# Patient Record
Sex: FEMALE | Race: WHITE | Employment: OTHER | URBAN - METROPOLITAN AREA
[De-identification: names, ages, dates, MRNs, and addresses within clinical notes are randomized per-mention and may not be internally consistent; named-entity substitution may affect disease eponyms.]

---

## 2017-02-14 ENCOUNTER — MYC MEDICAL ADVICE (OUTPATIENT)
Dept: INTERNAL MEDICINE | Facility: CLINIC | Age: 67
End: 2017-02-14

## 2017-02-17 ENCOUNTER — RADIANT APPOINTMENT (OUTPATIENT)
Dept: GENERAL RADIOLOGY | Facility: CLINIC | Age: 67
End: 2017-02-17
Attending: PODIATRIST
Payer: COMMERCIAL

## 2017-02-17 ENCOUNTER — OFFICE VISIT (OUTPATIENT)
Dept: PODIATRY | Facility: CLINIC | Age: 67
End: 2017-02-17
Payer: COMMERCIAL

## 2017-02-17 VITALS
SYSTOLIC BLOOD PRESSURE: 136 MMHG | HEIGHT: 68 IN | DIASTOLIC BLOOD PRESSURE: 80 MMHG | WEIGHT: 181 LBS | BODY MASS INDEX: 27.43 KG/M2

## 2017-02-17 DIAGNOSIS — M79.672 LEFT FOOT PAIN: Primary | ICD-10-CM

## 2017-02-17 DIAGNOSIS — M84.375A STRESS FRACTURE OF LEFT FOOT, INITIAL ENCOUNTER: ICD-10-CM

## 2017-02-17 DIAGNOSIS — M20.5X2 HALLUX LIMITUS, LEFT: ICD-10-CM

## 2017-02-17 PROCEDURE — 99203 OFFICE O/P NEW LOW 30 MIN: CPT | Performed by: PODIATRIST

## 2017-02-17 PROCEDURE — 73630 X-RAY EXAM OF FOOT: CPT | Mod: LT

## 2017-02-17 NOTE — PROGRESS NOTES
ASSESSMENT/PLAN:    Encounter Diagnoses   Name Primary?     Left foot pain Yes     Stress fracture of left foot, initial encounter      Hallux limitus, left      We discussed how her hallux limitus and foot structure likely cause lateral weight transfer and stress on the 4th met.   We reviewed x-rays together.     Relative rest  Ice  CAM walker x 2-4 weeks  Over the counter inserts  Follow up in 1 month via MyChart (if better) or clinic    Body mass index is 27.93 kg/(m^2).    Weight management plan: Patient was referred to their PCP to discuss a diet and exercise plan.      Bridger Huston, LIA, FACFAS, MS    [place] [facility] & Ankle [facility]      ____________________________________________________________________    HPI:          Chief Complaint: left foot pain  Onset of problem: 3-4 weeks  Pain/ discomfort is described as:  Deep ache  Ratin/10   Frequency:  daily    The pain is made worse with walking, movement.  She is limping  Previous treatment: rest, elevation, ibuprofen.      She denies any injury. No change in activity.  The only activity she associates to the pain is being up on a ladder for 10 min around the time the pain was first noted.  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     Diplopia      Fibromyalgia 10/12/2014     Hyperlipidemia LDL goal <130 10/31/2010     Major depression      Menopause      rare hot flash     Myalgias      Unspecified essential hypertension    *  *  Past Surgical History   Procedure Laterality Date     C nonspecific procedure        x2     C nonspecific procedure       Left femur bone growth     C nonspecific procedure       S/P T&A     C nonspecific procedure  1980s     Breast Biopsy left   *  *  Current Outpatient Prescriptions   Medication Sig Dispense Refill     amLODIPine (NORVASC) 5 MG tablet Take 1  "tablet (5 mg) by mouth daily 90 tablet 3     lisinopril-hydrochlorothiazide (PRINZIDE,ZESTORETIC) 10-12.5 MG per tablet Take 1 tablet by mouth every morning 90 tablet 3     DULoxetine (CYMBALTA) 30 MG capsule Three capsules daily in  capsule 3       ROS:     A 10-point review of systems was performed and is positive for that noted in the HPI and as seen below.  All other areas are negative.     Numbness in feet?  no   Calf pain with walking? no  Recent foot/ankle injury? no  Weight change over past 12 months? 20# loss  Self perception as overweight? yes  Recent flu-like symptoms? no  Joint pain other than feet ? Occasional knee, hip, shoulder, hands    Social History: Employment:  Manage and clean building;  Exercise/Physical activity:  Not currently;  Tobacco use:  no  Social History     Social History     Marital status:      Spouse name: N/A     Number of children: N/A     Years of education: N/A     Occupational History     Not on file.     Social History Main Topics     Smoking status: Never Smoker     Smokeless tobacco: Not on file     Alcohol use Yes      Comment: once in a while, small amount     Drug use: No     Sexual activity: No      Comment: tubal ligation     Other Topics Concern     Not on file     Social History Narrative       Family history:  Family History   Problem Relation Age of Onset     Hypertension Father      C.A.D. Father      CANCER Father      bladder  at age 88     Respiratory Mother      COPD     CEREBROVASCULAR DISEASE Brother        Rheumatoid arthritis:  no  Foot Problems: parent, grandparent - bunions  Diabetes: no      EXAM:    Vitals: /80  Ht 5' 7.5\" (1.715 m)  Wt 181 lb (82.1 kg)  LMP 2009  BMI 27.93 kg/m2  BMI: Body mass index is 27.93 kg/(m^2).  Height: 5' 7.5\"    Constitutional/ general:  Pt is in no apparent distress, appears well-nourished.  Cooperative with history and physical exam.     Vascular:  Pedal pulses are palpable bilaterally for both " the DP and PT arteries.  CFT < 3 sec.  No edema.  Pedal hair growth noted.     Neuro:  Alert and oriented x 3. Coordinated gait.  Light touch sensation is intact to the L4, L5, S1 distributions. No obvious deficits.  No evidence of neurological-based weakness, spasticity, or contracture in the lower extremities.     Derm: Normal texture and turgor.  No erythema, ecchymosis, or cyanosis.  No open lesions.     Musculoskeletal:    Lower extremity muscle strength is normal.  Patient is ambulatory without an assistive device or brace.  No gross deformities.  Pain on palpation: over the shaft of the left 4th metatarsal and 3rd.  4th is more painful.     Limited left hallux dorsiflexion with loading of the forefoot.  Dorsal eminence, first met head.      Radiographic Exam:  X-Ray Findings:  I personally reviewed the films.  I do not appreciate any changes in the 3rd or 4th met to suggest a case stress fracture.  Degenerative changes of the 1st metatarsophalangeal joint:  Narrowing, sclerosis, spurring.  1st met elevatus.       Bridger Huston DPM, FACFAS, MS    [place] [facility] of Podiatry/Foot & Ankle Surgery

## 2017-02-17 NOTE — MR AVS SNAPSHOT
"              After Visit Summary   2/17/2017    Yandy Zamudio    MRN: [id number]           Patient Information     Date Of Birth          1950        Visit Information        Provider Department      2/17/2017 7:00 AM Bridger Huston DPM [facility]        Today's Diagnoses     Left foot pain    -  1      Care Instructions    DEGENERATIVE ARTHRITIS OF THE BIG TOE JOINT   (hallux limitus/hallux rigidus)   Arthritis of the joint at the base of the big toe (metatarsophalangeal joint) has several causes. Usually it results from repetitive trauma to the joint, secondary to abnormal foot mechanics. Often it is hereditary. However, a one-time traumatic event can lead to arthritis. The condition doesn't improve with time, and even with treatment, can worsen. The cartilage wears out, joint surfaces are no longer smooth, bone rubs on bone, inflammation occurs with pain, and eventually bone spurs and loose fragments might develop.   The joint is often painful with activity, worse with flimsy shoes or walking barefoot, and it slowly progresses over time. A person might notice the toe \"locking up\" with walking. There often is an obvious, and irritating, bony bump on top of the foot. Shoes might be uncomfortable. In some people the pain is so bothersome that recreational activities sometimes even normal daily activities are difficult to perform.   The pain from this arthritis is likely a combination of joint jamming, cartilage loss and inflammation, and irritation from shoes rubbing on the bump. Sometimes other parts of the foot, leg, or back hurt from altering one's walk to compensate for the painful jOint.     Ways to help a person live with the discomfort include wearing a good, supportive shoe with a rigid, rocker-type bottom. An example is a hiking boot. A rigid sole minimizes bending of the joint, and therefore, joint motion and pain. Shoes with a high toe box allow for less rubbing on " the bump. Avoiding barefoot walking, sandals, flip-flops and slippers usually helps.   Sometimes an insert or orthotic provides symptom relief. This might make shoe fit more difficult. Pads over the bump and occasionally injections into the joint provide relief.   Surgery for this condition is aimed towards alleviating pain. It does not cure the arthritis nor does it guarantee better joint motion. Depending on the condition of the metatarsophalangeal joint, there are several surgiqal options:    1.  Cutting off the bony bump(s) and cleaning the joint    2.  Loosening the joint up by making cuts in the first metatarsal bone or the big toe bone and removing a small section of bone.    3.  Repositioning bone to minimize jamming of the joint.    4.  In severe cases, the joint is fused. By fusing the joint, it will never bend again. This resolves the pain, because it's the movement of a worn out jOint that causes pain. Oftentimes the operation involves a combination of these procedures and. requires the use of screws, pins, and/or a small surgical plate.     Healing after surgery requires about six weeks of protection. This allows the bone to heal. Maximum recovery takes about one year. The scar tissue and joint structures require this amount of time to finish the healing process. Expect stiffness, swelling and numbness during that time frame.   Surgery for arthritis of the metatarsophalangeal joint does involve side effects. Some side effects are predictable and others are less common but do occur. A scar will be visible and could be irritated by shoes. The shoe may rub on the screw or internal pin requiring surgical removal of these fixation devices. The screw and pin would likely be left in place for a full year. The first toe may remain stiff after surgery. The amount of stiffness is variable. Most people never regain normal motion of the first toe. This is due to scar tissue inherent to any surgery, in addition to  the cumUlative effects of arthritis. Sometimes the big toe drifts to one side or the other. Joint fusion is one option to correct an unstable, drifting toe. This procedure straightens the toe, however, no motion remains.   All surgical procedures involve risk of infection, numbness, pain, delayed bone healing, osteotomy (bone cut) dislocation, blood clots, continued foot pain, etc. Arthritic joint surgery is quite complex and should not be taken lightly.    Any skin incision can lead to infection. Deep infection might involve the bone and thus repeat surgery and six weeks of IV antibiotics. Scar tissue can cause nerve pain or numbness. his is generally temporary but can be permanent. We do not have treatments that cure nerve problems. Second toe pain could be related to altered mechanics and pressure transferred to the second toe. Delayed bone healing would lengthen the healing time. Some bones simply do not heal. This requires repeat surgery, electronic bone stimulation and/or extended protection. Smokers have an approximate 20% chance of poor bone healing. This is double that of a non-smoker. The bone cut may displace. This may need to be repaired with a second operation. Displacement can cause joint malalignment. Immobility after surgery can cause a blood clot in the legs and lungs. This could result in death.   Foot pain is complex. Most feet hurt for more than one reason. Operating on the arthritic   big toe joint will not necessarily create a pain free foot. Appropriate shoes, healthy body weight, avoidance of bare foot walking and moderation of activity will always be   necessary to enjoy foot comfort. Arthritis is incurable even with surgery.     Surgery for this type of arthritis is nevertheless quite successful. Most surgical patients are pleased with their foot following surgery. Many of the issues described above can be controlled by taking proper care of your foot during the healing process.   Cosmetic  "bump surgery is discouraged for the reasons listed above. A bump and joint that is comfortable when wearing appropriate shoes should simply be treated with appropriate shoes.   Your surgeon would be happy to fully describe any of the above issues. You should pursue a full understanding of the operation, recovery process and any potential problems that could develop.           Follow-ups after your visit        Who to contact     If you have questions or need follow up information about today's clinic visit or your schedule please contact [facility] directly at [phone].  Normal or non-critical lab and imaging results will be communicated to you by [employer], letter or phone within 4 business days after the clinic has received the results. If you do not hear from us within 7 days, please contact the clinic through [employer] or phone. If you have a critical or abnormal lab result, we will notify you by phone as soon as possible.  Submit refill requests through [employer] or call your pharmacy and they will forward the refill request to us. Please allow 3 business days for your refill to be completed.          Additional Information About Your Visit        [employer] Information     [employer] gives you secure access to your electronic health record. If you see a primary care provider, you can also send messages to your care team and make appointments. If you have questions, please call your primary care clinic.  If you do not have a primary care provider, please call [phone] and they will assist you.        Care EveryWhere ID     This is your Care EveryWhere ID. This could be used by other organizations to access your [place] medical records  [id number]        Your Vitals Were     Height Last Period BMI (Body Mass Index)             5' 7.5\" (1.715 m) 07/27/2009 27.93 kg/m2          Blood Pressure from Last 3 Encounters:   02/17/17 136/80   11/11/16 138/72   11/17/15 120/70    Weight from Last 3 " Encounters:   02/17/17 181 lb (82.1 kg)   11/11/16 181 lb (82.1 kg)   11/17/15 195 lb (88.5 kg)              We Performed the Following     XR Foot Left G/E 3 Views        Primary Care Provider Office Phone # Fax #    Omid Rivera [phone]       [facility] [address] [facility] [address]        Thank you!     Thank you for choosing [facility]  for your care. Our goal is always to provide you with excellent care. Hearing back from our patients is one way we can continue to improve our services. Please take a few minutes to complete the written survey that you may receive in the mail after your visit with us. Thank you!             Your Updated Medication List - Protect others around you: Learn how to safely use, store and throw away your medicines at www.disposemymeds.org.          This list is accurate as of: 2/17/17  7:23 AM.  Always use your most recent med list.                   Brand Name Dispense Instructions for use    amLODIPine 5 MG tablet    NORVASC    90 tablet    Take 1 tablet (5 mg) by mouth daily       DULoxetine 30 MG EC capsule    CYMBALTA    270 capsule    Three capsules daily in AM       lisinopril-hydrochlorothiazide 10-12.5 MG per tablet    PRINZIDE/ZESTORETIC    90 tablet    Take 1 tablet by mouth every morning

## 2017-02-17 NOTE — NURSING NOTE
"Chief Complaint   Patient presents with     Foot Problems     x3-4 week left foot pain on the top of the foot        Initial /80  Ht 5' 7.5\" (1.715 m)  Wt 181 lb (82.1 kg)  LMP 07/27/2009  BMI 27.93 kg/m2 Estimated body mass index is 27.93 kg/(m^2) as calculated from the following:    Height as of this encounter: 5' 7.5\" (1.715 m).    Weight as of this encounter: 181 lb (82.1 kg).  Medication Reconciliation: complete   Dana Sunshine MA      "

## 2017-02-17 NOTE — PATIENT INSTRUCTIONS
"DEGENERATIVE ARTHRITIS OF THE BIG TOE JOINT   (hallux limitus/hallux rigidus)   Arthritis of the joint at the base of the big toe (metatarsophalangeal joint) has several causes. Usually it results from repetitive trauma to the joint, secondary to abnormal foot mechanics. Often it is hereditary. However, a one-time traumatic event can lead to arthritis. The condition doesn't improve with time, and even with treatment, can worsen. The cartilage wears out, joint surfaces are no longer smooth, bone rubs on bone, inflammation occurs with pain, and eventually bone spurs and loose fragments might develop.   The joint is often painful with activity, worse with flimsy shoes or walking barefoot, and it slowly progresses over time. A person might notice the toe \"locking up\" with walking. There often is an obvious, and irritating, bony bump on top of the foot. Shoes might be uncomfortable. In some people the pain is so bothersome that recreational activities sometimes even normal daily activities are difficult to perform.   The pain from this arthritis is likely a combination of joint jamming, cartilage loss and inflammation, and irritation from shoes rubbing on the bump. Sometimes other parts of the foot, leg, or back hurt from altering one's walk to compensate for the painful jOint.     Ways to help a person live with the discomfort include wearing a good, supportive shoe with a rigid, rocker-type bottom. An example is a hiking boot. A rigid sole minimizes bending of the joint, and therefore, joint motion and pain. Shoes with a high toe box allow for less rubbing on the bump. Avoiding barefoot walking, sandals, flip-flops and slippers usually helps.   Sometimes an insert or orthotic provides symptom relief. This might make shoe fit more difficult. Pads over the bump and occasionally injections into the joint provide relief.   Surgery for this condition is aimed towards alleviating pain. It does not cure the arthritis nor does " it guarantee better joint motion. Depending on the condition of the metatarsophalangeal joint, there are several surgiqal options:    1.  Cutting off the bony bump(s) and cleaning the joint    2.  Loosening the joint up by making cuts in the first metatarsal bone or the big toe bone and removing a small section of bone.    3.  Repositioning bone to minimize jamming of the joint.    4.  In severe cases, the joint is fused. By fusing the joint, it will never bend again. This resolves the pain, because it's the movement of a worn out jOint that causes pain. Oftentimes the operation involves a combination of these procedures and. requires the use of screws, pins, and/or a small surgical plate.     Healing after surgery requires about six weeks of protection. This allows the bone to heal. Maximum recovery takes about one year. The scar tissue and joint structures require this amount of time to finish the healing process. Expect stiffness, swelling and numbness during that time frame.   Surgery for arthritis of the metatarsophalangeal joint does involve side effects. Some side effects are predictable and others are less common but do occur. A scar will be visible and could be irritated by shoes. The shoe may rub on the screw or internal pin requiring surgical removal of these fixation devices. The screw and pin would likely be left in place for a full year. The first toe may remain stiff after surgery. The amount of stiffness is variable. Most people never regain normal motion of the first toe. This is due to scar tissue inherent to any surgery, in addition to the cumUlative effects of arthritis. Sometimes the big toe drifts to one side or the other. Joint fusion is one option to correct an unstable, drifting toe. This procedure straightens the toe, however, no motion remains.   All surgical procedures involve risk of infection, numbness, pain, delayed bone healing, osteotomy (bone cut) dislocation, blood clots, continued  foot pain, etc. Arthritic joint surgery is quite complex and should not be taken lightly.    Any skin incision can lead to infection. Deep infection might involve the bone and thus repeat surgery and six weeks of IV antibiotics. Scar tissue can cause nerve pain or numbness. his is generally temporary but can be permanent. We do not have treatments that cure nerve problems. Second toe pain could be related to altered mechanics and pressure transferred to the second toe. Delayed bone healing would lengthen the healing time. Some bones simply do not heal. This requires repeat surgery, electronic bone stimulation and/or extended protection. Smokers have an approximate 20% chance of poor bone healing. This is double that of a non-smoker. The bone cut may displace. This may need to be repaired with a second operation. Displacement can cause joint malalignment. Immobility after surgery can cause a blood clot in the legs and lungs. This could result in death.   Foot pain is complex. Most feet hurt for more than one reason. Operating on the arthritic   big toe joint will not necessarily create a pain free foot. Appropriate shoes, healthy body weight, avoidance of bare foot walking and moderation of activity will always be   necessary to enjoy foot comfort. Arthritis is incurable even with surgery.     Surgery for this type of arthritis is nevertheless quite successful. Most surgical patients are pleased with their foot following surgery. Many of the issues described above can be controlled by taking proper care of your foot during the healing process.   Cosmetic bump surgery is discouraged for the reasons listed above. A bump and joint that is comfortable when wearing appropriate shoes should simply be treated with appropriate shoes.   Your surgeon would be happy to fully describe any of the above issues. You should pursue a full understanding of the operation, recovery process and any potential problems that could develop.

## 2017-03-17 ENCOUNTER — RADIANT APPOINTMENT (OUTPATIENT)
Dept: MAMMOGRAPHY | Facility: CLINIC | Age: 67
End: 2017-03-17
Attending: INTERNAL MEDICINE
Payer: COMMERCIAL

## 2017-03-17 DIAGNOSIS — Z12.31 VISIT FOR SCREENING MAMMOGRAM: ICD-10-CM

## 2017-03-17 PROCEDURE — G0202 SCR MAMMO BI INCL CAD: HCPCS | Mod: TC

## 2017-03-20 ENCOUNTER — MYC MEDICAL ADVICE (OUTPATIENT)
Dept: PODIATRY | Facility: CLINIC | Age: 67
End: 2017-03-20

## 2017-04-06 ENCOUNTER — MYC MEDICAL ADVICE (OUTPATIENT)
Dept: PODIATRY | Facility: CLINIC | Age: 67
End: 2017-04-06

## 2017-04-17 ENCOUNTER — RADIANT APPOINTMENT (OUTPATIENT)
Dept: GENERAL RADIOLOGY | Facility: CLINIC | Age: 67
End: 2017-04-17
Attending: PODIATRIST
Payer: COMMERCIAL

## 2017-04-17 ENCOUNTER — OFFICE VISIT (OUTPATIENT)
Dept: PODIATRY | Facility: CLINIC | Age: 67
End: 2017-04-17
Payer: COMMERCIAL

## 2017-04-17 VITALS
DIASTOLIC BLOOD PRESSURE: 106 MMHG | WEIGHT: 181 LBS | HEIGHT: 68 IN | BODY MASS INDEX: 27.43 KG/M2 | SYSTOLIC BLOOD PRESSURE: 179 MMHG

## 2017-04-17 DIAGNOSIS — M79.672 LEFT FOOT PAIN: Primary | ICD-10-CM

## 2017-04-17 PROCEDURE — 99213 OFFICE O/P EST LOW 20 MIN: CPT | Performed by: PODIATRIST

## 2017-04-17 PROCEDURE — 73630 X-RAY EXAM OF FOOT: CPT | Mod: LT

## 2017-04-17 NOTE — MR AVS SNAPSHOT
"              After Visit Summary   4/17/2017    Yandy Zamudio    MRN: [id number]           Patient Information     Date Of Birth          1950        Visit Information        Provider Department      4/17/2017 11:15 AM Bridger Huston DPM [facility]        Today's Diagnoses     Left foot pain    -  1       Follow-ups after your visit        Future tests that were ordered for you today     Open Future Orders        Priority Expected Expires Ordered    MR Foot Left w/o & w Contrast Routine  4/17/2018 4/17/2017            Who to contact     If you have questions or need follow up information about today's clinic visit or your schedule please contact [facility] directly at [phone].  Normal or non-critical lab and imaging results will be communicated to you by [employer], letter or phone within 4 business days after the clinic has received the results. If you do not hear from us within 7 days, please contact the clinic through [employer]cordhart or phone. If you have a critical or abnormal lab result, we will notify you by phone as soon as possible.  Submit refill requests through [employer] or call your pharmacy and they will forward the refill request to us. Please allow 3 business days for your refill to be completed.          Additional Information About Your Visit        MyChart Information     [employer] gives you secure access to your electronic health record. If you see a primary care provider, you can also send messages to your care team and make appointments. If you have questions, please call your primary care clinic.  If you do not have a primary care provider, please call [phone] and they will assist you.        Care EveryWhere ID     This is your Care EveryWhere ID. This could be used by other organizations to access your [place] medical records  [id number]        Your Vitals Were     Height Last Period BMI (Body Mass Index)             5' 7.5\" (1.715 " m) 07/27/2009 27.93 kg/m2          Blood Pressure from Last 3 Encounters:   04/17/17 (!) 179/106   02/17/17 136/80   11/11/16 138/72    Weight from Last 3 Encounters:   04/17/17 181 lb (82.1 kg)   02/17/17 181 lb (82.1 kg)   11/11/16 181 lb (82.1 kg)              We Performed the Following     XR Foot Left G/E 3 Views        Primary Care Provider Office Phone # Fax #    Omid Rivera [phone]       [facility] [address] [facility] [address]        Thank you!     Thank you for choosing [facility]  for your care. Our goal is always to provide you with excellent care. Hearing back from our patients is one way we can continue to improve our services. Please take a few minutes to complete the written survey that you may receive in the mail after your visit with us. Thank you!             Your Updated Medication List - Protect others around you: Learn how to safely use, store and throw away your medicines at www.disposemymeds.org.          This list is accurate as of: 4/17/17 12:06 PM.  Always use your most recent med list.                   Brand Name Dispense Instructions for use    amLODIPine 5 MG tablet    NORVASC    90 tablet    Take 1 tablet (5 mg) by mouth daily       DULoxetine 30 MG EC capsule    CYMBALTA    270 capsule    Three capsules daily in AM       lisinopril-hydrochlorothiazide 10-12.5 MG per tablet    PRINZIDE/ZESTORETIC    90 tablet    Take 1 tablet by mouth every morning       order for DME     1 Device    Equipment being ordered: CAM walker

## 2017-04-17 NOTE — PROGRESS NOTES
Subjective:   Yandy Zamudio is a 67 year old female who presents today in follow up for left foot pain thought to be a stress reaction of the 4th metatarsal.  She wore the CAM walker with an insert x 4 weeks.  Pain is persisting.  She has pain along the 4th metatarsal and more proximal now.  She asks what the next step is.        Patient Active Problem List    Diagnosis Date Noted     Greater trochanteric bursitis of right hip 05/04/2015     Priority: Medium     Left shoulder pain 05/04/2015     Priority: Medium     Fibromyalgia 10/12/2014     Priority: Medium     Blood glucose elevated 04/27/2014     Priority: Medium     Advanced directives, counseling/discussion 01/12/2012     Priority: Medium     Advance Directive Problem List Overview:   Name Relationship Phone    Primary Health Care Agent            Alternative Health Care Agent          Discussed advance care planning with patient; information given to patient to review. 1/12/2012          HYPERLIPIDEMIA LDL GOAL <130 10/31/2010     Priority: Medium     Major depression 08/26/2010     Priority: Medium     Obesity 09/02/2009     Priority: Medium     Colon polyps      Priority: Medium     Tubulovillous adenoma       iamSPONDYLOLISTHESIS 10/15/2007     Priority: Medium     Essential hypertension 09/20/2006     Priority: Medium     Problem list name updated by automated process. Provider to review       Anxiety state 12/23/2002     Priority: Medium     Problem list name updated by automated process. Provider to review           Objective:    B/P: 179/106, T: Data Unavailable, P: Data Unavailable, R: Data Unavailable  Body mass index is 27.93 kg/(m^2).    Constitutional:  Pt is in no apparent distress,  asks appropriate questions, appears well nourished and healthy.    Vascular:  Pedal pulses are palpable bilaterally for both the DP and PT arteries. CFT < 3 sec. No edema. Pedal hair growth noted.      Neuro: Alert and oriented x 3. Coordinated gait. Light touch  sensation is intact to the L4, L5, S1 distributions. No obvious deficits. No evidence of neurological-based weakness, spasticity, or contracture in the lower extremities.      Derm: Normal texture and turgor. No erythema, ecchymosis, or cyanosis. No open lesions.      Musculoskeletal:  Lower extremity muscle strength is normal. Patient is ambulatory without an assistive device or brace. No gross deformities. Pain on palpation: over the shaft of the left 4th metatarsal.  Pain at the level of the 4th met-cuboid joint.      Limited left hallux dorsiflexion with loading of the forefoot. Dorsal eminence, first met head.      Radiographic Exam: X-Ray Findings: I personally reviewed the films. I do not appreciate any changes in the 3rd or 4th met to suggest a case stress fracture. Degenerative changes of the 1st metatarsophalangeal joint: Narrowing, sclerosis, spurring. 1st met elevatus.       Assessment/Plan:     Encounter Diagnosis   Name Primary?     Left foot pain Yes     Stress reaction of bone suspected, yet not responding to immobilization.      MRI left foot    CAM Walker until MRI    If MRI negative, then orthoses and quality shoes    If joint pain considered, image-guided injection is an option      Body mass index is 27.93 kg/(m^2).    Weight management plan: Patient was referred to their PCP to discuss a diet and exercise plan.      Bridger Huston DPM

## 2017-04-17 NOTE — LETTER
4/17/2017       RE: Yandy Zamudio  [address] [facility] [address]           Dear Colleague,    Thank you for referring your patient, Yandy Zamudio, to the [facility]. Please see a copy of my visit note below.    Subjective:   Yandy Zamudio is a 67 year old female who presents today in follow up for left foot pain thought to be a stress reaction of the 4th metatarsal.  She wore the CAM walker with an insert x 4 weeks.  Pain is persisting.  She has pain along the 4th metatarsal and more proximal now.  She asks what the next step is.        Patient Active Problem List    Diagnosis Date Noted     Greater trochanteric bursitis of right hip 05/04/2015     Priority: Medium     Left shoulder pain 05/04/2015     Priority: Medium     Fibromyalgia 10/12/2014     Priority: Medium     Blood glucose elevated 04/27/2014     Priority: Medium     Advanced directives, counseling/discussion 01/12/2012     Priority: Medium     Advance Directive Problem List Overview:   Name Relationship Phone    Primary Health Care Agent            Alternative Health Care Agent          Discussed advance care planning with patient; information given to patient to review. 1/12/2012          HYPERLIPIDEMIA LDL GOAL <130 10/31/2010     Priority: Medium     Major depression 08/26/2010     Priority: Medium     Obesity 09/02/2009     Priority: Medium     Colon polyps      Priority: Medium     Tubulovillous adenoma       iamSPONDYLOLISTHESIS 10/15/2007     Priority: Medium     Essential hypertension 09/20/2006     Priority: Medium     Problem list name updated by automated process. Provider to review       Anxiety state 12/23/2002     Priority: Medium     Problem list name updated by automated process. Provider to review           Objective:    B/P: 179/106, T: Data Unavailable, P: Data Unavailable, R: Data Unavailable  Body mass index is 27.93 kg/(m^2).    Constitutional:  Pt is in no apparent distress,  asks  appropriate questions, appears well nourished and healthy.    Vascular:  Pedal pulses are palpable bilaterally for both the DP and PT arteries. CFT < 3 sec. No edema. Pedal hair growth noted.      Neuro: Alert and oriented x 3. Coordinated gait. Light touch sensation is intact to the L4, L5, S1 distributions. No obvious deficits. No evidence of neurological-based weakness, spasticity, or contracture in the lower extremities.      Derm: Normal texture and turgor. No erythema, ecchymosis, or cyanosis. No open lesions.      Musculoskeletal:  Lower extremity muscle strength is normal. Patient is ambulatory without an assistive device or brace. No gross deformities. Pain on palpation: over the shaft of the left 4th metatarsal.  Pain at the level of the 4th met-cuboid joint.      Limited left hallux dorsiflexion with loading of the forefoot. Dorsal eminence, first met head.      Radiographic Exam: X-Ray Findings: I personally reviewed the films. I do not appreciate any changes in the 3rd or 4th met to suggest a case stress fracture. Degenerative changes of the 1st metatarsophalangeal joint: Narrowing, sclerosis, spurring. 1st met elevatus.       Assessment/Plan:     Encounter Diagnosis   Name Primary?     Left foot pain Yes     Stress reaction of bone suspected, yet not responding to immobilization.      MRI left foot    CAM Walker until MRI    If MRI negative, then orthoses and quality shoes    If joint pain considered, image-guided injection is an option      Body mass index is 27.93 kg/(m^2).    Weight management plan: Patient was referred to their PCP to discuss a diet and exercise plan.      Bridger Huston DPM            Again, thank you for allowing me to participate in the care of your patient.        Sincerely,              Bridger Huston DPM

## 2017-04-17 NOTE — NURSING NOTE
"Chief Complaint   Patient presents with     Foot Problems     previously seen in February, left foot pain has not improved with 8 weeks in CAM       Initial BP (!) 179/106 (BP Location: Right arm, Patient Position: Chair, Cuff Size: Adult Regular)  Ht 5' 7.5\" (1.715 m)  Wt 181 lb (82.1 kg)  LMP 07/27/2009  BMI 27.93 kg/m2 Estimated body mass index is 27.93 kg/(m^2) as calculated from the following:    Height as of this encounter: 5' 7.5\" (1.715 m).    Weight as of this encounter: 181 lb (82.1 kg).  Medication Reconciliation: complete   Dana Sunshine MA      "

## 2017-04-24 ENCOUNTER — HOSPITAL ENCOUNTER (OUTPATIENT)
Dept: MRI IMAGING | Facility: CLINIC | Age: 67
Discharge: HOME | End: 2017-04-24
Attending: PODIATRIST | Admitting: PODIATRIST
Payer: MEDICARE

## 2017-04-24 DIAGNOSIS — M79.672 LEFT FOOT PAIN: ICD-10-CM

## 2017-04-24 PROCEDURE — 73718 MRI LOWER EXTREMITY W/O DYE: CPT | Mod: LT

## 2017-04-25 DIAGNOSIS — M19.079 ARTHRITIS, MIDFOOT: Primary | ICD-10-CM

## 2017-04-25 DIAGNOSIS — M79.672 LEFT FOOT PAIN: ICD-10-CM

## 2017-05-09 DIAGNOSIS — Z11.59 NEED FOR HEPATITIS C SCREENING TEST: ICD-10-CM

## 2017-05-09 DIAGNOSIS — I10 ESSENTIAL HYPERTENSION WITH GOAL BLOOD PRESSURE LESS THAN 140/90: ICD-10-CM

## 2017-05-09 DIAGNOSIS — E78.5 HYPERLIPIDEMIA LDL GOAL <130: ICD-10-CM

## 2017-05-09 LAB
ANION GAP SERPL CALCULATED.3IONS-SCNC: 7 MMOL/L (ref 3–14)
BUN SERPL-MCNC: 17 MG/DL (ref 7–30)
CALCIUM SERPL-MCNC: 9.1 MG/DL (ref 8.5–10.1)
CHLORIDE SERPL-SCNC: 105 MMOL/L (ref 94–109)
CHOLEST SERPL-MCNC: 244 MG/DL
CO2 SERPL-SCNC: 30 MMOL/L (ref 20–32)
CREAT SERPL-MCNC: 0.72 MG/DL (ref 0.52–1.04)
GFR SERPL CREATININE-BSD FRML MDRD: 80 ML/MIN/1.7M2
GLUCOSE SERPL-MCNC: 110 MG/DL (ref 70–99)
HDLC SERPL-MCNC: 43 MG/DL
LDLC SERPL CALC-MCNC: 130 MG/DL
NONHDLC SERPL-MCNC: 201 MG/DL
POTASSIUM SERPL-SCNC: 4.6 MMOL/L (ref 3.4–5.3)
SODIUM SERPL-SCNC: 142 MMOL/L (ref 133–144)
TRIGL SERPL-MCNC: 353 MG/DL

## 2017-05-09 PROCEDURE — 36415 COLL VENOUS BLD VENIPUNCTURE: CPT | Performed by: INTERNAL MEDICINE

## 2017-05-09 PROCEDURE — 86803 HEPATITIS C AB TEST: CPT | Performed by: INTERNAL MEDICINE

## 2017-05-09 PROCEDURE — 80048 BASIC METABOLIC PNL TOTAL CA: CPT | Performed by: INTERNAL MEDICINE

## 2017-05-09 PROCEDURE — 80061 LIPID PANEL: CPT | Performed by: INTERNAL MEDICINE

## 2017-05-10 LAB — HCV AB SERPL QL IA: NORMAL

## 2017-05-22 ENCOUNTER — OFFICE VISIT (OUTPATIENT)
Dept: INTERNAL MEDICINE | Facility: CLINIC | Age: 67
End: 2017-05-22
Payer: COMMERCIAL

## 2017-05-22 VITALS
DIASTOLIC BLOOD PRESSURE: 70 MMHG | SYSTOLIC BLOOD PRESSURE: 132 MMHG | BODY MASS INDEX: 28.66 KG/M2 | OXYGEN SATURATION: 97 % | TEMPERATURE: 98.3 F | WEIGHT: 189.1 LBS | HEART RATE: 96 BPM | HEIGHT: 68 IN

## 2017-05-22 DIAGNOSIS — R73.9 BLOOD GLUCOSE ELEVATED: ICD-10-CM

## 2017-05-22 DIAGNOSIS — M19.91 PRIMARY OSTEOARTHRITIS, UNSPECIFIED SITE: Primary | ICD-10-CM

## 2017-05-22 DIAGNOSIS — E78.5 HYPERLIPIDEMIA LDL GOAL <130: ICD-10-CM

## 2017-05-22 DIAGNOSIS — R63.5 ABNORMAL WEIGHT GAIN: ICD-10-CM

## 2017-05-22 DIAGNOSIS — F32.0 MAJOR DEPRESSIVE DISORDER, SINGLE EPISODE, MILD (H): ICD-10-CM

## 2017-05-22 LAB — TSH SERPL DL<=0.005 MIU/L-ACNC: 2.48 MU/L (ref 0.4–4)

## 2017-05-22 PROCEDURE — 99214 OFFICE O/P EST MOD 30 MIN: CPT | Performed by: INTERNAL MEDICINE

## 2017-05-22 PROCEDURE — 36415 COLL VENOUS BLD VENIPUNCTURE: CPT | Performed by: INTERNAL MEDICINE

## 2017-05-22 PROCEDURE — 84443 ASSAY THYROID STIM HORMONE: CPT | Performed by: INTERNAL MEDICINE

## 2017-05-22 RX ORDER — MULTIPLE VITAMINS W/ MINERALS TAB 9MG-400MCG
1 TAB ORAL DAILY
COMMUNITY

## 2017-05-22 RX ORDER — MELOXICAM 15 MG/1
15 TABLET ORAL DAILY
Qty: 30 TABLET | Refills: 11 | Status: SHIPPED | OUTPATIENT
Start: 2017-05-22 | End: 2018-05-09

## 2017-05-22 NOTE — NURSING NOTE
"Chief Complaint   Patient presents with     Fibromyalgia     Arthritis       Initial /70  Pulse 96  Temp 98.3  F (36.8  C) (Oral)  Ht 5' 7.5\" (1.715 m)  Wt 189 lb 1.6 oz (85.8 kg)  LMP 07/27/2009  SpO2 97%  BMI 29.18 kg/m2 Estimated body mass index is 29.18 kg/(m^2) as calculated from the following:    Height as of this encounter: 5' 7.5\" (1.715 m).    Weight as of this encounter: 189 lb 1.6 oz (85.8 kg).  Medication Reconciliation: complete   Helga Santamaria MA   "

## 2017-05-22 NOTE — PROGRESS NOTES
SUBJECTIVE:                                                    Yandy Zamudio is a 67 year old female who presents to clinic today for the following health issues:      Joint Pain-fibromyalgia      Onset:  Years     Description:   Location: upper body   Character: Dull ache    Intensity: mild    Progression of Symptoms: same    Accompanying Signs & Symptoms:  Other symptoms: sweats and chills   History:   Previous similar pain: YES      Precipitating factors:   Trauma or overuse: no     Alleviating factors:  Improved by: cymbalta        Therapies Tried and outcome: cymbalta         Joint Pain-arthritis      Onset: 5 months    Description:   Location: L foot  Character: Dull ache    Intensity: mild    Progression of Symptoms: worse    Accompanying Signs & Symptoms:  Other symptoms: numbness and tingling in toes   History:   Previous similar pain: no       Precipitating factors:   Trauma or overuse: no     Alleviating factors:  Improved by: rest/inactivity       Therapies Tried and outcome: rest     Pt's past medical history, family history, habits, medications and allergies were reviewed with the patient today.  See snap shot for  HCM status. Most recent lab results reviewed with pt. Problem list and histories reviewed & adjusted, as indicated.  Additional history as below:    Denies CP, SOB, abdominal pain, polyuria, polydipsia, vision changes, extremity numbness/parasthesias or skin problems.  Pains both in muscles with hx fibromyalgia but also in joints. No excess warmth/erythema in joints. Slight stiffess in AM and better with activity. Previous Rheum labs neg  Weight up 8 pounds.. Moderate compliance with diet.  PHQ=4     Additional ROS:   Constitutional, HEENT, Cardiovascular, Pulmonary, GI and , Neuro, MSK and Psych review of systems/symptoms are otherwise negative or unchanged from previous, except as noted above.      No identified additional risks  The 10-year ASCVD risk score (Carey ROSA Jr, et al.,  "2013) is: 10.6%    Values used to calculate the score:      Age: 67 years      Sex: Female      Is Non- : No      Diabetic: No      Tobacco smoker: No      Systolic Blood Pressure: 132 mmHg      Is BP treated: Yes      HDL Cholesterol: 43 mg/dL      Total Cholesterol: 244 mg/dL      Lab Results   Component Value Date     05/09/2017     Lab Results   Component Value Date    A1C 5.6 04/15/2015     Lab Results   Component Value Date    CHOL 244 05/09/2017     Lab Results   Component Value Date     05/09/2017     Lab Results   Component Value Date    HDL 43 05/09/2017     Lab Results   Component Value Date    TRIG 353 05/09/2017     Lab Results   Component Value Date    CR 0.72 05/09/2017     Lab Results   Component Value Date    ALT 28 04/15/2015     Lab Results   Component Value Date    AST 13 04/15/2015     No results found for: MICROL  Lab Results   Component Value Date    TSH 2.48 05/22/2017           OBJECTIVE:  /70  Pulse 96  Temp 98.3  F (36.8  C) (Oral)  Ht 5' 7.5\" (1.715 m)  Wt 189 lb 1.6 oz (85.8 kg)  LMP 07/27/2009  SpO2 97%  BMI 29.18 kg/m2   Estimated body mass index is 29.18 kg/(m^2) as calculated from the following:    Height as of this encounter: 5' 7.5\" (1.715 m).    Weight as of this encounter: 189 lb 1.6 oz (85.8 kg).  Eye: PERRL, EOMI  HENT: ear canals and TM's normal and nose and mouth without ulcers or lesions   Neck: no adenopathy. Thyroid normal to palpation. No bruits  Pulm: Lungs clear to auscultation   CV: Regular rates and rhythm  GI: Soft, nontender, Normal active bowel sounds, No hepatosplenomegaly or masses palpable  Ext: Peripheral pulses intact. No edema. DIP and PIP joints hands with osteoarthritis changes  Neuro: Normal strength and tone, sensory exam grossly normal    Assessment/Plan: (See plan discussion below for further details)  1. Primary osteoarthritis, unspecified site  - meloxicam (MOBIC) 15 MG tablet; Take 1 tablet (15 mg) " by mouth daily  Dispense: 30 tablet; Refill: 11    2. Abnormal weight gain  Lab as ordered  - TSH with free T4 reflex    3. Blood glucose elevated  Reduce carbohydrate (sugars, starchy foods such as bread, rice, potato, pasta, etc) intake  in your diet and increase the amount of color on your plate with fruits, vegetables and lean meats.   - TSH with free T4 reflex    4. Hyperlipidemia LDL goal <130   Pt  Intolerant of Pravastatin. Future trial of other statin once MSK pain issues stabilized   - TSH with free T4 reflex    5. Major depressive disorder, single episode, mild (H)  Continue Cymbalta for now. Possible reduction in future to help with weight gain risk but, given other pain issues, prefer to keep same dose for the moment    Plan discussion:  Meloxicam 15mg daily with food   Update me in 3 weeks  Heat/stretching in AM  Possible future reduction Cymbalta dosing  Thyroid lab  Reduce carbohydrate (sugars, starchy foods such as bread, rice, potato, pasta, etc) intake  in your diet and increase the amount of color on your plate with fruits, vegetables and lean meats.        Omid Rivera MD  Internal Medicine Department  [facility]

## 2017-05-22 NOTE — MR AVS SNAPSHOT
After Visit Summary   5/22/2017    Yandy Zamudio    MRN: [id number]           Patient Information     Date Of Birth          1950        Visit Information        Provider Department      5/22/2017 2:00 PM Omid Rivera MD [facility]        Today's Diagnoses     Primary osteoarthritis, unspecified site    -  1    Abnormal weight gain        Blood glucose elevated        Hyperlipidemia LDL goal <130        Major depressive disorder, single episode, mild (H)           Follow-ups after your visit        Your next 10 appointments already scheduled     Jun 01, 2017 10:30 AM CDT   DX HIP/PELVIS/SPINE with [facility]XDX[facility]   [facility] ([facility]    [address] [address] [phone]           Please do not take any of the following 24 hours prior to the day of your exam: vitamins, calcium tablets, antacids.              Who to contact     If you have questions or need follow up information about today's clinic visit or your schedule please contact [facility] directly at [phone].  Normal or non-critical lab and imaging results will be communicated to you by [employer], letter or phone within 4 business days after the clinic has received the results. If you do not hear from us within 7 days, please contact the clinic through [employer] or phone. If you have a critical or abnormal lab result, we will notify you by phone as soon as possible.  Submit refill requests through [employer] or call your pharmacy and they will forward the refill request to us. Please allow 3 business days for your refill to be completed.          Additional Information About Your Visit        MyChart Information     [employer] gives you secure access to your electronic health record. If you see a primary care provider, you can also send messages to your care team and make appointments. If you have questions, please call  "your primary care clinic.  If you do not have a primary care provider, please call [phone] and they will assist you.        Care EveryWhere ID     This is your Care EveryWhere ID. This could be used by other organizations to access your [place] medical records  [id number]        Your Vitals Were     Pulse Temperature Height Last Period Pulse Oximetry BMI (Body Mass Index)    96 98.3  F (36.8  C) (Oral) 5' 7.5\" (1.715 m) 07/27/2009 97% 29.18 kg/m2       Blood Pressure from Last 3 Encounters:   05/22/17 132/70   04/17/17 (!) 179/106   02/17/17 136/80    Weight from Last 3 Encounters:   05/22/17 189 lb 1.6 oz (85.8 kg)   04/17/17 181 lb (82.1 kg)   02/17/17 181 lb (82.1 kg)              We Performed the Following     TSH with free T4 reflex          Today's Medication Changes          These changes are accurate as of: 5/22/17 11:59 PM.  If you have any questions, ask your nurse or doctor.               Start taking these medicines.        Dose/Directions    meloxicam 15 MG tablet   Commonly known as:  MOBIC   Used for:  Primary osteoarthritis, unspecified site   Started by:  Omid Rivera MD        Dose:  15 mg   Take 1 tablet (15 mg) by mouth daily   Quantity:  30 tablet   Refills:  11            Where to get your medicines      These medications were sent to [facility] [address] [place] [address] [id number] [place] [address]  [address] [place] [address]     Phone:  [phone]     meloxicam 15 MG tablet                Primary Care Provider Office Phone # Fax #    Omid Rivera [phone]       [facility] [address]  [facility] [address]        Thank you!     Thank you for choosing [facility]  for your care. Our goal is always to provide you with excellent care. Hearing back from our patients is one way we can continue to improve our services. Please take a few minutes to complete the written survey that you may receive in the mail after " your visit with us. Thank you!             Your Updated Medication List - Protect others around you: Learn how to safely use, store and throw away your medicines at www.disposemymeds.org.          This list is accurate as of: 5/22/17 11:59 PM.  Always use your most recent med list.                   Brand Name Dispense Instructions for use    amLODIPine 5 MG tablet    NORVASC    90 tablet    Take 1 tablet (5 mg) by mouth daily       DULoxetine 30 MG EC capsule    CYMBALTA    270 capsule    Three capsules daily in AM       lisinopril-hydrochlorothiazide 10-12.5 MG per tablet    PRINZIDE/ZESTORETIC    90 tablet    Take 1 tablet by mouth every morning       meloxicam 15 MG tablet    MOBIC    30 tablet    Take 1 tablet (15 mg) by mouth daily       Multi-vitamin Tabs tablet      Take 1 tablet by mouth daily       order for DME     1 Device    Equipment being ordered: CAM walker

## 2017-05-23 ASSESSMENT — PATIENT HEALTH QUESTIONNAIRE - PHQ9: SUM OF ALL RESPONSES TO PHQ QUESTIONS 1-9: 4

## 2017-06-01 ENCOUNTER — RADIANT APPOINTMENT (OUTPATIENT)
Dept: BONE DENSITY | Facility: CLINIC | Age: 67
End: 2017-06-01
Attending: INTERNAL MEDICINE
Payer: COMMERCIAL

## 2017-06-01 DIAGNOSIS — Z78.0 ASYMPTOMATIC POSTMENOPAUSAL STATUS: ICD-10-CM

## 2017-06-01 PROCEDURE — 77080 DXA BONE DENSITY AXIAL: CPT | Performed by: INTERNAL MEDICINE

## 2017-06-13 ENCOUNTER — MYC MEDICAL ADVICE (OUTPATIENT)
Dept: INTERNAL MEDICINE | Facility: CLINIC | Age: 67
End: 2017-06-13

## 2017-11-06 ENCOUNTER — DOCUMENTATION ONLY (OUTPATIENT)
Dept: LAB | Facility: CLINIC | Age: 67
End: 2017-11-06

## 2017-11-06 DIAGNOSIS — I10 ESSENTIAL HYPERTENSION: ICD-10-CM

## 2017-11-06 DIAGNOSIS — R73.9 BLOOD GLUCOSE ELEVATED: ICD-10-CM

## 2017-11-06 DIAGNOSIS — E78.5 HYPERLIPIDEMIA LDL GOAL <130: Primary | ICD-10-CM

## 2017-11-06 NOTE — PROGRESS NOTES
Ordered. Call and remind pt to have labs done fasting. Also let her know that, if more convenient to have done in [place] at [facility] lab, can do that or here in [facility] if prefers

## 2017-11-06 NOTE — PROGRESS NOTES
The patient has been notified of this information and all questions answered.  Has appointment for fasting labs on Thursday, November 9, 2017.

## 2017-11-09 DIAGNOSIS — R73.9 BLOOD GLUCOSE ELEVATED: ICD-10-CM

## 2017-11-09 DIAGNOSIS — I10 ESSENTIAL HYPERTENSION: ICD-10-CM

## 2017-11-09 DIAGNOSIS — E78.5 HYPERLIPIDEMIA LDL GOAL <130: ICD-10-CM

## 2017-11-09 LAB
ALBUMIN SERPL-MCNC: 3.8 G/DL (ref 3.4–5)
ALP SERPL-CCNC: 77 U/L (ref 40–150)
ALT SERPL W P-5'-P-CCNC: 38 U/L (ref 0–50)
ANION GAP SERPL CALCULATED.3IONS-SCNC: 7 MMOL/L (ref 3–14)
AST SERPL W P-5'-P-CCNC: 25 U/L (ref 0–45)
BILIRUB SERPL-MCNC: 0.5 MG/DL (ref 0.2–1.3)
BUN SERPL-MCNC: 20 MG/DL (ref 7–30)
CALCIUM SERPL-MCNC: 9 MG/DL (ref 8.5–10.1)
CHLORIDE SERPL-SCNC: 105 MMOL/L (ref 94–109)
CHOLEST SERPL-MCNC: 259 MG/DL
CO2 SERPL-SCNC: 29 MMOL/L (ref 20–32)
CREAT SERPL-MCNC: 0.72 MG/DL (ref 0.52–1.04)
GFR SERPL CREATININE-BSD FRML MDRD: 80 ML/MIN/1.7M2
GLUCOSE SERPL-MCNC: 88 MG/DL (ref 70–99)
HBA1C MFR BLD: 5.4 % (ref 4.3–6)
HDLC SERPL-MCNC: 53 MG/DL
LDLC SERPL CALC-MCNC: 172 MG/DL
NONHDLC SERPL-MCNC: 206 MG/DL
POTASSIUM SERPL-SCNC: 3.9 MMOL/L (ref 3.4–5.3)
PROT SERPL-MCNC: 7.1 G/DL (ref 6.8–8.8)
SODIUM SERPL-SCNC: 141 MMOL/L (ref 133–144)
TRIGL SERPL-MCNC: 170 MG/DL

## 2017-11-09 PROCEDURE — 80053 COMPREHEN METABOLIC PANEL: CPT | Performed by: INTERNAL MEDICINE

## 2017-11-09 PROCEDURE — 83036 HEMOGLOBIN GLYCOSYLATED A1C: CPT | Performed by: INTERNAL MEDICINE

## 2017-11-09 PROCEDURE — 36415 COLL VENOUS BLD VENIPUNCTURE: CPT | Performed by: INTERNAL MEDICINE

## 2017-11-09 PROCEDURE — 80061 LIPID PANEL: CPT | Performed by: INTERNAL MEDICINE

## 2017-11-12 DIAGNOSIS — M79.7 FIBROMYALGIA: ICD-10-CM

## 2017-11-12 DIAGNOSIS — F33.2 MAJOR DEPRESSIVE DISORDER, RECURRENT, SEVERE WITHOUT PSYCHOTIC FEATURES (H): ICD-10-CM

## 2017-11-14 RX ORDER — DULOXETIN HYDROCHLORIDE 30 MG/1
CAPSULE, DELAYED RELEASE ORAL
Qty: 270 CAPSULE | Refills: 0 | Status: SHIPPED | OUTPATIENT
Start: 2017-11-14 | End: 2017-11-16

## 2017-11-16 ENCOUNTER — OFFICE VISIT (OUTPATIENT)
Dept: INTERNAL MEDICINE | Facility: CLINIC | Age: 67
End: 2017-11-16
Payer: COMMERCIAL

## 2017-11-16 VITALS
WEIGHT: 188 LBS | HEART RATE: 94 BPM | DIASTOLIC BLOOD PRESSURE: 68 MMHG | BODY MASS INDEX: 29.01 KG/M2 | SYSTOLIC BLOOD PRESSURE: 126 MMHG | TEMPERATURE: 98.6 F | OXYGEN SATURATION: 98 %

## 2017-11-16 DIAGNOSIS — Z23 NEED FOR PROPHYLACTIC VACCINATION AND INOCULATION AGAINST INFLUENZA: ICD-10-CM

## 2017-11-16 DIAGNOSIS — F33.2 MAJOR DEPRESSIVE DISORDER, RECURRENT, SEVERE WITHOUT PSYCHOTIC FEATURES (H): ICD-10-CM

## 2017-11-16 DIAGNOSIS — Z23 NEED FOR PROPHYLACTIC VACCINATION AGAINST STREPTOCOCCUS PNEUMONIAE (PNEUMOCOCCUS): ICD-10-CM

## 2017-11-16 DIAGNOSIS — E78.5 HYPERLIPIDEMIA LDL GOAL <130: ICD-10-CM

## 2017-11-16 DIAGNOSIS — I10 ESSENTIAL HYPERTENSION WITH GOAL BLOOD PRESSURE LESS THAN 140/90: ICD-10-CM

## 2017-11-16 PROCEDURE — 90732 PPSV23 VACC 2 YRS+ SUBQ/IM: CPT | Performed by: INTERNAL MEDICINE

## 2017-11-16 PROCEDURE — 99214 OFFICE O/P EST MOD 30 MIN: CPT | Mod: 25 | Performed by: INTERNAL MEDICINE

## 2017-11-16 PROCEDURE — 90662 IIV NO PRSV INCREASED AG IM: CPT | Performed by: INTERNAL MEDICINE

## 2017-11-16 PROCEDURE — G0008 ADMIN INFLUENZA VIRUS VAC: HCPCS | Performed by: INTERNAL MEDICINE

## 2017-11-16 PROCEDURE — G0009 ADMIN PNEUMOCOCCAL VACCINE: HCPCS | Performed by: INTERNAL MEDICINE

## 2017-11-16 RX ORDER — LISINOPRIL/HYDROCHLOROTHIAZIDE 10-12.5 MG
1 TABLET ORAL EVERY MORNING
Qty: 90 TABLET | Refills: 3 | Status: SHIPPED | OUTPATIENT
Start: 2017-11-16 | End: 2018-11-23

## 2017-11-16 RX ORDER — AMLODIPINE BESYLATE 5 MG/1
5 TABLET ORAL DAILY
Qty: 90 TABLET | Refills: 3 | Status: SHIPPED | OUTPATIENT
Start: 2017-11-16 | End: 2018-11-23

## 2017-11-16 RX ORDER — DULOXETIN HYDROCHLORIDE 30 MG/1
CAPSULE, DELAYED RELEASE ORAL
Qty: 270 CAPSULE | Refills: 3 | Status: SHIPPED | OUTPATIENT
Start: 2017-11-16 | End: 2018-11-23

## 2017-11-16 ASSESSMENT — PATIENT HEALTH QUESTIONNAIRE - PHQ9: SUM OF ALL RESPONSES TO PHQ QUESTIONS 1-9: 2

## 2017-11-16 NOTE — NURSING NOTE
"Chief Complaint   Patient presents with     Medication Refill     Hyperlipidemia     Hypertension     Depression     Follow up       Initial Pulse 102  Temp 98.6  F (37  C) (Oral)  Wt 188 lb (85.3 kg)  LMP 07/27/2009  SpO2 98%  BMI 29.01 kg/m2 Estimated body mass index is 29.01 kg/(m^2) as calculated from the following:    Height as of 5/22/17: 5' 7.5\" (1.715 m).    Weight as of this encounter: 188 lb (85.3 kg).  Medication Reconciliation: complete  "

## 2017-11-16 NOTE — PROGRESS NOTES

## 2017-11-16 NOTE — PROGRESS NOTES
SUBJECTIVE:   Yandy Zamudio is a 67 year old female who presents to clinic today for the following health issues:      Hyperlipidemia Follow-Up      Rate your low fat/cholesterol diet?: good    Taking statin?  No    Other lipid medications/supplements?:  Biotin    Hypertension Follow-up      Outpatient blood pressures are not being checked.    Low Salt Diet: low salt    Depression Followup    Status since last visit: Improved     See PHQ-9 for current symptoms.  Other associated symptoms: None    Complicating factors:   Significant life event:  No   Current substance abuse:  None  Anxiety or Panic symptoms:  No    PHQ-9 Score and MyChart F/U Questions 11/11/2016 5/22/2017 11/16/2017   Total Score 1 4 2   Q9: Suicide Ideation Not at all Not at all Not at all   F/U: Thoughts of suicide or self harm? - - -   F/U: Plan or intention for self harm? - - -   F/U: Acted on thoughts? - - -   F/U: Safety concerns for self or others? - - -        PHQ-9  English  PHQ-9   Any Language  Suicide Assessment Five-step Evaluation and Treatment (SAFE-T)      Amount of exercise or physical activity: 4-5 days/week for an average of 45-60 minutes    Problems taking medications regularly: No    Medication side effects: none  Diet: low salt, low fat/cholesterol and carbohydrate low    Pt's past medical history, family history, habits, medications and allergies were reviewed with the patient today.  See snap shot for  HCM status. Most recent lab results reviewed with pt. Problem list and histories reviewed & adjusted, as indicated.  Additional history as below:    No identified additional risks  The 10-year ASCVD risk score (Carey ROSA Jr, et al., 2013) is: 9.2%    Values used to calculate the score:      Age: 67 years      Sex: Female      Is Non- : No      Diabetic: No      Tobacco smoker: No      Systolic Blood Pressure: 126 mmHg      Is BP treated: Yes      HDL Cholesterol: 53 mg/dL      Total Cholesterol: 259  "mg/dL     Denies chest pain, shortness of breath, abdominal pain, headache, vision changes or side effects with medications.  Weight down only 1 pound from 6 mos ago but has been exercising and eating better  Since last appt and labs show the positive benefit  as below. Lipids remain elevated. Pt hesitant to start other statin therapy given hx side effects with Pravastatin. Mood good. PHQ=2    Lab Results   Component Value Date    GLC 88 11/09/2017     Lab Results   Component Value Date    A1C 5.4 11/09/2017     Lab Results   Component Value Date    CHOL 259 11/09/2017     Lab Results   Component Value Date     11/09/2017     Lab Results   Component Value Date    HDL 53 11/09/2017     Lab Results   Component Value Date    TRIG 170 11/09/2017     Lab Results   Component Value Date    CR 0.72 11/09/2017     Lab Results   Component Value Date    ALT 38 11/09/2017     Lab Results   Component Value Date    AST 25 11/09/2017     No results found for: MICROL  Lab Results   Component Value Date    TSH 2.48 05/22/2017       Additional ROS:   Constitutional, HEENT, Cardiovascular, Pulmonary, GI and , Neuro, MSK and Psych review of systems/symptoms are otherwise negative or unchanged from previous, except as noted above.      OBJECTIVE:  /68  Pulse 94  Temp 98.6  F (37  C) (Oral)  Wt 188 lb (85.3 kg)  LMP 07/27/2009  SpO2 98%  BMI 29.01 kg/m2   Estimated body mass index is 29.01 kg/(m^2) as calculated from the following:    Height as of 5/22/17: 5' 7.5\" (1.715 m).    Weight as of this encounter: 188 lb (85.3 kg).  Eye: PERRL, EOMI  HENT: ear canals and TM's normal and nose and mouth without ulcers or lesions   Neck: no adenopathy. Thyroid normal to palpation. No bruits  Pulm: Lungs clear to auscultation   CV: Regular rates and rhythm  GI: Soft, nontender, Normal active bowel sounds, No hepatosplenomegaly or masses palpable  Ext: Peripheral pulses intact. No edema.  Neuro: Normal strength and tone, sensory " exam grossly normal    Assessment/Plan: (See plan discussion below for further details)  1. Major depressive disorder, recurrent, severe without psychotic features (H)  Well controlled. Continue current medication  - DULoxetine (CYMBALTA) 30 MG EC capsule; TAKE THREE CAPSULES BY MOUTH ONCE DAILY IN THE MORNING  Dispense: 270 capsule; Refill: 3    2. Essential hypertension with goal blood pressure less than 140/90  Well controlled. Continue current medication  - lisinopril-hydrochlorothiazide (PRINZIDE/ZESTORETIC) 10-12.5 MG per tablet; Take 1 tablet by mouth every morning  Dispense: 90 tablet; Refill: 3  - amLODIPine (NORVASC) 5 MG tablet; Take 1 tablet (5 mg) by mouth daily  Dispense: 90 tablet; Refill: 3  - Basic metabolic panel; Future    3. Hyperlipidemia LDL goal <130   CAD risk indicates treat,ent with statin but pt hesitant. Will improve diet/exercise and repeat lipids 6 mos  - Lipid panel reflex to direct LDL Fasting; Future    4. Need for prophylactic vaccination against Streptococcus pneumoniae (pneumococcus)  - Pneumococcal vaccine 23 valent PPSV23  (Pneumovax) [84621]  - ADMIN MEDICARE: Pneumococcal Vaccine ()    5. Need for prophylactic vaccination and inoculation against influenza  - FLU VACCINE, INCREASED ANTIGEN, PRESV FREE, AGE 65+ [72791]  - Vaccine Administration, Initial [91280]  - ADMIN INFLUENZA (For MEDICARE Patients ONLY) []    Plan discussion:  Continue current meds  Prescriptions refilled.    Continue exercise  Reduce saturated fats (red meats, fried and processed foods) in your diet, increase intake of foods rich in Omega-3 fatty acids (fish, nuts, seeds, etc) and increase the amount of color on your plate with fruits and vegetables.   Call  [phone] or use uberall to schedule a future lab appointment  fasting in 6 months.   Vaccinations: Influenza and Pneumococcal              Omid Rivera MD  Internal Medicine Department  [facility]

## 2017-11-16 NOTE — MR AVS SNAPSHOT
After Visit Summary   11/16/2017    Yandy Zamudio    MRN: [id number]           Patient Information     Date Of Birth          1950        Visit Information        Provider Department      11/16/2017 3:30 PM Omid Rivera MD [facility]        Today's Diagnoses     Major depressive disorder, recurrent, severe without psychotic features (H)        Essential hypertension with goal blood pressure less than 140/90        Hyperlipidemia LDL goal <130        Need for prophylactic vaccination against Streptococcus pneumoniae (pneumococcus)        Need for prophylactic vaccination and inoculation against influenza          Care Instructions    Continue current meds  Prescriptions refilled.    Continue exercise  Reduce saturated fats (red meats, fried and processed foods) in your diet, increase intake of foods rich in Omega-3 fatty acids (fish, nuts, seeds, etc) and increase the amount of color on your plate with fruits and vegetables.   Call  [phone] or use [employer] to schedule a future lab appointment  fasting in 6 months.   Vaccinations: Influenza and Pneumococcal          Follow-ups after your visit        Who to contact     If you have questions or need follow up information about today's clinic visit or your schedule please contact [facility] directly at [phone].  Normal or non-critical lab and imaging results will be communicated to you by MyChart, letter or phone within 4 business days after the clinic has received the results. If you do not hear from us within 7 days, please contact the clinic through [employer] or phone. If you have a critical or abnormal lab result, we will notify you by phone as soon as possible.  Submit refill requests through [employer] or call your pharmacy and they will forward the refill request to us. Please allow 3 business days for your refill to be completed.          Additional Information About Your Visit         D[employer] Information     [employer] gives you secure access to your electronic health record. If you see a primary care provider, you can also send messages to your care team and make appointments. If you have questions, please call your primary care clinic.  If you do not have a primary care provider, please call [phone] and they will assist you.        Care EveryWhere ID     This is your Care EveryWhere ID. This could be used by other organizations to access your [facility] medical records  [id number]        Your Vitals Were     Pulse Temperature Last Period Pulse Oximetry BMI (Body Mass Index)       94 98.6  F (37  C) (Oral) 07/27/2009 98% 29.01 kg/m2        Blood Pressure from Last 3 Encounters:   11/16/17 126/68   05/22/17 132/70   04/17/17 (!) 179/106    Weight from Last 3 Encounters:   11/16/17 188 lb (85.3 kg)   05/22/17 189 lb 1.6 oz (85.8 kg)   04/17/17 181 lb (82.1 kg)              We Performed the Following     ADMIN INFLUENZA (For MEDICARE Patients ONLY) []     ADMIN MEDICARE: Pneumococcal Vaccine ()     FLU VACCINE, INCREASED ANTIGEN, PRESV FREE, AGE 65+ [59768]     Pneumococcal vaccine 23 valent PPSV23  (Pneumovax) [71858]     Vaccine Administration, Initial [26399]          Today's Medication Changes          These changes are accurate as of: 11/16/17 11:59 PM.  If you have any questions, ask your nurse or doctor.               These medicines have changed or have updated prescriptions.        Dose/Directions    DULoxetine 30 MG EC capsule   Commonly known as:  CYMBALTA   This may have changed:  See the new instructions.   Used for:  Major depressive disorder, recurrent, severe without psychotic features (H)   Changed by:  Omid Rivera MD        TAKE THREE CAPSULES BY MOUTH ONCE DAILY IN THE MORNING   Quantity:  270 capsule   Refills:  3            Where to get your medicines      These medications were sent to [facility] [address] [place] [address] - [id number] [place] [address]  [id number]  [facility] [place], [facility] [address]     Phone:  [phone]     amLODIPine 5 MG tablet    DULoxetine 30 MG EC capsule    lisinopril-hydrochlorothiazide 10-12.5 MG per tablet                Primary Care Provider Office Phone # Fax #    Omid Rivera [phone]       [address] [facility] [address]        Equal Access to Services     ROE GREEN : Hadii aad ku hadasho Soomaali, waaxda luqadaha, qaybta kaalmada adeegyada, waxay idiin hayaan adeeg kharash la'aan ah. So [facility] [phone].    ATENCIÓN: Si habla español, tiene a lynn disposición servicios gratuitos de asistencia lingüística. Llame al [phone].    We comply with applicable federal civil rights laws and [place] laws. We do not discriminate on the basis of race, color, national origin, age, disability, sex, sexual orientation, or gender identity.            Thank you!     Thank you for choosing [facility]  for your care. Our goal is always to provide you with excellent care. Hearing back from our patients is one way we can continue to improve our services. Please take a few minutes to complete the written survey that you may receive in the mail after your visit with us. Thank you!             Your Updated Medication List - Protect others around you: Learn how to safely use, store and throw away your medicines at www.disposemymeds.org.          This list is accurate as of: 11/16/17 11:59 PM.  Always use your most recent med list.                   Brand Name Dispense Instructions for use Diagnosis    amLODIPine 5 MG tablet    NORVASC    90 tablet    Take 1 tablet (5 mg) by mouth daily    Essential hypertension with goal blood pressure less than 140/90       BIOTIN 5000 PO           DULoxetine 30 MG EC capsule    CYMBALTA    270 capsule    TAKE THREE CAPSULES BY MOUTH ONCE DAILY IN THE MORNING    Major depressive disorder, recurrent, severe without psychotic features (H)       lisinopril-hydrochlorothiazide  10-12.5 MG per tablet    PRINZIDE/ZESTORETIC    90 tablet    Take 1 tablet by mouth every morning    Essential hypertension with goal blood pressure less than 140/90       meloxicam 15 MG tablet    MOBIC    30 tablet    Take 1 tablet (15 mg) by mouth daily    Primary osteoarthritis, unspecified site       Multi-vitamin Tabs tablet      Take 1 tablet by mouth daily

## 2017-12-04 NOTE — PATIENT INSTRUCTIONS
Continue current meds  Prescriptions refilled.    Continue exercise  Reduce saturated fats (red meats, fried and processed foods) in your diet, increase intake of foods rich in Omega-3 fatty acids (fish, nuts, seeds, etc) and increase the amount of color on your plate with fruits and vegetables.   Call  [phone] or use [employer] to schedule a future lab appointment  fasting in 6 months.   Vaccinations: Influenza and Pneumococcal

## 2018-05-09 DIAGNOSIS — M19.91 PRIMARY OSTEOARTHRITIS, UNSPECIFIED SITE: ICD-10-CM

## 2018-05-09 RX ORDER — MELOXICAM 15 MG/1
TABLET ORAL
Qty: 90 TABLET | Refills: 1 | Status: SHIPPED | OUTPATIENT
Start: 2018-05-09 | End: 2018-11-04

## 2018-05-09 NOTE — TELEPHONE ENCOUNTER
"Requested Prescriptions   Pending Prescriptions Disp Refills     meloxicam (MOBIC) 15 MG tablet [Pharmacy Med Name: MELOXICAM 15MG      TAB] 30 tablet 11     Sig: TAKE ONE TABLET BY MOUTH ONCE DAILY    NSAID Medications Failed    5/9/2018  9:51 AM       Failed - Patient is age 6-64 years       Failed - Normal CBC on file in past 12 months    Recent Labs   Lab Test  07/17/10   1330   WBC  8.6   RBC  5.43*   HGB  15.6   HCT  46.6   PLT  271            Passed - Blood pressure under 140/90 in past 12 months    BP Readings from Last 3 Encounters:   11/16/17 126/68   05/22/17 132/70   04/17/17 (!) 179/106                Passed - Normal ALT on file in past 12 months    Recent Labs   Lab Test  11/09/17   1036   ALT  38            Passed - Normal AST on file in past 12 months    Recent Labs   Lab Test  11/09/17   1036   AST  25            Passed - Recent (12 mo) or future (30 days) visit within the authorizing provider's specialty    Patient had office visit in the last 12 months or has a visit in the next 30 days with authorizing provider or within the authorizing provider's specialty.  See \"Patient Info\" tab in inbasket, or \"Choose Columns\" in Meds & Orders section of the refill encounter.           Passed - No active pregnancy on record       Passed - Normal serum creatinine on file in past 12 months    Recent Labs   Lab Test  11/09/17   1036   CR  0.72            Passed - No positive pregnancy test in past 12 months        Routing refill request to provider for review/approval because:  Labs out of range:  cbc  Labs not current:  cbc        "

## 2018-10-01 ENCOUNTER — E-VISIT (OUTPATIENT)
Dept: INTERNAL MEDICINE | Facility: CLINIC | Age: 68
End: 2018-10-01
Payer: COMMERCIAL

## 2018-10-01 DIAGNOSIS — M25.561 ACUTE PAIN OF BOTH KNEES: Primary | ICD-10-CM

## 2018-10-01 DIAGNOSIS — M25.562 ACUTE PAIN OF BOTH KNEES: Primary | ICD-10-CM

## 2018-10-01 NOTE — MR AVS SNAPSHOT
After Visit Summary   10/1/2018    Yandy Zamudio    MRN: [id number]           Patient Information     Date Of Birth          1950        Visit Information        Provider Department      10/1/2018 8:18 PM Omid Rivera MD [facility]        Today's Diagnoses     Acute pain of both knees    -  1       Follow-ups after your visit        Additional Services     ORTHO  REFERRAL       [facility] is referring you to the Orthopedic  Services at [facility].       The  Representative will assist you in the coordination of your Orthopedic and Musculoskeletal Care as prescribed by your physician.    The  Representative will call you within 1 business day to help schedule your appointment, or you may contact the  Representative at:    All areas ~ [phone]     Type of Referral : Non Surgical / Sport Medicine       Timeframe requested: 3 - 5 days    Coverage of these services is subject to the terms and limitations of your health insurance plan.  Please call member services at your health plan with any benefit or coverage questions.      If X-rays, CT or MRI's have been performed, please contact the facility where they were done to arrange for , prior to your scheduled appointment.  Please bring this referral request to your appointment and present it to your specialist.                  Who to contact     If you have questions or need follow up information about today's clinic visit or your schedule please contact [facility] directly at [phone].  Normal or non-critical lab and imaging results will be communicated to you by MyChart, letter or phone within 4 business days after the clinic has received the results. If you do not hear from us within 7 days, please contact the clinic through MyChart or phone. If you have a critical or abnormal lab result, we will  notify you by phone as soon as possible.  Submit refill requests through [employer] or call your pharmacy and they will forward the refill request to us. Please allow 3 business days for your refill to be completed.          Additional Information About Your Visit        [employer] Information     [employer] gives you secure access to your electronic health record. If you see a primary care provider, you can also send messages to your care team and make appointments. If you have questions, please call your primary care clinic.  If you do not have a primary care provider, please call [phone] and they will assist you.        Care EveryWhere ID     This is your Care EveryWhere ID. This could be used by other organizations to access your [facility] medical records  [id number]        Your Vitals Were     Last Period                   07/27/2009            Blood Pressure from Last 3 Encounters:   11/16/17 126/68   05/22/17 132/70   04/17/17 (!) 179/106    Weight from Last 3 Encounters:   11/16/17 188 lb (85.3 kg)   05/22/17 189 lb 1.6 oz (85.8 kg)   04/17/17 181 lb (82.1 kg)              We Performed the Following     ORTHO  REFERRAL        Primary Care Provider Office Phone # Fax #    Omid Rivera [phone]       [address] [facility] [address]        Equal Access to Services     ROE GREEN : Hadii aad ku hadasho Soomaali, waaxda luqadaha, qaybta kaalmada adeegyada, waxay idiin hayaan raul mcintyre . So [facility] [phone].    ATENCIÓN: Si habla español, tiene a lynn disposición servicios gratuitos de asistencia lingüística. Llame al [phone].    We comply with applicable federal civil rights laws and [place] laws. We do not discriminate on the basis of race, color, national origin, age, disability, sex, sexual orientation, or gender identity.            Thank you!     Thank you for choosing [facility]  for your care. Our goal is always to provide you with  excellent care. Hearing back from our patients is one way we can continue to improve our services. Please take a few minutes to complete the written survey that you may receive in the mail after your visit with us. Thank you!             Your Updated Medication List - Protect others around you: Learn how to safely use, store and throw away your medicines at www.disposemymeds.org.          This list is accurate as of 10/1/18 11:59 PM.  Always use your most recent med list.                   Brand Name Dispense Instructions for use Diagnosis    amLODIPine 5 MG tablet    NORVASC    90 tablet    Take 1 tablet (5 mg) by mouth daily    Essential hypertension with goal blood pressure less than 140/90       BIOTIN 5000 PO           DULoxetine 30 MG EC capsule    CYMBALTA    270 capsule    TAKE THREE CAPSULES BY MOUTH ONCE DAILY IN THE MORNING    Major depressive disorder, recurrent, severe without psychotic features (H)       lisinopril-hydrochlorothiazide 10-12.5 MG per tablet    PRINZIDE/ZESTORETIC    90 tablet    Take 1 tablet by mouth every morning    Essential hypertension with goal blood pressure less than 140/90       meloxicam 15 MG tablet    MOBIC    90 tablet    TAKE ONE TABLET BY MOUTH ONCE DAILY    Primary osteoarthritis, unspecified site       Multi-vitamin Tabs tablet      Take 1 tablet by mouth daily

## 2018-10-02 NOTE — TELEPHONE ENCOUNTER
Spoke with pt. Knee pain bilateral has been present for 1 month. NO sense of instability or gross swelling. Has not responded to ice and current NSAIDS. Pt to see [facility] for further evaluation, possible cortisone injection vs PT vs other. No billing being submitted since only doing a referral

## 2018-10-06 ENCOUNTER — OFFICE VISIT (OUTPATIENT)
Dept: ORTHOPEDICS | Facility: CLINIC | Age: 68
End: 2018-10-06
Payer: COMMERCIAL

## 2018-10-06 ENCOUNTER — RADIANT APPOINTMENT (OUTPATIENT)
Dept: GENERAL RADIOLOGY | Facility: CLINIC | Age: 68
End: 2018-10-06
Attending: FAMILY MEDICINE
Payer: COMMERCIAL

## 2018-10-06 VITALS
WEIGHT: 185 LBS | DIASTOLIC BLOOD PRESSURE: 70 MMHG | HEIGHT: 67 IN | SYSTOLIC BLOOD PRESSURE: 132 MMHG | BODY MASS INDEX: 29.03 KG/M2

## 2018-10-06 DIAGNOSIS — M25.561 ACUTE PAIN OF RIGHT KNEE: Primary | ICD-10-CM

## 2018-10-06 DIAGNOSIS — M25.561 ACUTE PAIN OF RIGHT KNEE: ICD-10-CM

## 2018-10-06 DIAGNOSIS — M17.11 PRIMARY OSTEOARTHRITIS OF RIGHT KNEE: ICD-10-CM

## 2018-10-06 DIAGNOSIS — M22.41 CHONDROMALACIA OF RIGHT PATELLA: ICD-10-CM

## 2018-10-06 PROCEDURE — 20611 DRAIN/INJ JOINT/BURSA W/US: CPT | Mod: RT | Performed by: FAMILY MEDICINE

## 2018-10-06 PROCEDURE — 73562 X-RAY EXAM OF KNEE 3: CPT

## 2018-10-06 PROCEDURE — 99203 OFFICE O/P NEW LOW 30 MIN: CPT | Mod: 25 | Performed by: FAMILY MEDICINE

## 2018-10-06 RX ADMIN — METHYLPREDNISOLONE ACETATE 40 MG: 40 INJECTION, SUSPENSION INTRA-ARTICULAR; INTRALESIONAL; INTRAMUSCULAR; SOFT TISSUE at 10:08

## 2018-10-06 NOTE — MR AVS SNAPSHOT
After Visit Summary   10/6/2018    Yandy Zamudio    MRN: [id number]           Patient Information     Date Of Birth          1950        Visit Information        Provider Department      10/6/2018 9:20 AM Dylan Almazan DO [facility]        Today's Diagnoses     Acute pain of right knee    -  1    Primary osteoarthritis of right knee        Chondromalacia of right patella          Care Instructions    1. Acute pain of right knee    2. Primary osteoarthritis of right knee    3. Chondromalacia of right patella      Activity modification as discussed - stairs, squats, lunges, sit - stand will be challenging / aggravating  Physical therapy: [place] [facility] - [phone]  Steroid injection of the right knee was performed today in clinic  - Ok to shower  - No bathtub, hot tub or swimming for 2 days  - The lidocaine (what is giving you pain relief right now) will likely stop working in 1-2 hours.  You will then have pain again, similar to before you received the injection. The corticosteroid will not start working until approximately 1-2 weeks from now.  Can repeat in 4 months or anytime thereafter    Follow-up as needed.    Official Walk with a Doc Chapter  Join me downstairs in the lobby of the [facility] the 1st Saturday of every month at 1pm for a free health talk. Come, listen and walk at your own pace!            Follow-ups after your visit        Who to contact     If you have questions or need follow up information about today's clinic visit or your schedule please contact [facility] directly at [phone].  Normal or non-critical lab and imaging results will be communicated to you by MyChart, letter or phone within 4 business days after the clinic has received the results. If you do not hear from us within 7 days, please contact the clinic through MyChart or phone. If you have a critical or abnormal lab  "result, we will notify you by phone as soon as possible.  Submit refill requests through [employer] or call your pharmacy and they will forward the refill request to us. Please allow 3 business days for your refill to be completed.          Additional Information About Your Visit        [employer] Information     [employer] gives you secure access to your electronic health record. If you see a primary care provider, you can also send messages to your care team and make appointments. If you have questions, please call your primary care clinic.  If you do not have a primary care provider, please call [phone] and they will assist you.        Care EveryWhere ID     This is your Care EveryWhere ID. This could be used by other organizations to access your [place] medical records  [id number]        Your Vitals Were     Height Last Period BMI (Body Mass Index)             5' 7\" (1.702 m) 07/27/2009 28.98 kg/m2          Blood Pressure from Last 3 Encounters:   10/06/18 132/70   11/16/17 126/68   05/22/17 132/70    Weight from Last 3 Encounters:   10/06/18 185 lb (83.9 kg)   11/16/17 188 lb (85.3 kg)   05/22/17 189 lb 1.6 oz (85.8 kg)               Primary Care Provider Office Phone # Fax #    Omid Rivera [phone]       [address] [facility] [place] [address]        Equal Access to Services     [facility] AH: Hadii aad ku hadasho Soomaali, waaxda luqadaha, qaybta kaalmada adeegyada, jaren mcintyre . So [facility] [phone].    ATENCIÓN: Si habla español, tiene a lynn disposición servicios gratuitos de asistencia lingüística. Llame al [phone].    We comply with applicable federal civil rights laws and [place] laws. We do not discriminate on the basis of race, color, national origin, age, disability, sex, sexual orientation, or gender identity.            Thank you!     Thank you for choosing [facility]  for your care. Our goal is always to provide you with " excellent care. Hearing back from our patients is one way we can continue to improve our services. Please take a few minutes to complete the written survey that you may receive in the mail after your visit with us. Thank you!             Your Updated Medication List - Protect others around you: Learn how to safely use, store and throw away your medicines at www.disposemymeds.org.          This list is accurate as of 10/6/18  9:49 AM.  Always use your most recent med list.                   Brand Name Dispense Instructions for use Diagnosis    amLODIPine 5 MG tablet    NORVASC    90 tablet    Take 1 tablet (5 mg) by mouth daily    Essential hypertension with goal blood pressure less than 140/90       BIOTIN 5000 PO           DULoxetine 30 MG EC capsule    CYMBALTA    270 capsule    TAKE THREE CAPSULES BY MOUTH ONCE DAILY IN THE MORNING    Major depressive disorder, recurrent, severe without psychotic features (H)       lisinopril-hydrochlorothiazide 10-12.5 MG per tablet    PRINZIDE/ZESTORETIC    90 tablet    Take 1 tablet by mouth every morning    Essential hypertension with goal blood pressure less than 140/90       meloxicam 15 MG tablet    MOBIC    90 tablet    TAKE ONE TABLET BY MOUTH ONCE DAILY    Primary osteoarthritis, unspecified site       Multi-vitamin Tabs tablet      Take 1 tablet by mouth daily

## 2018-10-06 NOTE — LETTER
10/6/2018         RE: Yandy Zamudio  [address] [facility] [address]        Dear Colleague,    [facility] you [facility]. Please see a copy of my visit note below.    ASSESSMENT & PLAN    1. Acute pain of right knee    2. Primary osteoarthritis of right knee    3. Chondromalacia of right patella      Activity modification as discussed - stairs, squats, lunges, sit - stand will be challenging / aggravating  Encourage to stay as active as possible and increase her walking when able  Physical therapy: [place] [facility] - [phone]  Steroid injection of the right knee was performed today in clinic  Can repeat in 4 months or anytime thereafter    Follow-up as needed.    -----    SUBJECTIVE  Yandy Zamudio is a/an 68 year old female who is seen in consultation at the request of  Omid Rivera M.D. for evaluation of bilateral knee pain. The patient is seen by themselves.    Onset: 5 weeks ago. Denies any acute injury/trauma. Notes that pain started after doing some household projects that included climbing ladder and kneeling to do charley.   Location of Pain: right knee, underside of the patella and medial patellar facet  Rating of Pain at worst: 8/10  Rating of Pain Currently: 0/10  Worsened by: walking, sitting with knee flexed for prolonged period of time, knee extension  Better with: nothing in particular   Treatments tried: rest/activity avoidance, ice , tylenol and other medications: Mobic  Associated symptoms: clicking  Orthopedic history: multiple joint pains, fibromyalgia   Relevant surgical history: NO  Patient Social History: recently retired    Patient's past medical, surgical, social, and family histories were reviewed today and no changes are noted.    REVIEW OF SYSTEMS:  10 point ROS is negative other than symptoms noted above in HPI, Past Medical History or as stated below  Constitutional: NEGATIVE for  "fever, chills, change in weight  Skin: NEGATIVE for worrisome rashes, moles or lesions  GI/: NEGATIVE for bowel or bladder changes  Neuro: NEGATIVE for weakness, dizziness or paresthesias    OBJECTIVE:  /70  Ht 5' 7\" (1.702 m)  Wt 185 lb (83.9 kg)  LMP 07/27/2009  BMI 28.98 kg/m2   General: healthy, alert and in no distress  HEENT: no scleral icterus or conjunctival erythema  Skin: no suspicious lesions or rash. No jaundice.  CV: no pedal edema  Resp: normal respiratory effort without conversational dyspnea   Psych: normal mood and affect  Gait: normal steady gait with appropriate coordination and balance  Neuro: Normal light sensory exam of lower extremity  MSK:  RIGHT KNEE  Inspection:    normal alignment  Palpation:    Tender about the medial joint line. Remainder of bony and ligamentous landmarks are nontender.    No effusion is present    Patellofemoral crepitus is Present  Range of Motion:     -30 extension to 1200 flexion  Strength:  Gluteus weakness    Extensor mechanism intact  Special Tests:    Positive: Patellar grind    Negative: MCL/valgus stress (0 & 30 deg), LCL/varus stress (0 & 30 deg), Lachman's, posterior drawer, Andrew's    Independent visualization of the below image:  Recent Results (from the past 24 hour(s))   XR Knee Standing AP Bilat [place] Bilat Lat Right    Narrative    RIGHT KNEE THREE VIEWS AND LEFT KNEE TWO VIEWS   10/6/2018 9:33 AM    HISTORY:  Acute pain of right knee.    COMPARISON: None.    FINDINGS: No fracture or osseous lesion is demonstrated. There is  moderate joint space loss in the medial compartment of the right knee  and to a mild degree in the medial compartment of the left knee. There  are likely mild degenerative changes in the patellofemoral joints  bilaterally. No other abnormality is seen.      Impression    IMPRESSION: Degenerative changes, most prominent in the medial  compartment of the right knee.     BEBE CHILEL MD     Large Joint " Injection/Arthocentesis  Date/Time: 10/6/2018 10:08 AM  Performed by: BOGDAN ALMAZAN  Authorized by: BOGDAN ALMAZAN     Indications:  Osteoarthritis and pain  Needle Size:  22 G  Guidance: ultrasound    Approach:  Superolateral  Location:  Knee  Site:  R knee joint  Medications:  40 mg methylPREDNISolone acetate 40 MG/ML  Outcome:  Tolerated well, no immediate complications  Procedure discussed: discussed risks, benefits, and alternatives    Consent Given by:  Patient  Timeout: timeout called immediately prior to procedure    Prep: patient was prepped and draped in usual sterile fashion       Pain noted to be a 3/10 before completion of the procedure and 0/10 after completion of the procedure.          Patient's conditions were thoroughly discussed during today's visit with greater than 50% of the visit spent counseling the patient with total time spent face-to-face with the patient being 20 minutes injection taking 5 minutes    Bogdan Almazan DO [facility]      Again, thank you for allowing me to participate in the care of your patient.        Sincerely,        Bogdan Almazan DO

## 2018-10-06 NOTE — PATIENT INSTRUCTIONS
1. Acute pain of right knee    2. Primary osteoarthritis of right knee    3. Chondromalacia of right patella      Activity modification as discussed - stairs, squats, lunges, sit - stand will be challenging / aggravating  Physical therapy: [place] [facility] - [phone]  Steroid injection of the right knee was performed today in clinic  - Ok to shower  - No bathtub, hot tub or swimming for 2 days  - The lidocaine (what is giving you pain relief right now) will likely stop working in 1-2 hours.  You will then have pain again, similar to before you received the injection. The corticosteroid will not start working until approximately 1-2 weeks from now.  Can repeat in 4 months or anytime thereafter    Follow-up as needed.    Official Walk with a Doc Chapter  Join me downstairs in the lobby of the [facility] the 1st Saturday of every month at 1pm for a free health talk. Come, listen and walk at your own pace!

## 2018-10-06 NOTE — PROGRESS NOTES
"ASSESSMENT & PLAN    1. Acute pain of right knee    2. Primary osteoarthritis of right knee    3. Chondromalacia of right patella      Activity modification as discussed - stairs, squats, lunges, sit - stand will be challenging / aggravating  Encourage to stay as active as possible and increase her walking when able  Physical therapy: [place] [facility] - [phone]  Steroid injection of the right knee was performed today in clinic  Can repeat in 4 months or anytime thereafter    Follow-up as needed.    -----    SUBJECTIVE  Yandy Zamudio is a/an 68 year old female who is seen in consultation at the request of  Omid Rivera M.D. for evaluation of bilateral knee pain. The patient is seen by themselves.    Onset: 5 weeks ago. Denies any acute injury/trauma. Notes that pain started after doing some household projects that included climbing ladder and kneeling to do charley.   Location of Pain: right knee, underside of the patella and medial patellar facet  Rating of Pain at worst: 8/10  Rating of Pain Currently: 0/10  Worsened by: walking, sitting with knee flexed for prolonged period of time, knee extension  Better with: nothing in particular   Treatments tried: rest/activity avoidance, ice , tylenol and other medications: Mobic  Associated symptoms: clicking  Orthopedic history: multiple joint pains, fibromyalgia   Relevant surgical history: NO  Patient Social History: recently retired    Patient's past medical, surgical, social, and family histories were reviewed today and no changes are noted.    REVIEW OF SYSTEMS:  10 point ROS is negative other than symptoms noted above in HPI, Past Medical History or as stated below  Constitutional: NEGATIVE for fever, chills, change in weight  Skin: NEGATIVE for worrisome rashes, moles or lesions  GI/: NEGATIVE for bowel or bladder changes  Neuro: NEGATIVE for weakness, dizziness or paresthesias    OBJECTIVE:  /70  Ht 5' 7\" (1.702 m)  Wt 185 lb (83.9 " kg)  LMP 07/27/2009  BMI 28.98 kg/m2   General: healthy, alert and in no distress  HEENT: no scleral icterus or conjunctival erythema  Skin: no suspicious lesions or rash. No jaundice.  CV: no pedal edema  Resp: normal respiratory effort without conversational dyspnea   Psych: normal mood and affect  Gait: normal steady gait with appropriate coordination and balance  Neuro: Normal light sensory exam of lower extremity  MSK:  RIGHT KNEE  Inspection:    normal alignment  Palpation:    Tender about the medial joint line. Remainder of bony and ligamentous landmarks are nontender.    No effusion is present    Patellofemoral crepitus is Present  Range of Motion:     -30 extension to 1200 flexion  Strength:  Gluteus weakness    Extensor mechanism intact  Special Tests:    Positive: Patellar grind    Negative: MCL/valgus stress (0 & 30 deg), LCL/varus stress (0 & 30 deg), Lachman's, posterior drawer, Andrew's    Independent visualization of the below image:  Recent Results (from the past 24 hour(s))   XR Knee Standing AP Bilat [place] Bilat Lat Right    Narrative    RIGHT KNEE THREE VIEWS AND LEFT KNEE TWO VIEWS   10/6/2018 9:33 AM    HISTORY:  Acute pain of right knee.    COMPARISON: None.    FINDINGS: No fracture or osseous lesion is demonstrated. There is  moderate joint space loss in the medial compartment of the right knee  and to a mild degree in the medial compartment of the left knee. There  are likely mild degenerative changes in the patellofemoral joints  bilaterally. No other abnormality is seen.      Impression    IMPRESSION: Degenerative changes, most prominent in the medial  compartment of the right knee.     BEBE CHILEL MD     Large Joint Injection/Arthocentesis  Date/Time: 10/6/2018 10:08 AM  Performed by: BOGDAN BRADLEY  Authorized by: BOGDAN BRADLEY     Indications:  Osteoarthritis and pain  Needle Size:  22 G  Guidance: ultrasound    Approach:  Superolateral  Location:  Knee  Site:   R knee joint  Medications:  40 mg methylPREDNISolone acetate 40 MG/ML  Outcome:  Tolerated well, no immediate complications  Procedure discussed: discussed risks, benefits, and alternatives    Consent Given by:  Patient  Timeout: timeout called immediately prior to procedure    Prep: patient was prepped and draped in usual sterile fashion       Pain noted to be a 3/10 before completion of the procedure and 0/10 after completion of the procedure.          Patient's conditions were thoroughly discussed during today's visit with greater than 50% of the visit spent counseling the patient with total time spent face-to-face with the patient being 20 minutes injection taking 5 minutes    Dylan Almazan DO [facility]

## 2018-10-09 RX ORDER — METHYLPREDNISOLONE ACETATE 40 MG/ML
40 INJECTION, SUSPENSION INTRA-ARTICULAR; INTRALESIONAL; INTRAMUSCULAR; SOFT TISSUE
Status: DISCONTINUED | OUTPATIENT
Start: 2018-10-06 | End: 2020-06-09

## 2018-10-12 ENCOUNTER — THERAPY VISIT (OUTPATIENT)
Dept: PHYSICAL THERAPY | Facility: CLINIC | Age: 68
End: 2018-10-12
Payer: COMMERCIAL

## 2018-10-12 DIAGNOSIS — M22.41 CHONDROMALACIA OF RIGHT PATELLA: ICD-10-CM

## 2018-10-12 DIAGNOSIS — M17.11 PRIMARY OSTEOARTHRITIS OF RIGHT KNEE: ICD-10-CM

## 2018-10-12 PROCEDURE — 97161 PT EVAL LOW COMPLEX 20 MIN: CPT | Mod: GP | Performed by: PHYSICAL THERAPIST

## 2018-10-12 PROCEDURE — 97112 NEUROMUSCULAR REEDUCATION: CPT | Mod: GP | Performed by: PHYSICAL THERAPIST

## 2018-10-12 PROCEDURE — G8979 MOBILITY GOAL STATUS: HCPCS | Mod: GP | Performed by: PHYSICAL THERAPIST

## 2018-10-12 PROCEDURE — G8978 MOBILITY CURRENT STATUS: HCPCS | Mod: GP | Performed by: PHYSICAL THERAPIST

## 2018-10-12 NOTE — PROGRESS NOTES
[place] [facility] Initial Evaluation  Subjective:  HPI                    Objective:  System    Physical Exam    General     [facility]: Physical Therapy Initial Evaluation   Oct 12, 2018  Precautions/Restrictions/Physician instructions:   Per Orders: Taping    Subjective:   Chief Complaint:    Pain: Right knee pain anteriorly, some zings along the inside of the knee   Numbness/Tingling: None   Weakness: None   Stiffness: in the right knee   Other: None  New/Recurrent/Chronic: New  DOI/onset: Around September 1st, 2018   Referral Date: 10/6/2018 - Dr. Dylan Almazan  Mechanism of onset: overuse, climbing ladders  PMH/surgical history/trauma:    - osteoarthritis (knee, left big toe, suspects others)   - osteoprosis   - overweight   - HTN   - Depression   - Fibromyalgia   - Menopausal  General health as reported by patient: Excellent    Medications: HTN, Anti-depressants, anti-inflammatory, steroids (recent injection),   Occupation: Artist/Teacher  Job duties: prolonged sitting, prolonged standing, repetitive tasks,   Previous Treatment (Effect): Corticosteroid injection (10/6/2018 helpful),   Imaging: X-Ray: IMPRESSION: Degenerative changes, most prominent in the medial compartment of the right knee.   AM/PM: worse with activity,   Quality of Pain: Dull  Pain: 3/10 at present, 0/10 at best, 3/10 at worst since the injection  Worse: walking, stairs, getting out of the car (tough to straighten after prolonged bending),   Better: rest,   Progression of Symptoms since onset: better since the injection   Hx of Falls: Had a slip on the steps, but caught herself, many years ago. Had other occasional slip on ice.   Sleeping: Did wake her before the injection, has only had one instance while in bed with the knee slightly bent.    Current Functional Status: walking - Has not pushed to limits, 1 hour is her recent max while starting and stopping during shopping, can feel herself slow  down as she goes ; stairs - one step at a time, use of the handrail ; kneeling - tough getting up and down from kneeling position, increased pain.   Previous Functional Status: stairs - could go foot over foot with minimal use of handrail ; walking - able to walk 30 minutes per day ; kneeling - had some issues, but could get up and down.   Current HEP/exercise regimen: walking  Transportation to Therapy: Independent  Live with Others:  lives at home with her  Red Flags:   - Patient denies the following: Locking, Catching (knee);  Fever ; Weakness ; Numbness/Tingling ;     Patient's Goal(s): Learn how to not re-injure herself.         Objective:    Standing Posture: weight shifted to the left. Patient reports pain in the right hip when putting pressure through the right heel.     Gait: Rigid gait        AROM: (* indicates patient's pain)   PROM:(over pressure)   R  L R L   Hyperextension 0 5     Extension 0 0     Flexion 133 135       Strength:   R L   HIP     Flex 4+ 5   Abd 3+ 3+   Ext 5 5   KNEE     Ext 4+* 5   Flex 4+* 5     Patellar tracking, positioning, mobility: hypomobile patellae bilaterally    Other:  - stepdown control improved with tape to the patella          Assessment/Plan:    Patient is a 87 year old female with right side knee complaints.    Patient has the following significant findings with corresponding treatment plan.                PT Diagnosis: Right knee pain with comorbid hip weakness    Pain -  hot/cold therapy, manual therapy, splint/taping/bracing/orthotics, self management, education and home program  Decreased ROM/flexibility - manual therapy, therapeutic exercise, therapeutic activity and home program  Decreased strength - therapeutic exercise, therapeutic activities and home program  Decreased function - therapeutic activities and home program  Impaired posture - neuro re-education, therapeutic activities and home program      Therapy Evaluation Codes:   1) History comprised  of:   Personal factors that impact the plan of care:      None.    Comorbidity factors that impact the plan of care are:      Depression, Fibromyalgia and Osteoarthritis.     Medications impacting care: Anti-inflammatory and Steroids.  2) Examination of Body Systems comprised of:   Body structures and functions that impact the plan of care:      Hip, Knee and Foot.   Activity limitations that impact the plan of care are:      Squatting/kneeling, Stairs and Walking.  3) Clinical presentation characteristics are:   Stable/Uncomplicated.  4) Decision-Making    Low complexity using standardized patient assessment instrument and/or measureable assessment of functional outcome.  Cumulative Therapy Evaluation is: Low complexity.    Previous and current functional limitations:  (See Goal Flow Sheet for this information)    Short term and Long term goals: (See Goal Flow Sheet for this information)     Communication ability:  Patient appears to be able to clearly communicate and understand verbal and written communication and follow directions correctly.  Treatment Explanation - The following has been discussed with the patient:   RX ordered/plan of care  Anticipated outcomes  Possible risks and side effects  This patient would benefit from PT intervention to resume normal activities.   Rehab potential is good.    Frequency:  1 X week, once daily  Duration:  for 4 weeks tapering to 2 X a month over 4 weeks  Discharge Plan:  Achieve all LTG.  Independent in home treatment program.  Reach maximal therapeutic benefit.    Please refer to the daily flowsheet for treatment today, total treatment time and time spent performing 1:1 timed codes.

## 2018-10-18 ENCOUNTER — THERAPY VISIT (OUTPATIENT)
Dept: PHYSICAL THERAPY | Facility: CLINIC | Age: 68
End: 2018-10-18
Payer: COMMERCIAL

## 2018-10-18 DIAGNOSIS — M22.41 CHONDROMALACIA OF RIGHT PATELLA: ICD-10-CM

## 2018-10-18 PROCEDURE — 97112 NEUROMUSCULAR REEDUCATION: CPT | Mod: GP | Performed by: PHYSICAL THERAPIST

## 2018-10-18 PROCEDURE — 97110 THERAPEUTIC EXERCISES: CPT | Mod: GP | Performed by: PHYSICAL THERAPIST

## 2018-10-23 ENCOUNTER — THERAPY VISIT (OUTPATIENT)
Dept: PHYSICAL THERAPY | Facility: CLINIC | Age: 68
End: 2018-10-23
Payer: COMMERCIAL

## 2018-10-23 DIAGNOSIS — M22.41 CHONDROMALACIA OF RIGHT PATELLA: ICD-10-CM

## 2018-10-23 PROCEDURE — 97112 NEUROMUSCULAR REEDUCATION: CPT | Mod: GP | Performed by: PHYSICAL THERAPIST

## 2018-10-23 PROCEDURE — 97110 THERAPEUTIC EXERCISES: CPT | Mod: GP | Performed by: PHYSICAL THERAPIST

## 2018-11-02 ENCOUNTER — THERAPY VISIT (OUTPATIENT)
Dept: PHYSICAL THERAPY | Facility: CLINIC | Age: 68
End: 2018-11-02
Payer: COMMERCIAL

## 2018-11-02 DIAGNOSIS — M22.41 CHONDROMALACIA OF RIGHT PATELLA: ICD-10-CM

## 2018-11-02 PROCEDURE — 97530 THERAPEUTIC ACTIVITIES: CPT | Mod: GP | Performed by: PHYSICAL THERAPIST

## 2018-11-02 PROCEDURE — 97110 THERAPEUTIC EXERCISES: CPT | Mod: GP | Performed by: PHYSICAL THERAPIST

## 2018-11-04 DIAGNOSIS — M19.91 PRIMARY OSTEOARTHRITIS, UNSPECIFIED SITE: ICD-10-CM

## 2018-11-04 NOTE — TELEPHONE ENCOUNTER
"Requested Prescriptions   Pending Prescriptions Disp Refills     meloxicam (MOBIC) 15 MG tablet [Pharmacy Med Name: MELOXICAM 15MG      TAB] 90 tablet 1    Last Written Prescription Date:  5/9/2018  Last Fill Quantity: 90,  # refills: 1   Last office visit: 11/16/2017 with prescribing provider:  11/16/2017   Future Office Visit:   Next 5 appointments (look out 90 days)     Nov 06, 2018 11:30 AM CST   [facility]    [address] [address] [phone]                  Sig: TAKE 1 TABLET BY MOUTH ONCE DAILY    NSAID Medications Failed    11/4/2018  6:30 AM       Failed - Patient is age 6-64 years       Failed - Normal CBC on file in past 12 months    No lab results found.             Passed - Blood pressure under 140/90 in past 12 months    BP Readings from Last 3 Encounters:   10/06/18 132/70   11/16/17 126/68   05/22/17 132/70                Passed - Normal ALT on file in past 12 months    Recent Labs   Lab Test  11/09/17   1036   ALT  38            Passed - Normal AST on file in past 12 months    Recent Labs   Lab Test  11/09/17   1036   AST  25            Passed - Recent (12 mo) or future (30 days) visit within the authorizing provider's specialty    Patient had office visit in the last 12 months or has a visit in the next 30 days with authorizing provider or within the authorizing provider's specialty.  See \"Patient Info\" tab in inbasket, or \"Choose Columns\" in Meds & Orders section of the refill encounter.             Passed - No active pregnancy on record       Passed - Normal serum creatinine on file in past 12 months    Recent Labs   Lab Test  11/09/17   1036   CR  0.72            Passed - No positive pregnancy test in past 12 months          "

## 2018-11-06 DIAGNOSIS — E78.5 HYPERLIPIDEMIA LDL GOAL <130: ICD-10-CM

## 2018-11-06 DIAGNOSIS — I10 ESSENTIAL HYPERTENSION WITH GOAL BLOOD PRESSURE LESS THAN 140/90: ICD-10-CM

## 2018-11-06 LAB
ANION GAP SERPL CALCULATED.3IONS-SCNC: 8 MMOL/L (ref 3–14)
BUN SERPL-MCNC: 20 MG/DL (ref 7–30)
CALCIUM SERPL-MCNC: 9.6 MG/DL (ref 8.5–10.1)
CHLORIDE SERPL-SCNC: 103 MMOL/L (ref 94–109)
CHOLEST SERPL-MCNC: 302 MG/DL
CO2 SERPL-SCNC: 29 MMOL/L (ref 20–32)
CREAT SERPL-MCNC: 0.74 MG/DL (ref 0.52–1.04)
GFR SERPL CREATININE-BSD FRML MDRD: 78 ML/MIN/1.7M2
GLUCOSE SERPL-MCNC: 93 MG/DL (ref 70–99)
HDLC SERPL-MCNC: 60 MG/DL
LDLC SERPL CALC-MCNC: 199 MG/DL
NONHDLC SERPL-MCNC: 242 MG/DL
POTASSIUM SERPL-SCNC: 4.1 MMOL/L (ref 3.4–5.3)
SODIUM SERPL-SCNC: 140 MMOL/L (ref 133–144)
TRIGL SERPL-MCNC: 216 MG/DL

## 2018-11-06 PROCEDURE — 80048 BASIC METABOLIC PNL TOTAL CA: CPT | Performed by: INTERNAL MEDICINE

## 2018-11-06 PROCEDURE — 80061 LIPID PANEL: CPT | Performed by: INTERNAL MEDICINE

## 2018-11-06 PROCEDURE — 36415 COLL VENOUS BLD VENIPUNCTURE: CPT | Performed by: INTERNAL MEDICINE

## 2018-11-06 RX ORDER — MELOXICAM 15 MG/1
TABLET ORAL
Qty: 90 TABLET | Refills: 1 | Status: SHIPPED | OUTPATIENT
Start: 2018-11-06 | End: 2018-11-23

## 2018-11-06 NOTE — TELEPHONE ENCOUNTER
Routing refill request to provider for review/approval because:  Labs not current:  cbc  Age > 64

## 2018-11-12 ENCOUNTER — THERAPY VISIT (OUTPATIENT)
Dept: PHYSICAL THERAPY | Facility: CLINIC | Age: 68
End: 2018-11-12
Payer: COMMERCIAL

## 2018-11-12 DIAGNOSIS — M22.41 CHONDROMALACIA OF RIGHT PATELLA: ICD-10-CM

## 2018-11-12 PROCEDURE — 97110 THERAPEUTIC EXERCISES: CPT | Mod: GP | Performed by: PHYSICAL THERAPIST

## 2018-11-12 PROCEDURE — 97140 MANUAL THERAPY 1/> REGIONS: CPT | Mod: GP | Performed by: PHYSICAL THERAPIST

## 2018-11-12 ASSESSMENT — ACTIVITIES OF DAILY LIVING (ADL)
AS_A_RESULT_OF_YOUR_KNEE_INJURY,_HOW_WOULD_YOU_RATE_YOUR_CURRENT_LEVEL_OF_DAILY_ACTIVITY?: ABNORMAL
STIFFNESS: THE SYMPTOM AFFECTS MY ACTIVITY MODERATELY
KNEE_ACTIVITY_OF_DAILY_LIVING_SCORE: 70.77
GIVING WAY, BUCKLING OR SHIFTING OF KNEE: I HAVE THE SYMPTOM BUT IT DOES NOT AFFECT MY ACTIVITY
SWELLING: I DO NOT HAVE THE SYMPTOM
SQUAT: ACTIVITY IS SOMEWHAT DIFFICULT
KNEE_ACTIVITY_OF_DAILY_LIVING_SUM: 46
WALK: ACTIVITY IS MINIMALLY DIFFICULT
RISE FROM A CHAIR: ACTIVITY IS SOMEWHAT DIFFICULT
STAND: ACTIVITY IS MINIMALLY DIFFICULT
SIT WITH YOUR KNEE BENT: ACTIVITY IS NOT DIFFICULT
PAIN: THE SYMPTOM AFFECTS MY ACTIVITY MODERATELY
WEAKNESS: I HAVE THE SYMPTOM BUT IT DOES NOT AFFECT MY ACTIVITY
GO DOWN STAIRS: ACTIVITY IS SOMEWHAT DIFFICULT
RAW_SCORE: 49.54
GO UP STAIRS: ACTIVITY IS SOMEWHAT DIFFICULT
HOW_WOULD_YOU_RATE_THE_OVERALL_FUNCTION_OF_YOUR_KNEE_DURING_YOUR_USUAL_DAILY_ACTIVITIES?: ABNORMAL
LIMPING: I HAVE THE SYMPTOM BUT IT DOES NOT AFFECT MY ACTIVITY
KNEEL ON THE FRONT OF YOUR KNEE: NOT ANSWERED
HOW_WOULD_YOU_RATE_THE_CURRENT_FUNCTION_OF_YOUR_KNEE_DURING_YOUR_USUAL_DAILY_ACTIVITIES_ON_A_SCALE_FROM_0_TO_100_WITH_100_BEING_YOUR_LEVEL_OF_KNEE_FUNCTION_PRIOR_TO_YOUR_INJURY_AND_0_BEING_THE_INABILITY_TO_PERFORM_ANY_OF_YOUR_USUAL_DAILY_ACTIVITIES?: 60

## 2018-11-12 NOTE — PROGRESS NOTES
Subjective:  HPI                    Objective:  System    Physical Exam    General     ROS    Assessment/Plan:    PROGRESS  REPORT    Progress reporting period is from 10/12/2018 to 11/12/2018.       SUBJECTIVE  Subjective changes noted by patient:  Increased pain with stairs when trying to not compensate for the right lower extremity. Has only been doing back exercises to give her knees a break.       Current pain level is 0/10  .     Previous pain level was  3/10  .   Changes in function:  Yes (See Goal flowsheet attached for changes in current functional level)  Adverse reaction to treatment or activity: activity - worse with stairs.     OBJECTIVE  Changes noted in objective findings:  Yes, Patient has reduced hip MMT slightly with testing today. 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 This may have left her fatigued.          AROM: (* indicates patient's pain)   PROM:(over pressure)   R  L R L   Hyperextension 2 6     Extension 0 0     Flexion 137 137       Strength:   R L   HIP     Flex 4+ 4+   Abd 3- 3-   Ext 4+ 4   KNEE     Ext 5 5   Flex 5 5     Other:  - Pain with standing full knee extension and with standing knee flexion at ~30 degrees.   - Anterior knee pain with full extension no better--possibly worse-- following tibiomeniscal mobs.           ASSESSMENT/PLAN  Updated problem list and treatment plan: Diagnosis 1:  Right knee pain with comorbid hip weakness  Pain -  hot/cold therapy, manual therapy, splint/taping/bracing/orthotics, self management, education and home program  Decreased ROM/flexibility - manual therapy, therapeutic exercise, therapeutic activity and home program  Decreased strength - therapeutic exercise, therapeutic activities and home program  Decreased function - therapeutic activities and home program  Impaired posture - neuro re-education, therapeutic activities and home program    STG/LTGs have  been met or progress has been made towards goals:  Yes (See Goal flow sheet completed today.)  Assessment of Progress: The patient's condition is improving.  The patient has had set backs in their progress.  Self Management Plans:  Patient has been instructed in a home treatment program.  Patient  has been instructed in self management of symptoms.  I have re-evaluated this patient and find that the nature, scope, duration and intensity of the therapy is appropriate for the medical condition of the patient.  Yandy continues to require the following intervention to meet STG and LTG's:  PT    Recommendations:  This patient would benefit from continued therapy.     Frequency:  1 X week, once daily  Duration:  for 2 weeks tapering to 2 X a month over 4 weeks    Please refer to the daily flowsheet for treatment today, total treatment time and time spent performing 1:1 timed codes.

## 2018-11-19 ENCOUNTER — THERAPY VISIT (OUTPATIENT)
Dept: PHYSICAL THERAPY | Facility: CLINIC | Age: 68
End: 2018-11-19
Payer: COMMERCIAL

## 2018-11-19 DIAGNOSIS — M22.41 CHONDROMALACIA OF RIGHT PATELLA: ICD-10-CM

## 2018-11-19 PROCEDURE — 97112 NEUROMUSCULAR REEDUCATION: CPT | Mod: GP | Performed by: PHYSICAL THERAPIST

## 2018-11-19 PROCEDURE — 97110 THERAPEUTIC EXERCISES: CPT | Mod: GP | Performed by: PHYSICAL THERAPIST

## 2018-11-19 NOTE — PROGRESS NOTES
"    SUBJECTIVE:   Yandy Zamudio is a 68 year old female who presents for Preventive Visit.    Are you in the first 12 months of your Medicare Part B coverage?  No    Physical Health:  Answers for HPI/ROS submitted by the patient on 11/21/2018   Annual Exam:  In general, how would you rate your overall physical health?: excellent  Frequency of exercise:: 4-5 days/week  Do you usually eat at least 4 servings of fruit and vegetables a day, include whole grains & fiber, and avoid regularly eating high fat or \"junk\" foods? : Yes  Taking medications regularly:: Yes  Medication side effects:: None  Activities of Daily Living: no assistance needed  Home safety: no safety concerns identified  Hearing Impairment:: no hearing concerns  In the past 6 months, have you been bothered by leaking of urine?: No  In general, how would you rate your overall mental or emotional health?: excellent  Additional concerns today:: Yes  PHQ-2 Score: 0  Duration of exercise:: 15-30 minutes    Mental Health:    In general, how would you rate your overall mental or emotional health? excellent  PHQ-2 Score:      Additional concerns to address?  No    Fall risk:      Fallen 2 or more times in the past year?: No  Any fall with injury in the past year?: No        COGNITIVE SCREEN  1) Repeat 3 items (Leader, Season, Table)    2) Clock draw: NORMAL  3) 3 item recall: Recalls 3 objects  Results: NORMAL clock, 1-2 items recalled: COGNITIVE IMPAIRMENT LESS LIKELY    Mini-CogTM Copyright ROLAN Baeza. Licensed by the author for use in [facility]; reprinted with permission (princess@.[facility]). All rights reserved.        Lab Results   Component Value Date    GLC 93 11/06/2018     Lab Results   Component Value Date    A1C 5.4 11/09/2017     Lab Results   Component Value Date    CHOL 302 11/06/2018     Lab Results   Component Value Date     11/06/2018     Lab Results   Component Value Date    HDL 60 11/06/2018     Lab Results   Component Value " Date    TRIG 216 11/06/2018     Lab Results   Component Value Date    CR 0.74 11/06/2018     Lab Results   Component Value Date    ALT 38 11/09/2017     Lab Results   Component Value Date    AST 25 11/09/2017     No results found for: MICROL  Lab Results   Component Value Date    TSH 2.48 05/22/2017             Reviewed and updated as needed this visit by clinical staff         Reviewed and updated as needed this visit by Provider        Social History   Substance Use Topics     Smoking status: Never Smoker     Smokeless tobacco: Never Used     Alcohol use Yes      Comment: once in a while, small amount                             Do you feel safe in your environment - Yes    Do you have a Health Care Directive?: Yes: Patient states has Advance Directive and will bring in a copy to clinic.    Current providers sharing in care for this patient include:   Patient Care Team:  Omid Rivera MD as PCP - Mihai Koehler as Other (see comments) (Therapist)    The following health maintenance items are reviewed in Epic and correct as of today:  Health Maintenance   Topic Date Due     DEPRESSION ACTION PLAN Q1 YR  04/22/2016     ADVANCE DIRECTIVE PLANNING Q5 YRS  01/12/2017     TETANUS IMMUNIZATION (SYSTEM ASSIGNED)  12/11/2017     PHQ-9 Q6 MONTHS  05/16/2018     FALL RISK ASSESSMENT  11/16/2018     MAMMO SCREEN Q2 YR (SYSTEM ASSIGNED)  03/17/2019     BMP Q6 MOS  05/06/2019     LIPID MONITORING Q1 YEAR  11/06/2019     DEXA Q5 YR  06/01/2022     COLON CANCER SCREEN (SYSTEM ASSIGNED)  05/22/2024     PNEUMOCOCCAL  Completed     INFLUENZA VACCINE  Completed     HEPATITIS C SCREENING  Completed     Labs reviewed in EPIC        ROS:  CONSTITUTIONAL: NEGATIVE for fever, chills. Weight down 10 pounds with lower carb diet and exercise  INTEGUMENTARY/SKIN:  Left nose spot present for 6+ months that rarely bleeds and never heals  EYES: NEGATIVE for vision changes or irritation. Eye exam 1 month ago  ENT/MOUTH: NEGATIVE  "for ear, mouth and throat problems  RESP: NEGATIVE for significant cough or SOB  BREAST: NEGATIVE for masses, tenderness or discharge  CV: NEGATIVE for chest pain, palpitations or peripheral edema  GI: NEGATIVE for nausea, abdominal pain, heartburn, or change in bowel habits  : NEGATIVE for frequency, dysuria, or hematuria  MUSCULOSKELETAL: Has been doing physical therapy for her right knee which is quite improved.  Patient is developed some left shoulder pain in addition.  Patient states this has been present for a few months after doing a lot of remodeling work on a new property she and her  purchased.  Patient had been pulling a lot of nails out of floors using a hammer in her left arm.  Pain mostly with reaching behind the back and abducting the left shoulder.  No radiation of pain into the distal left upper extremity  NEURO: NEGATIVE for weakness, dizziness or paresthesias. Stable fibromyalgia pain control with meds  ENDOCRINE: NEGATIVE for temperature intolerance that are bothersome. Lipids elevated as above  HEME: NEGATIVE for bleeding problems  PSYCHIATRIC: NEGATIVE for changes in mood or affect with current medications. PHQ=2    OBJECTIVE:   /70  Pulse 88  Temp 98.2  F (36.8  C) (Oral)  Resp 16  Wt 178 lb (80.7 kg)  LMP 07/27/2009  SpO2 99%  BMI 27.88 kg/m2 Estimated body mass index is 27.88 kg/(m^2) as calculated from the following:    Height as of 10/6/18: 5' 7\" (1.702 m).    Weight as of this encounter: 178 lb (80.7 kg).  EXAM:   General appearance -   alert, no distress.  Pleasant affect  Skin - No rashes.  Left nasal bridge shows an approximate 6 mm erythematous flat skin lesion  Head - normocephalic, atraumatic  Eyes - CHRISTINA, EOMI, fundi exam with nondilated pupils negative.  Ears - External ears normal. Canals clear. TM's normal.  Nose/Sinuses - Nares normal. Septum midline. Mucosa normal. No drainage or sinus tenderness.  Oropharynx - No erythema, no adenopathy, no " exudates.  Neck - Supple without adenopathy or thyromegaly. No bruits.  Lungs - Clear to auscultation without wheezes/rhonchi.  Heart - Regular rate and rhythm without murmurs, clicks, or gallops.  Nodes - No supraclavicular, axillary, or inguinal adenopathy palpable.  Breasts - deferred  Abdomen - Abdomen soft, non-tender. BS normal. No masses or hepatosplenomegaly palpable. No bruits.  Extremities -No cyanosis, clubbing or edema.    Musculoskeletal - Spine ROM normal. Muscular strength intact.  Tenderness to abduction left shoulder beyond 80 degrees and to reaching behind the back.  No tenderness to internal/external rotation of the left upper extremity with elbow next to the body.  Minimal tenderness palpation left AC joint. DIP and PIP joints hands with osteoarthritis changes.  No warmth/erythema  Peripheral pulses - radial=4/4, femoral=4/4, posterior tibial=4/4, dorsalis pedis=4/4,  Neuro - Gait normal. Reflexes normal and symmetric. Sensation grossly WNL.  Mild tenderness to global fibromyalgia trigger points  Genital/Rectal - deferred           ASSESSMENT / PLAN:   1. Medicare annual wellness visit, initial  See below for HCM discussion    2. Major depressive disorder, recurrent, severe without psychotic features (H)  Controlled.  Continue current medication  - DULoxetine (CYMBALTA) 30 MG capsule; TAKE THREE CAPSULES BY MOUTH ONCE DAILY IN THE MORNING  Dispense: 270 capsule; Refill: 3    3. Hyperlipidemia LDL goal <130   CAD risk and LDL level at point where statin treatment recommended.  Previous intolerance of pravastatin.  Will treat with atorvastatin.  Future labs as ordered  - atorvastatin (LIPITOR) 20 MG tablet; Take 1 tablet (20 mg) by mouth daily  Dispense: 30 tablet; Refill: 11  - Lipid panel reflex to direct LDL Fasting; Future  - Hepatic panel; Future  - CK total; Future    4. Essential hypertension with goal blood pressure less than 140/90  Controlled.  Continue current medication  - amLODIPine  "(NORVASC) 5 MG tablet; Take 1 tablet (5 mg) by mouth daily  Dispense: 90 tablet; Refill: 3  - lisinopril-hydrochlorothiazide (PRINZIDE/ZESTORETIC) 10-12.5 MG per tablet; Take 1 tablet by mouth every morning  Dispense: 90 tablet; Refill: 1    5. Primary osteoarthritis, unspecified site  Overall controlled.  Renal function okay.  No GI intolerance issues.  Continue current medication  - meloxicam (MOBIC) 15 MG tablet; Take 1 tablet (15 mg) by mouth daily  Dispense: 90 tablet; Refill: 1    6. Skin disorder  With nonhealing left nasal lesion, concern for possible skin cancer.  Patient to see dermatology for biopsy  - DERMATOLOGY REFERRAL    7. Left shoulder pain, unspecified chronicity  Last x-ray from 2015 reviewed in the chart.  It appears more related to rotator cuff/tendon issues.  Will have patient see physical therapy.  If not improving with this, patient informed physician will refer to orthopedics for possible cortisone injection and/or additional imaging  - [facility]OPRACTIC REFERRAL; Future    8. Encounter for screening mammogram for breast cancer  - *MA Screening Digital Bilateral; Future      End of Life Planning:  Patient currently has an advanced directive: Yes.  Practitioner is supportive of decision.    COUNSELING:  Reviewed preventive health counseling, as reflected in patient instructions    BP Readings from Last 1 Encounters:   10/06/18 132/70     Estimated body mass index is 28.98 kg/(m^2) as calculated from the following:    Height as of 10/6/18: 5' 7\" (1.702 m).    Weight as of 10/6/18: 185 lb (83.9 kg).    BP Screening:   Last 3 BP Readings:    BP Readings from Last 3 Encounters:   11/23/18 130/70   10/06/18 132/70   11/16/17 126/68       The following was recommended to the patient:  Re-screen BP within a year and recommended lifestyle modifications       reports that she has never smoked. She has never used smokeless tobacco.      Appropriate preventive services were discussed with " this patient, including applicable screening as appropriate for cardiovascular disease, diabetes, osteopenia/osteoporosis, and glaucoma.  As appropriate for age/gender, discussed screening for colorectal cancer, prostate cancer, breast cancer, and cervical cancer. Checklist reviewing preventive services available has been given to the patient.    Reviewed patients plan of care and provided an AVS. The Basic Care Plan (routine screening as documented in Health Maintenance) for Yandy meets the Care Plan requirement. This Care Plan has been established and reviewed with the Patient.    Counseling Resources:  ATP IV Guidelines  Pooled Cohorts Equation Calculator  Breast Cancer Risk Calculator  FRAX Risk Assessment  ICSI Preventive Guidelines  Dietary Guidelines for Americans, 2010  [employer]'s MyPlate  ASA Prophylaxis  Lung CA Screening    PLAN:   Check with insurance or speak with your pharmacist re: Shingrix vaccine coverage for shingles prevention.  This is a 2 shot series done 2-6 months apart  Dermatology referral [facility] 3rd floor. Call for appt  Clinic will talk with [facility] re: tetanus vaccine history (patient states she believes this was done in the last year)  Mammogram    [facility] physical  therapy for left shoulder  Atorvastatin 20mg tab, 1 tab every  other day for 2 weeks. Then if feeling OK, then increase to 1 tab daily  Fasting lab in early January 2019  Continue other meds.  Prescriptions refilled      Omid Rivera MD  [facility]

## 2018-11-19 NOTE — PATIENT INSTRUCTIONS
Check with insurance or speak with your pharmacist re: Shingrix vaccine coverage for shingles prevention.  This is a 2 shot series done 2-6 months apart  Dermatology referral [facility] 3rd floor. Call for appt  Clinic will talk with [facility] re: tetanus vaccine history  Mammogram    [facility] physical  therapy for left shoulder  Atorvastatin 20mg tab, 1 tab every  other day for 2 weeks. Then if feeling OK, then increase to 1 tab daily  Fasting lab in early January 2019  Continue other meds.  Prescriptions refilled

## 2018-11-21 ASSESSMENT — ACTIVITIES OF DAILY LIVING (ADL): CURRENT_FUNCTION: NO ASSISTANCE NEEDED

## 2018-11-23 ENCOUNTER — OFFICE VISIT (OUTPATIENT)
Dept: INTERNAL MEDICINE | Facility: CLINIC | Age: 68
End: 2018-11-23
Payer: COMMERCIAL

## 2018-11-23 VITALS
TEMPERATURE: 98.2 F | SYSTOLIC BLOOD PRESSURE: 130 MMHG | BODY MASS INDEX: 27.88 KG/M2 | OXYGEN SATURATION: 99 % | DIASTOLIC BLOOD PRESSURE: 70 MMHG | WEIGHT: 178 LBS | HEART RATE: 88 BPM | RESPIRATION RATE: 16 BRPM

## 2018-11-23 DIAGNOSIS — M25.512 LEFT SHOULDER PAIN, UNSPECIFIED CHRONICITY: ICD-10-CM

## 2018-11-23 DIAGNOSIS — Z12.31 ENCOUNTER FOR SCREENING MAMMOGRAM FOR BREAST CANCER: ICD-10-CM

## 2018-11-23 DIAGNOSIS — M19.91 PRIMARY OSTEOARTHRITIS, UNSPECIFIED SITE: ICD-10-CM

## 2018-11-23 DIAGNOSIS — L98.9 SKIN DISORDER: ICD-10-CM

## 2018-11-23 DIAGNOSIS — E78.5 HYPERLIPIDEMIA LDL GOAL <130: ICD-10-CM

## 2018-11-23 DIAGNOSIS — I10 ESSENTIAL HYPERTENSION WITH GOAL BLOOD PRESSURE LESS THAN 140/90: ICD-10-CM

## 2018-11-23 DIAGNOSIS — Z00.00 MEDICARE ANNUAL WELLNESS VISIT, INITIAL: Primary | ICD-10-CM

## 2018-11-23 DIAGNOSIS — F33.2 MAJOR DEPRESSIVE DISORDER, RECURRENT, SEVERE WITHOUT PSYCHOTIC FEATURES (H): ICD-10-CM

## 2018-11-23 PROCEDURE — 99214 OFFICE O/P EST MOD 30 MIN: CPT | Mod: 25 | Performed by: INTERNAL MEDICINE

## 2018-11-23 PROCEDURE — G0438 PPPS, INITIAL VISIT: HCPCS | Performed by: INTERNAL MEDICINE

## 2018-11-23 RX ORDER — DULOXETIN HYDROCHLORIDE 30 MG/1
CAPSULE, DELAYED RELEASE ORAL
Qty: 270 CAPSULE | Refills: 3 | Status: SHIPPED | OUTPATIENT
Start: 2018-11-23 | End: 2019-12-13

## 2018-11-23 RX ORDER — ATORVASTATIN CALCIUM 20 MG/1
20 TABLET, FILM COATED ORAL DAILY
Qty: 30 TABLET | Refills: 11 | Status: SHIPPED | OUTPATIENT
Start: 2018-11-23 | End: 2019-11-11

## 2018-11-23 RX ORDER — AMLODIPINE BESYLATE 5 MG/1
5 TABLET ORAL DAILY
Qty: 90 TABLET | Refills: 3 | Status: SHIPPED | OUTPATIENT
Start: 2018-11-23 | End: 2019-11-24

## 2018-11-23 RX ORDER — LISINOPRIL/HYDROCHLOROTHIAZIDE 10-12.5 MG
1 TABLET ORAL EVERY MORNING
Qty: 90 TABLET | Refills: 1 | Status: SHIPPED | OUTPATIENT
Start: 2018-11-23 | End: 2019-05-31

## 2018-11-23 RX ORDER — MELOXICAM 15 MG/1
15 TABLET ORAL DAILY
Qty: 90 TABLET | Refills: 1 | Status: SHIPPED | OUTPATIENT
Start: 2018-11-23 | End: 2019-10-28

## 2018-11-23 ASSESSMENT — PATIENT HEALTH QUESTIONNAIRE - PHQ9: SUM OF ALL RESPONSES TO PHQ QUESTIONS 1-9: 2

## 2018-11-23 NOTE — MR AVS SNAPSHOT
After Visit Summary   11/23/2018    Yandy Zamudio    MRN: [id number]           Patient Information     Date Of Birth          1950        Visit Information        Provider Department      11/23/2018 11:30 AM Omid Rivera MD [facility]        Today's Diagnoses     Major depressive disorder, recurrent, severe without psychotic features (H)        Essential hypertension with goal blood pressure less than 140/90        Primary osteoarthritis, unspecified site          Care Instructions     [facility] physical  therapy for left shoulder  Atorvastatin 20mg tab, 1 tab every  other day for 2 weeks. Then if feeling OK, then increase to 1 tab daily  Fasting lab in early January 2019   Check with insurance or speak with your pharmacist re: Shingrix vaccine coverage for shingles prevention.  This is a 2 shot series done 2-6 months apart  Continue other meds.  Prescriptions refilled.    Dermatology referral [facility] 3rd floor. [facility] for appt  Clinic will talk with [facility] re: tetanus vaccine history  Mammogram                           Follow-ups after your visit        Your next 10 appointments already scheduled     Nov 26, 2018  9:20 AM CST   [facility] ([facility]  )    [address]  [address] [place] [address]   [phone]            Dec 03, 2018  9:20 AM CST   [facility] ([facility]  )    [address] [address]   [phone]              Who to contact     If you have questions or need follow up information about today's clinic visit or your schedule please contact [facility] directly at [phone].  Normal or non-critical lab and imaging results will be communicated to you by MyChart, letter or phone within 4 business days after the clinic has received the results. If you do not hear from us within 7 days, please contact the  clinic through [employer] or phone. If you have a critical or abnormal lab result, we will notify you by phone as soon as possible.  Submit refill requests through [employer] or call your pharmacy and they will forward the refill request to us. Please allow 3 business days for your refill to be completed.          Additional Information About Your Visit        [employer] Information     [employer] gives you secure access to your electronic health record. If you see a primary care provider, you can also send messages to your care team and make appointments. If you have questions, please call your primary care clinic.  If you do not have a primary care provider, please call [phone] and they will assist you.        Care EveryWhere ID     This is your Care EveryWhere ID. This could be used by other organizations to access your [facility] medical records  [id number]        Your Vitals Were     Pulse Temperature Respirations Last Period Pulse Oximetry BMI (Body Mass Index)    88 98.2  F (36.8  C) (Oral) 16 07/27/2009 99% 27.88 kg/m2       Blood Pressure from Last 3 Encounters:   11/23/18 130/70   10/06/18 132/70   11/16/17 126/68    Weight from Last 3 Encounters:   11/23/18 178 lb (80.7 kg)   10/06/18 185 lb (83.9 kg)   11/16/17 188 lb (85.3 kg)              Today, you had the following     No orders found for display         Today's Medication Changes          These changes are accurate as of 11/23/18 12:21 PM.  If you have any questions, ask your nurse or doctor.               These medicines have changed or have updated prescriptions.        Dose/Directions    meloxicam 15 MG tablet   Commonly known as:  MOBIC   This may have changed:  See the new instructions.   Used for:  Primary osteoarthritis, unspecified site   Changed by:  Omid Rivera MD        Dose:  15 mg   Take 1 tablet (15 mg) by mouth daily   Quantity:  90 tablet   Refills:  1            Where to get your medicines      These medications were sent to [id number] - [place], [place] - [address] [facility]  [address] [address]     Phone:  [phone]     amLODIPine 5 MG tablet    DULoxetine 30 MG capsule    lisinopril-hydrochlorothiazide 10-12.5 MG per tablet    meloxicam 15 MG tablet                Primary Care Provider Office Phone # Fax #    Omid Rivera [phone]       [address] [facility] [address]        Equal Access to Services     ROE GREEN : Hadii aad ku hadasho Soomaali, waaxda luqadaha, qaybta kaalmada adeegyada, waxay idiin hayaan adeeg kharash lamana . So [facility] [phone].    ATENCIÓN: Si habla español, tiene a lynn disposición servicios gratuitos de asistencia lingüística. [facility] [phone].    We comply with applicable federal civil rights laws and [place] laws. We do not discriminate on the basis of race, color, national origin, age, disability, sex, sexual orientation, or gender identity.            Thank you!     Thank you for choosing [facility]  for your care. Our goal is always to provide you with excellent care. Hearing back from our patients is one way we can continue to improve our services. Please take a few minutes to complete the written survey that you may receive in the mail after your visit with us. Thank you!             Your Updated Medication List - Protect others around you: Learn how to safely use, store and throw away your medicines at www.disposemymeds.org.          This list is accurate as of 11/23/18 12:21 PM.  Always use your most recent med list.                   Brand Name Dispense Instructions for use Diagnosis    amLODIPine 5 MG tablet    NORVASC    90 tablet    Take 1 tablet (5 mg) by mouth daily    Essential hypertension with goal blood pressure less than 140/90       BIOTIN 5000 PO           DULoxetine 30 MG capsule    CYMBALTA    270 capsule    TAKE THREE CAPSULES BY MOUTH ONCE DAILY IN THE MORNING    Major depressive disorder,  recurrent, severe without psychotic features (H)       lisinopril-hydrochlorothiazide 10-12.5 MG per tablet    PRINZIDE/ZESTORETIC    90 tablet    Take 1 tablet by mouth every morning    Essential hypertension with goal blood pressure less than 140/90       meloxicam 15 MG tablet    MOBIC    90 tablet    Take 1 tablet (15 mg) by mouth daily    Primary osteoarthritis, unspecified site       Multi-vitamin Tabs tablet      Take 1 tablet by mouth daily

## 2018-11-23 NOTE — LETTER
My Depression Action Plan  Name: Yandy Zamudio   Date of Birth 1950  Date: 11/23/2018    My doctor: Omid Rivera   My clinic: [address] [id number] [phone]          GREEN    ZONE   Good Control    What it looks like:     Things are going generally well. You have normal up s and down s. You may even feel depressed from time to time, but bad moods usually last less than a day.   What you need to do:  1. Continue to care for yourself (see self care plan)  2. Check your depression survival kit and update it as needed  3. Follow your physician s recommendations including any medication.  4. Do not stop taking medication unless you consult with your physician first.           YELLOW         ZONE Getting Worse    What it looks like:     Depression is starting to interfere with your life.     It may be hard to get out of bed; you may be starting to isolate yourself from others.    Symptoms of depression are starting to last most all day and this has happened for several days.     You may have suicidal thoughts but they are not constant.   What you need to do:     1. Call your care team, your response to treatment will improve if you keep your care team informed of your progress. Yellow periods are signs an adjustment may need to be made.     2. Continue your self-care, even if you have to fake it!    3. Talk to someone in your support network    4. Open up your depression survival kit           RED    ZONE Medical Alert - Get Help    What it looks like:     Depression is seriously interfering with your life.     You may experience these or other symptoms: You can t get out of bed most days, can t work or engage in other necessary activities, you have trouble taking care of basic hygiene, or basic responsibilities, thoughts of suicide or death that will not go away, self-injurious behavior.     What you need to do:  1. Call your care  team and request a same-day appointment. If they are not available (weekends or after hours) call your local crisis line, emergency room or 911.            Depression Self Care Plan / Survival Kit    Self-Care for Depression  Here s the deal. Your body and mind are really not as separate as most people think.  What you do and think affects how you feel and how you feel influences what you do and think. This means if you do things that people who feel good do, it will help you feel better.  Sometimes this is all it takes.  There is also a place for medication and therapy depending on how severe your depression is, so be sure to consult with your medical provider and/ or Behavioral Health Consultant if your symptoms are worsening or not improving.     In order to better manage my stress, I will:    Exercise  Get some form of exercise, every day. This will help reduce pain and release endorphins, the  feel good  chemicals in your brain. This is almost as good as taking antidepressants!  This is not the same as joining a gym and then never going! (they count on that by the way ) It can be as simple as just going for a walk or doing some gardening, anything that will get you moving.      Hygiene   Maintain good hygiene (Get out of bed in the morning, Make your bed, Brush your teeth, Take a shower, and Get dressed like you were going to work, even if you are unemployed).  If your clothes don't fit try to get ones that do.    Diet  I will strive to eat foods that are good for me, drink plenty of water, and avoid excessive sugar, caffeine, alcohol, and other mood-altering substances.  Some foods that are helpful in depression are: complex carbohydrates, B vitamins, flaxseed, fish or fish oil, fresh fruits and vegetables.    Psychotherapy  I agree to participate in Individual Therapy (if recommended).    Medication  If prescribed medications, I agree to take them.  Missing doses can result in serious side effects.  I  understand that drinking alcohol, or other illicit drug use, may cause potential side effects.  I will not stop my medication abruptly without first discussing it with my provider.    Staying Connected With Others  I will stay in touch with my friends, family members, and my primary care provider/team.    Use your imagination  Be creative.  We all have a creative side; it doesn t matter if it s oil painting, sand castles, or mud pies! This will also kick up the endorphins.    Witness Beauty  (AKA stop and smell the roses) Take a look outside, even in mid-winter. Notice colors, textures. Watch the squirrels and birds.     Service to others  Be of service to others.  There is always someone else in need.  By helping others we can  get out of ourselves  and remember the really important things.  This also provides opportunities for practicing all the other parts of the program.    Humor  Laugh and be silly!  Adjust your TV habits for less news and crime-drama and more comedy.    Control your stress  Try breathing deep, massage therapy, biofeedback, and meditation. Find time to relax each day.     My support system    Clinic Contact:  Phone number:    Contact 1:  Phone number:    Contact 2:  Phone number:    Islam/:  Phone number:    Therapist:  Phone number:    Local crisis center:    Phone number:    Other community support:  Phone number:

## 2018-11-25 ENCOUNTER — MYC MEDICAL ADVICE (OUTPATIENT)
Dept: INTERNAL MEDICINE | Facility: CLINIC | Age: 68
End: 2018-11-25

## 2018-11-25 DIAGNOSIS — M22.41 CHONDROMALACIA OF RIGHT PATELLA: Primary | ICD-10-CM

## 2018-11-26 ENCOUNTER — TELEPHONE (OUTPATIENT)
Dept: INTERNAL MEDICINE | Facility: CLINIC | Age: 68
End: 2018-11-26

## 2018-11-26 ENCOUNTER — THERAPY VISIT (OUTPATIENT)
Dept: PHYSICAL THERAPY | Facility: CLINIC | Age: 68
End: 2018-11-26
Payer: COMMERCIAL

## 2018-11-26 DIAGNOSIS — M25.512 LEFT SHOULDER PAIN: Primary | ICD-10-CM

## 2018-11-26 PROCEDURE — 97110 THERAPEUTIC EXERCISES: CPT | Mod: GP | Performed by: PHYSICAL THERAPIST

## 2018-11-26 PROCEDURE — 97162 PT EVAL MOD COMPLEX 30 MIN: CPT | Mod: GP | Performed by: PHYSICAL THERAPIST

## 2018-11-26 PROCEDURE — G8978 MOBILITY CURRENT STATUS: HCPCS | Mod: GP | Performed by: PHYSICAL THERAPIST

## 2018-11-26 PROCEDURE — G8979 MOBILITY GOAL STATUS: HCPCS | Mod: GP | Performed by: PHYSICAL THERAPIST

## 2018-11-26 NOTE — PROGRESS NOTES
[place] [facility] Initial Evaluation  Subjective:  HPI                    Objective:  System    Physical Exam    General     [facility]: Physical Therapy Initial Evaluation   Nov 26, 2018  Subjective:   Chief Complaint:    Pain: outside of the left shoulder. Has had some zings down to the forearm   Numbness/Tingling: None   Weakness: Limited by pain   Stiffness: in the left shoulder  New/Recurrent/Chronic: Recurrent  DOI/onset: Late spring/early summer of 2018   Referral Date: 11/23/2018 - Omid Rivera MD  Mechanism of onset: pulling nails with a hammer  PMH/surgical history/trauma:              - osteoarthritis (knee, left big toe, suspects others)             - osteoprosis             - overweight             - HTN             - Depression             - Fibromyalgia             - Menopausal             - Exostosis  General health as reported by patient: Excellent    Medications: HTN, Anti-depressants, anti-inflammatory,   Occupation: Artist/Teacher  Job duties: prolonged sitting, prolonged standing, repetitive tasks,   Previous Treatment (Effect): PT/injection for previous episode (helpful), ice (helped a little but not resolved),   Imaging: No imaging done  AM/PM: depends on movement/activity  Quality of Pain: Sharp with certain movements, otherwise mostly dull  Pain: 0/10 at present, 0/10 at best, 7/10 at worst  Worse: reaching, laying on the left side, dressing,   Better: stop doing what bothered it,   Progression of Symptoms since onset: better, but plateaued    Hx of Falls: slipped on Friday on the stairs  Sleeping: Not able to sleep on her left side for very long, maybe 1 hour if the arm is perfectly positioned   Current Functional Status: reaching - painful to reach out to the side or to reach backwards. Mild limitation with reaching overhead ;    Previous Functional Status: No restrictions previously  Hand Dominance: Right-handed  Current HEP/exercise regimen:  walking  Transportation to Therapy: Independent  Live with Others:  lives at home with her  Red Flags:   - Patient denies the following: Pain with Cough / Sneeze / Laughing ; Fever ; Weakness ; Numbness/Tingling ; Chest Pain ; Unexplained Weight Loss ;    Patient's Goal(s): Be able to put her coat on and not have sharp pains.        Objective:    Standing Posture: mild forward head posture    Cervical Screen: negative    Scapular Positioning: Depressed right scapula at rest    Shoulder: (* indicates patient's pain)   AROM R AROM L MMT R MMT L   Flex/  Elevation WNL Mild loss due to pain 5 4*   Abd 160 125* 5 5   IR   5 4+   ER 70 55* 4 4-*   IR/Ext T10 T12       Scapulothoracic Rhythm: early release of the left scapula    Special tests:   R L   Impingement     Neer's  (+)   Hawkin's-Terell  (+)   Biceps      Speed's  (-)     GH/Capsular Mobility  R L   Posterior glide     Inferior glide     Anterior glide         Other:   - ULTT Radial Nerve negative; patient reported a similar pain pattern while in standing which prompted the test.   - Tension increased in the left pec compared to the right.         Assessment/Plan:    Patient is a 68 year old female with left side shoulder complaints.    Patient has the following significant findings with corresponding treatment plan.                Diagnosis: Left shoulder pain  Pain -  hot/cold therapy, manual therapy, splint/taping/bracing/orthotics, self management, education and home program  Decreased ROM/flexibility - manual therapy, therapeutic exercise, therapeutic activity and home program  Decreased strength - therapeutic exercise, therapeutic activities and home program  Decreased function - therapeutic activities and home program  Impaired posture - neuro re-education, therapeutic activities and home program      Therapy Evaluation Codes:   1) History comprised of:   Personal factors that impact the plan of care:      None.    Comorbidity factors that impact  the plan of care are:      Fibromyalgia.     Medications impacting care: Anti-inflammatory.  2) Examination of Body Systems comprised of:   Body structures and functions that impact the plan of care:      Cervical spine, Shoulder and Thoracic Spine.   Activity limitations that impact the plan of care are:      Laying down and Reaching.  3) Clinical presentation characteristics are:   Unstable/Unpredictable.  4) Decision-Making    Moderate complexity using standardized patient assessment instrument and/or measureable assessment of functional outcome.  Cumulative Therapy Evaluation is: Moderate complexity.    Previous and current functional limitations:  (See Goal Flow Sheet for this information)    Short term and Long term goals: (See Goal Flow Sheet for this information)     Communication ability:  Patient appears to be able to clearly communicate and understand verbal and written communication and follow directions correctly.  Treatment Explanation - The following has been discussed with the patient:   RX ordered/plan of care  Anticipated outcomes  Possible risks and side effects  This patient would benefit from PT intervention to resume normal activities.   Rehab potential is good.    Frequency:  1 X week, once daily  Duration:  for 4 weeks tapering to 2 X a month over 4 weeks  Discharge Plan:  Achieve all LTG.  Independent in home treatment program.  Reach maximal therapeutic benefit.    Please refer to the daily flowsheet for treatment today, total treatment time and time spent performing 1:1 timed codes.

## 2018-11-26 NOTE — TELEPHONE ENCOUNTER
Per chart review current PT referral states Left Shoulder pain. Patient requesting to have right knee evaluated as well and will need new order for her right knee.    Order Td'up. Please review, edit/add, and sign if appropriate.     Nichole HENRY RN, BSN, PHN

## 2018-12-03 ENCOUNTER — THERAPY VISIT (OUTPATIENT)
Dept: PHYSICAL THERAPY | Facility: CLINIC | Age: 68
End: 2018-12-03
Payer: COMMERCIAL

## 2018-12-03 DIAGNOSIS — M22.41 CHONDROMALACIA OF RIGHT PATELLA: ICD-10-CM

## 2018-12-03 PROCEDURE — 97110 THERAPEUTIC EXERCISES: CPT | Mod: GP | Performed by: PHYSICAL THERAPIST

## 2018-12-03 PROCEDURE — 97112 NEUROMUSCULAR REEDUCATION: CPT | Mod: GP | Performed by: PHYSICAL THERAPIST

## 2018-12-03 PROCEDURE — 97530 THERAPEUTIC ACTIVITIES: CPT | Mod: GP | Performed by: PHYSICAL THERAPIST

## 2018-12-05 ENCOUNTER — THERAPY VISIT (OUTPATIENT)
Dept: PHYSICAL THERAPY | Facility: CLINIC | Age: 68
End: 2018-12-05
Payer: COMMERCIAL

## 2018-12-05 DIAGNOSIS — M25.512 LEFT SHOULDER PAIN: ICD-10-CM

## 2018-12-05 PROCEDURE — 97110 THERAPEUTIC EXERCISES: CPT | Mod: GP | Performed by: PHYSICAL THERAPIST

## 2018-12-05 PROCEDURE — 97112 NEUROMUSCULAR REEDUCATION: CPT | Mod: GP | Performed by: PHYSICAL THERAPIST

## 2018-12-11 ENCOUNTER — THERAPY VISIT (OUTPATIENT)
Dept: PHYSICAL THERAPY | Facility: CLINIC | Age: 68
End: 2018-12-11
Payer: COMMERCIAL

## 2018-12-11 DIAGNOSIS — M22.41 CHONDROMALACIA OF RIGHT PATELLA: ICD-10-CM

## 2018-12-11 DIAGNOSIS — M25.512 LEFT SHOULDER PAIN: ICD-10-CM

## 2018-12-11 PROCEDURE — 97530 THERAPEUTIC ACTIVITIES: CPT | Mod: GP | Performed by: PHYSICAL THERAPIST

## 2018-12-11 PROCEDURE — 97112 NEUROMUSCULAR REEDUCATION: CPT | Mod: GP | Performed by: PHYSICAL THERAPIST

## 2018-12-11 PROCEDURE — 97110 THERAPEUTIC EXERCISES: CPT | Mod: GP | Performed by: PHYSICAL THERAPIST

## 2018-12-17 ENCOUNTER — THERAPY VISIT (OUTPATIENT)
Dept: PHYSICAL THERAPY | Facility: CLINIC | Age: 68
End: 2018-12-17
Payer: COMMERCIAL

## 2018-12-17 DIAGNOSIS — M25.512 LEFT SHOULDER PAIN: ICD-10-CM

## 2018-12-17 DIAGNOSIS — M22.41 CHONDROMALACIA OF RIGHT PATELLA: ICD-10-CM

## 2018-12-17 PROCEDURE — 97530 THERAPEUTIC ACTIVITIES: CPT | Mod: GP | Performed by: PHYSICAL THERAPIST

## 2018-12-17 PROCEDURE — 2894A C THERAPEUTIC EXERCISES: CPT | Mod: GP | Performed by: PHYSICAL THERAPIST

## 2018-12-17 PROCEDURE — 97112 NEUROMUSCULAR REEDUCATION: CPT | Mod: GP | Performed by: PHYSICAL THERAPIST

## 2018-12-17 PROCEDURE — 97110 THERAPEUTIC EXERCISES: CPT | Mod: GP | Performed by: PHYSICAL THERAPIST

## 2018-12-18 ENCOUNTER — OFFICE VISIT (OUTPATIENT)
Dept: DERMATOLOGY | Facility: CLINIC | Age: 68
End: 2018-12-18
Payer: COMMERCIAL

## 2018-12-18 VITALS — OXYGEN SATURATION: 96 % | DIASTOLIC BLOOD PRESSURE: 81 MMHG | HEART RATE: 80 BPM | SYSTOLIC BLOOD PRESSURE: 131 MMHG

## 2018-12-18 DIAGNOSIS — D22.9 NEVUS: ICD-10-CM

## 2018-12-18 DIAGNOSIS — L82.1 SEBORRHEIC KERATOSIS: ICD-10-CM

## 2018-12-18 DIAGNOSIS — D48.5 NEOPLASM OF UNCERTAIN BEHAVIOR OF SKIN: Primary | ICD-10-CM

## 2018-12-18 DIAGNOSIS — L81.4 LENTIGO: ICD-10-CM

## 2018-12-18 DIAGNOSIS — D18.00 ANGIOMA: ICD-10-CM

## 2018-12-18 PROCEDURE — 11101 HC BIOPSY SKIN/SUBQ/MUC MEM, EACH ADDTL LESION: CPT | Performed by: PHYSICIAN ASSISTANT

## 2018-12-18 PROCEDURE — 11100 HC BIOPSY SKIN/SUBQ/MUC MEM, SINGLE LESION: CPT | Performed by: PHYSICIAN ASSISTANT

## 2018-12-18 PROCEDURE — 88305 TISSUE EXAM BY PATHOLOGIST: CPT | Mod: TC | Performed by: PHYSICIAN ASSISTANT

## 2018-12-18 PROCEDURE — 99204 OFFICE O/P NEW MOD 45 MIN: CPT | Mod: 25 | Performed by: PHYSICIAN ASSISTANT

## 2018-12-18 PROCEDURE — 88342 IMHCHEM/IMCYTCHM 1ST ANTB: CPT | Mod: TC | Performed by: PHYSICIAN ASSISTANT

## 2018-12-18 NOTE — PROGRESS NOTES
HPI:   Yandy Zamudio (Pat) is a 68 year old female who presents for Full skin cancer screening.  chief complaint  Last Skin Exam: n/a      1st Baseline: YES  Personal HX of Skin Cancer: none   Personal HX of Malignant Melanoma: none   Family HX of Skin Cancer / Malignant Melanoma: Father- SCC and BCC, brother- unknown  Personal HX of Atypical Moles: none  Risk factors: sun exposure, blistering sunburn in her youth   New / Changing lesions: yes, multiple spots all over body   Social History:  recommended her to me, he had 3 skin cancers removed. 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   On review of systems, there are no further skin complaints, patient is feeling otherwise well.  See patient intake sheet.  ROS of the following were done and are negative: Constitutional, Eyes, Ears, Nose,   Mouth, Throat, Cardiovascular, Respiratory, GI, Genitourinary, Musculoskeletal,   Psychiatric, Endocrine, Allergic/Immunologic.    This document serves as a record of the services and decisions personally performed and made by Denise Clayton, MS, PA-C. It was created on her behalf by Stacy Marrufo, a trained medical scribe. The creation of this document is based on the provider's statements to the medical scribe.  Stacy Marrufo 11:03 AM December 18, 2018    PHYSICAL EXAM:   /81   Pulse 80   LMP 07/27/2009   SpO2 96%   Breastfeeding? No   Skin exam performed as follows: Type 2 skin. Mood appropriate  Alert and Oriented X 3. Well developed, well nourished in no distress.  General appearance: Normal  Head including face: Normal  Eyes: conjunctiva and lids: Normal  Mouth: Lips, teeth, gums: Normal  Neck: Normal  Chest-breast/axillae: Normal  Back: Normal  Spleen and liver: Normal  Cardiovascular: Exam of peripheral vascular system by observation for swelling, varicosities, edema: Normal  Genitalia: groin, buttocks: Normal  Extremities:  digits/nails (clubbing): Normal  Eccrine and Apocrine glands: Normal  Right upper extremity: Normal  Left upper extremity: Normal  Right lower extremity: Normal  Left lower extremity: Normal  Skin: Scalp and body hair: See below    Pt deferred exam of breasts, groin, buttocks: No    Other physical findings:  1. Multiple pigmented macules on extremities and trunk  2. Multiple pigmented macules on face, trunk and extremities  3. Multiple vascular papules on trunk, arms and legs  4. Multiple scattered keratotic plaques  5. 5 mm irregular brown macule on right upper arm  6. 4 mm pink papule on left nasal bridge    7. 9 mm fleshy papule on left upper thigh      Except as noted above, no other signs of skin cancer or melanoma.     ASSESSMENT/PLAN:   Benign Full skin cancer screening today.     Patient with history of none  Advised on monthly self exams and 1 year  Patient Education: Appropriate brochures given.    Multiple benign appearing nevi on arms, legs and trunk. Discussed ABCDEs of melanoma and sunscreen.   Multiple lentigos on arms, legs and trunk. Advised benign, no treatment needed.  Multiple scattered angiomas. Advised benign, no treatment needed.   Seborrheic keratosis on arms, legs and trunk. Advised benign, no treatment needed.  R/o atypical nevus vs other on right upper arm. Photo taken and placed in chart. Shave bx in typical fashion .  Area cleaned with betadyne and anesthetized with 1% lidocaine with epi .  Dermablade used to remove the lesion and sent to pathology. Bleeding was cauterized. Pt tolerated procedure well.  R/o SCC on left nasal bridge. Photo taken and placed in chart. Shave bx in typical fashion .  Area cleaned with betadyne and anesthetized with 1% lidocaine with epi .  Dermablade used to remove the lesion and sent to pathology. Bleeding was cauterized. Pt tolerated procedure well.  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 Photo taken and placed in chart. Shave bx in  typical fashion .  Area cleaned with betadyne and anesthetized with 1% lidocaine with epi .  Dermablade used to remove the lesion and sent to pathology. Bleeding was cauterized. Pt tolerated procedure well.        Follow-up: pending path/yearly FSE/PRN sooner    1.) Patient was asked about new and changing moles. YES  2.) Patient received a complete physical skin examination: YES  3.) Patient was counseled to perform a monthly self skin examination: YES  Scribed By: Stacy Marrufo, Medical Scribe    The information in this document, created by the medical scribe for me, accurately reflects the services I personally performed and the decisions made by me. I have reviewed and approved this document for accuracy prior to leaving the patient care area.  December 18, 2018 11:16 AM    Denise Clayton MS, PA-C

## 2018-12-18 NOTE — LETTER
12/18/2018         RE: Yandy Zamudio  [address] [facility] [id number]        Dear Colleague,    Thank you for referring your patient, Yandy Zamudio, to the [facility]. Please see a copy of my visit note below.    HPI:   Yandy Zamudio (Pat) is a 68 year old female who presents for Full skin cancer screening.  chief complaint  Last Skin Exam: n/a      1st Baseline: YES  Personal HX of Skin Cancer: none   Personal HX of Malignant Melanoma: none   Family HX of Skin Cancer / Malignant Melanoma: Father- SCC and BCC, brother- unknown  Personal HX of Atypical Moles: none  Risk factors: sun exposure, blistering sunburn in her youth   New / Changing lesions: yes, multiple spots all over body   Social History:  recommended her to me, he had 3 skin cancers removed. 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   On review of systems, there are no further skin complaints, patient is feeling otherwise well.  See patient intake sheet.  ROS of the following were done and are negative: Constitutional, Eyes, Ears, Nose,   Mouth, Throat, Cardiovascular, Respiratory, GI, Genitourinary, Musculoskeletal,   Psychiatric, Endocrine, Allergic/Immunologic.    This document serves as a record of the services and decisions personally performed and made by Denise Clayton, MS, PA-C. It was created on her behalf by Stacy Marrufo, a trained medical scribe. The creation of this document is based on the provider's statements to the medical scribe.  Stacy Marrufo 11:03 AM December 18, 2018    PHYSICAL EXAM:   /81   Pulse 80   LMP 07/27/2009   SpO2 96%   Breastfeeding? No   Skin exam performed as follows: Type 2 skin. Mood appropriate  Alert and Oriented X 3. Well developed, well nourished in no distress.  General appearance: Normal  Head including face: Normal  Eyes: conjunctiva and lids: Normal  Mouth: Lips, teeth,  gums: Normal  Neck: Normal  Chest-breast/axillae: Normal  Back: Normal  Spleen and liver: Normal  Cardiovascular: Exam of peripheral vascular system by observation for swelling, varicosities, edema: Normal  Genitalia: groin, buttocks: Normal  Extremities: digits/nails (clubbing): Normal  Eccrine and Apocrine glands: Normal  Right upper extremity: Normal  Left upper extremity: Normal  Right lower extremity: Normal  Left lower extremity: Normal  Skin: Scalp and body hair: See below    Pt deferred exam of breasts, groin, buttocks: No    Other physical findings:  1. Multiple pigmented macules on extremities and trunk  2. Multiple pigmented macules on face, trunk and extremities  3. Multiple vascular papules on trunk, arms and legs  4. Multiple scattered keratotic plaques  5. 5 mm irregular brown macule on right upper arm  6. 4 mm pink papule on left nasal bridge    7. 9 mm fleshy papule on left upper thigh      Except as noted above, no other signs of skin cancer or melanoma.     ASSESSMENT/PLAN:   Benign Full skin cancer screening today.     Patient with history of none  Advised on monthly self exams and 1 year  Patient Education: Appropriate brochures given.    Multiple benign appearing nevi on arms, legs and trunk. Discussed ABCDEs of melanoma and sunscreen.   Multiple lentigos on arms, legs and trunk. Advised benign, no treatment needed.  Multiple scattered angiomas. Advised benign, no treatment needed.   Seborrheic keratosis on arms, legs and trunk. Advised benign, no treatment needed.  R/o atypical nevus vs other on right upper arm. Photo taken and placed in chart. Shave bx in typical fashion .  Area cleaned with betadyne and anesthetized with 1% lidocaine with epi .  Dermablade used to remove the lesion and sent to pathology. Bleeding was cauterized. Pt tolerated procedure well.  R/o SCC on left nasal bridge. Photo taken and placed in chart. Shave bx in typical fashion .  Area cleaned with betadyne and  anesthetized with 1% lidocaine with epi .  Dermablade used to remove the lesion and sent to pathology. Bleeding was cauterized. Pt tolerated procedure well.  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 Photo taken and placed in chart. Shave bx in typical fashion .  Area cleaned with betadyne and anesthetized with 1% lidocaine with epi .  Dermablade used to remove the lesion and sent to pathology. Bleeding was cauterized. Pt tolerated procedure well.        Follow-up: pending path/yearly FSE/PRN sooner    1.) Patient was asked about new and changing moles. YES  2.) Patient received a complete physical skin examination: YES  3.) Patient was counseled to perform a monthly self skin examination: YES  Scribed By: Stacy Marrufo, Medical Scribe    The information in this document, created by the medical scribe for me, accurately reflects the services I personally performed and the decisions made by me. I have reviewed and approved this document for accuracy prior to leaving the patient care area.  December 18, 2018 11:16 AM    Denise Clayton MS, PAMAGDALENO      Again, thank you for allowing me to participate in the care of your patient.        Sincerely,        Denise Clayton PA-C

## 2018-12-18 NOTE — PATIENT INSTRUCTIONS
Wound Care Instructions     FOR SUPERFICIAL WOUNDS     [facility] [phone]    [facility] [phone]                       AFTER 24 HOURS YOU SHOULD REMOVE THE BANDAGE AND BEGIN DAILY DRESSING CHANGES AS FOLLOWS:     1) Remove Dressing.     2) Clean and dry the area with tap water using a Q-tip or sterile gauze pad.     3) Apply Vaseline, Aquaphor, Polysporin ointment or Bacitracin ointment over entire wound.  Do NOT use Neosporin ointment.     4) Cover the wound with a band-aid, or a sterile non-stick gauze pad and micropore paper tape      REPEAT THESE INSTRUCTIONS AT LEAST ONCE A DAY UNTIL THE WOUND HAS COMPLETELY HEALED.    It is an old wives tale that a wound heals better when it is exposed to air and allowed to dry out. The wound will heal faster with a better cosmetic result if it is kept moist with ointment and covered with a bandage.    **Do not let the wound dry out.**      Supplies Needed:      *Cotton tipped applicators (Q-tips)    *Polysporin Ointment or Bacitracin Ointment (NOT NEOSPORIN)    *Band-aids or non-stick gauze pads and micropore paper tape.      PATIENT INFORMATION:    During the healing process you will notice a number of changes. All wounds develop a small halo of redness surrounding the wound.  This means healing is occurring. Severe itching with extensive redness usually indicates sensitivity to the ointment or bandage tape used to dress the wound.  You should call our office if this develops.      Swelling  and/or discoloration around your surgical site is common, particularly when performed around the eye.    All wounds normally drain.  The larger the wound the more drainage there will be.  After 7-10 days, you will notice the wound beginning to shrink and new skin will begin to grow.  The wound is healed when you can see skin has formed over the entire area.  A healed wound has a healthy, shiny look to the surface and is red to dark pink in color  to normalize.  Wounds may take approximately 4-6 weeks to heal.  Larger wounds may take 6-8 weeks.  After the wound is healed you may discontinue dressing changes.    You may experience a sensation of tightness as your wound heals. This is normal and will gradually subside.    Your healed wound may be sensitive to temperature changes. This sensitivity improves with time, but if you re having a lot of discomfort, try to avoid temperature extremes.    Patients frequently experience itching after their wound appears to have healed because of the continue healing under the skin.  Plain Vaseline will help relieve the itching.        POSSIBLE COMPLICATIONS    BLEEDIN. Leave the bandage in place.  2. Use tightly rolled up gauze or a cloth to apply direct pressure over the bandage for 30  minutes.  3. Reapply pressure for an additional 30 minutes if necessary  4. Use additional gauze and tape to maintain pressure once the bleeding has stopped.

## 2018-12-24 ENCOUNTER — THERAPY VISIT (OUTPATIENT)
Dept: PHYSICAL THERAPY | Facility: CLINIC | Age: 68
End: 2018-12-24
Payer: COMMERCIAL

## 2018-12-24 DIAGNOSIS — M25.512 LEFT SHOULDER PAIN: ICD-10-CM

## 2018-12-24 PROCEDURE — 97110 THERAPEUTIC EXERCISES: CPT | Mod: GP | Performed by: PHYSICAL THERAPIST

## 2018-12-24 PROCEDURE — 97140 MANUAL THERAPY 1/> REGIONS: CPT | Mod: GP | Performed by: PHYSICAL THERAPIST

## 2018-12-24 PROCEDURE — 97112 NEUROMUSCULAR REEDUCATION: CPT | Mod: GP | Performed by: PHYSICAL THERAPIST

## 2018-12-31 ENCOUNTER — THERAPY VISIT (OUTPATIENT)
Dept: PHYSICAL THERAPY | Facility: CLINIC | Age: 68
End: 2018-12-31
Payer: COMMERCIAL

## 2018-12-31 ENCOUNTER — TELEPHONE (OUTPATIENT)
Dept: DERMATOLOGY | Facility: CLINIC | Age: 68
End: 2018-12-31

## 2018-12-31 DIAGNOSIS — M25.512 LEFT SHOULDER PAIN: ICD-10-CM

## 2018-12-31 DIAGNOSIS — M22.41 CHONDROMALACIA OF RIGHT PATELLA: ICD-10-CM

## 2018-12-31 LAB — COPATH REPORT: NORMAL

## 2018-12-31 PROCEDURE — 97110 THERAPEUTIC EXERCISES: CPT | Mod: GP | Performed by: PHYSICAL THERAPIST

## 2018-12-31 PROCEDURE — 97530 THERAPEUTIC ACTIVITIES: CPT | Mod: GP | Performed by: PHYSICAL THERAPIST

## 2018-12-31 PROCEDURE — 2894A C THERAPEUTIC EXERCISES: CPT | Mod: GP | Performed by: PHYSICAL THERAPIST

## 2018-12-31 PROCEDURE — 97112 NEUROMUSCULAR REEDUCATION: CPT | Mod: GP | Performed by: PHYSICAL THERAPIST

## 2018-12-31 NOTE — TELEPHONE ENCOUNTER
Called and spoke to patient. Educated patient on biopsy results- AK x1, benign x1, severely atypical nevus x1. Educated patient on severely atypical nevi, Mohs, scheduled Mohs, and letter/packet sent. Patient voiced understanding.    JEFF Bess-BSN  [facility]  [phone]

## 2018-12-31 NOTE — TELEPHONE ENCOUNTER
Notes recorded by Denise Clayton PA-C on 12/31/2018 at 1:48 PM CST  Right upper arm severely atypical nevus please schedule for reexcision within the next 3 weeks.  Left nasal bridge AK - cryo at excision appt.   Left upper thigh benign nevus no further treatment.

## 2018-12-31 NOTE — LETTER
[facility]  [address]  [address]  [phone]    12/31/2018       Yandy Zamudio  [address] [facility] [address]      Dear Yandy:    You are scheduled for Mohs Surgery on: 1/17/19 @7:00am.    Please check in at [facility], [address].     You don't need to arrive more than 5-10 minutes prior to your appointment time.     Be sure to eat a good breakfast and bathe and wash your hair prior to surgery.     If you are taking any anti-coagulants that are prescribed by your Doctor (such as Coumadin/Warfarin, Plavix, Aspirin, Ibuprofen), please continue taking them.     However, if you are taking anti-coagulants over the counter without a Doctor's order for a medical condition, please discontinue them 10 days prior to surgery.     Please wear loose comfortable clothing as it could possibly be 4-6 hours until your surgery is completed depending upon how many layers of tissue need to be removed.      Thank you,    DAVID Arellano MD

## 2019-01-02 NOTE — PROGRESS NOTES
Shoulder: (* indicates patient's pain)   AROM R AROM L MMT R MMT L   Flex/  Elevation 156 131 5 5*   Abd 166 126* 5 5*   IR   5 5   ER 93 70 5 4+*   IR/Ext T11 L4*       Other:   - Pain into ER in supine, better with AP pressure.       Patient is improving, but slowly. Recommend continuing with current plan to allow more time for exercises to take additional effect.

## 2019-01-02 NOTE — PROGRESS NOTES
Subjective:  HPI                    Objective:  System    Physical Exam    General     ROS    Assessment/Plan:    PROGRESS  REPORT    Progress reporting period is from 11/12/2018 to 12/31/2018.       SUBJECTIVE  Subjective changes noted by patient:  Getting better slowly, but still has pain in the right knee. .       Initial Pain level: 7/10.   Changes in function:  Minimal change since last progress note  Adverse reaction to treatment or activity: Activity - increased pain with general weightbearing activity, worse with heavier tasks such as stairs.     OBJECTIVE  Changes noted in objective findings:  Yes, improvement in hip extension strength, but not in hip abduction strength.     HIP: (* indicates patient's pain)   MMT R MMT L   abduction 3- 3-   Extension 5 5             ASSESSMENT/PLAN  Updated problem list and treatment plan:   Diagnosis 1:  Right knee pain with comorbid hip weakness  Pain -  hot/cold therapy, manual therapy, splint/taping/bracing/orthotics, self management, education and home program  Decreased ROM/flexibility - manual therapy, therapeutic exercise, therapeutic activity and home program  Decreased strength - therapeutic exercise, therapeutic activities and home program  Decreased function - therapeutic activities and home program  Impaired posture - neuro re-education, therapeutic activities and home program    STG/LTGs have been met or progress has been made towards goals: Yes, some improvement in strength, but without additional meaningful improvement in function at this time.   Assessment of Progress: The patient's condition has potential to improve.  Patient is meeting short term goals and is progressing towards long term goals.  Self Management Plans:  Patient has been instructed in a home treatment program.  Patient  has been instructed in self management of symptoms.  I have re-evaluated this patient and find that the nature, scope, duration and intensity of the therapy is appropriate  for the medical condition of the patient.  Yandy continues to require the following intervention to meet STG and LTG's:  PT    Recommendations:  This patient would benefit from continued therapy.     Frequency:  2 X a month, once daily  Duration:  for 1 months    Please refer to the daily flowsheet for treatment today, total treatment time and time spent performing 1:1 timed codes.

## 2019-01-14 ENCOUNTER — THERAPY VISIT (OUTPATIENT)
Dept: PHYSICAL THERAPY | Facility: CLINIC | Age: 69
End: 2019-01-14
Payer: COMMERCIAL

## 2019-01-14 DIAGNOSIS — M25.512 LEFT SHOULDER PAIN: ICD-10-CM

## 2019-01-14 DIAGNOSIS — M22.41 CHONDROMALACIA OF RIGHT PATELLA: ICD-10-CM

## 2019-01-14 PROCEDURE — 97530 THERAPEUTIC ACTIVITIES: CPT | Mod: GP | Performed by: PHYSICAL THERAPIST

## 2019-01-14 PROCEDURE — 97110 THERAPEUTIC EXERCISES: CPT | Mod: GP | Performed by: PHYSICAL THERAPIST

## 2019-01-14 NOTE — PROGRESS NOTES
PROGRESS  REPORT    Progress reporting period is from Nov 26, 2018 to Jan 14, 2019.       SUBJECTIVE  Subjective changes noted by patient: Patient reports that things have been getting better. She does not have pain every day. She tries to lay on that side which is still somewhat uncomfortable. The big change with that is that the pain occurs right away.   Current Pain level: 0/10.     Initial Pain level: 7/10.   Changes in function:  Yes (See Goal flowsheet attached for changes in current functional level)  Adverse reaction to treatment or activity: None    OBJECTIVE  Changes noted in objective findings:  Yes, Patient demonstrates improvements in pain-free strength. Pain-free ROM is about the same, but pain improves with repeated horizontal adduction.     Shoulder: (* indicates patient's pain)   AROM R AROM L MMT R MMT L   Flex/  Elevation  144  5*   Abd  124*  5*   ER    5   IR/Ext  L2  5     Other:   - Pain improved with repeated horizontal adduction, as well as with posterior capsule/posterior rotator cuff stretch (sleeper stretch)          ASSESSMENT/PLAN  Updated problem list and treatment plan:   Diagnosis: Left shoulder pain  Pain -  hot/cold therapy, manual therapy, splint/taping/bracing/orthotics, self management, education and home program  Decreased ROM/flexibility - manual therapy, therapeutic exercise, therapeutic activity and home program  Decreased strength - therapeutic exercise, therapeutic activities and home program  Decreased function - therapeutic activities and home program  Impaired posture - neuro re-education, therapeutic activities and home program    STG/LTGs have been met or progress has been made towards goals:  Yes (See Goal flow sheet completed today.)  Assessment of Progress: The patient's condition is improving.  The patient's condition has potential to improve.  Patient is meeting short term goals and is progressing towards long term goals.  Self Management Plans:  Patient has been  instructed in a home treatment program.  Patient  has been instructed in self management of symptoms.  I have re-evaluated this patient and find that the nature, scope, duration and intensity of the therapy is appropriate for the medical condition of the patient.  Yandy continues to require the following intervention to meet STG and LTG's:  PT    Recommendations:  This patient would benefit from continued therapy.     Frequency:  2 X a month, once daily  Duration:  for 1 months    Please refer to the daily flowsheet for treatment today, total treatment time and time spent performing 1:1 timed codes.

## 2019-01-15 NOTE — PROGRESS NOTES
Surgical Office Location:  [facility]  [address] [place] [id number]

## 2019-01-17 ENCOUNTER — OFFICE VISIT (OUTPATIENT)
Dept: DERMATOLOGY | Facility: CLINIC | Age: 69
End: 2019-01-17
Payer: COMMERCIAL

## 2019-01-17 VITALS — OXYGEN SATURATION: 100 % | HEART RATE: 78 BPM | DIASTOLIC BLOOD PRESSURE: 76 MMHG | SYSTOLIC BLOOD PRESSURE: 135 MMHG

## 2019-01-17 DIAGNOSIS — D48.5 NEOPLASM OF UNCERTAIN BEHAVIOR OF SKIN: Primary | ICD-10-CM

## 2019-01-17 PROCEDURE — 88331 PATH CONSLTJ SURG 1 BLK 1SPC: CPT | Performed by: DERMATOLOGY

## 2019-01-17 PROCEDURE — 11602 EXC TR-EXT MAL+MARG 1.1-2 CM: CPT | Mod: 51 | Performed by: DERMATOLOGY

## 2019-01-17 PROCEDURE — 13121 CMPLX RPR S/A/L 2.6-7.5 CM: CPT | Performed by: DERMATOLOGY

## 2019-01-17 PROCEDURE — 88332 PATH CONSLTJ SURG EA ADD BLK: CPT | Performed by: DERMATOLOGY

## 2019-01-17 PROCEDURE — 88341 IMHCHEM/IMCYTCHM EA ADD ANTB: CPT | Performed by: DERMATOLOGY

## 2019-01-17 PROCEDURE — 88342 IMHCHEM/IMCYTCHM 1ST ANTB: CPT | Performed by: DERMATOLOGY

## 2019-01-17 NOTE — PROGRESS NOTES
Yandy Zamudio is a 68 year old year old female patient here today for evaluation and managment of severely atypical nevus on right arm.  Patient reports the following modifying factors none.  Associated symptoms: none.  Patient has no other skin complaints today.  Remainder of the HPI, Meds, PMH, Allergies, FH, and SH was reviewed in chart.      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 Father      Cancer Father         bladder  at age 88     Skin Cancer Father      Respiratory Mother         COPD     Skin Cancer Brother      Cerebrovascular Disease Brother        Social History     Socioeconomic History     Marital status:      Spouse name: Not on file     Number of children: Not on file     Years of education: Not on file     Highest education level: Not on file   Social Needs     Financial resource strain: Not on file     Food insecurity - worry: Not on file     Food insecurity - inability: Not on file     Transportation needs - medical: Not on file     Transportation needs - non-medical: Not on file   Occupational History     Not on file   Tobacco Use     Smoking status: Never Smoker     Smokeless tobacco: Never Used   Substance and Sexual Activity     Alcohol use: Yes     Comment: once in a while, small amount      Drug use: No     Sexual activity: No     Partners: Male     Birth control/protection: Surgical     Comment: tubal ligation   Other Topics Concern     Parent/sibling w/ CABG, MI or angioplasty before 65F 55M? Not Asked   Social History Narrative     Not on file       Outpatient Encounter Medications as of 1/17/2019   Medication Sig Dispense Refill     amLODIPine (NORVASC) 5 MG tablet Take 1 tablet (5 mg) by mouth daily 90 tablet 3     atorvastatin (LIPITOR) 20 MG tablet Take 1 tablet (20 mg) by mouth daily 30 tablet 11     Biotin 5000 MCG CAPS Take 5,000 mcg by mouth daily       DULoxetine (CYMBALTA) 30 MG capsule TAKE THREE CAPSULES BY MOUTH ONCE DAILY IN THE MORNING 270 capsule 3     lisinopril-hydrochlorothiazide (PRINZIDE/ZESTORETIC) 10-12.5 MG per tablet Take 1 tablet by mouth every morning 90 tablet 1     meloxicam (MOBIC) 15 MG tablet Take 1 tablet (15 mg) by mouth daily 90 tablet 1     multivitamin, therapeutic with minerals (MULTI-VITAMIN) TABS tablet Take 1 tablet by mouth daily       Facility-Administered Encounter Medications as of 1/17/2019   Medication Dose Route Frequency Provider Last Rate Last Dose     methylPREDNISolone acetate (DEPO-MEDROL) injection 40 mg  40 mg   Dylan Almazan DO   40 mg at 10/06/18 1008             Review Of Systems  Skin: As above  Eyes: negative  Ears/Nose/Throat: negative  Respiratory: No shortness of breath, dyspnea on exertion, cough, or hemoptysis  Cardiovascular: negative  Gastrointestinal: negative  Genitourinary: negative  Musculoskeletal: negative  Neurologic: negative  Psychiatric: negative  Hematologic/Lymphatic/Immunologic: negative  Endocrine: negative      O:   NAD, WDWN, Alert & Oriented, Mood & Affect wnl, Vitals stable   Here today alone   /76   Pulse 78   LMP 07/27/2009   SpO2 100%   Breastfeeding? No    General appearance normal   Vitals stable   Alert, oriented and in no acute distress     R arm 1cm red plaque       Eyes:  Conjunctivae/lids:Normal     ENT: Lips, buccal mucosa, tongue: normal    MSK:Normal    Cardiovascular: peripheral edema none    Pulm: Breathing Normal    Lymph Nodes: No Head and Neck Lymphadenopathy     Neuro/Psych: Orientation:Normal; Mood/Affect:Normal      MICRO:   R arm:Unremarkable epidermis with parallel bundles of cellular collagen within the superficial dermis.  No concerning areas for malignancy.     A/P:  1. R arm severely atypical nevus  Pathophysiology discussed with pateint and information provided   EXCISION OF AN, Margins confirmed with FROZEN SECTIONS and MART1 stain, AND COMPLEX: After thorough discussion of PGACAC, consent obtained, anesthesia and prep, the margins of the lesion were identified and an elliptical incision was made encompassing the lesion with 4mm margin. The incisions were made through the skin and down to and including the subcutaneous tissue. The lesion was removed en bloc and processed into 3 tissue blocks and submitted for frozen section pathologic review. Clear margins obtained (1.5cm).  MART1 stain performed on two sections.   The wound edges were widely undermined until adequate tissue mobility was obtained. hemostasis was achieved. The wound edges were then closed in a layered fashion, being careful not to leave any dead space. Postoperative length was 4.1 cm.   EBL minimal; complications none; wound care routine. The patient was from the clinic in good condition and will return in one week for wound check.     BENIGN LESIONS DISCUSSED WITH PATIENT:  I discussed the specifics of tumor, prognosis, and genetics of benign lesions.  I explained that treatment of these lesions would be purely cosmetic and not medically neccessary.  I discussed with patient different removal options including excision, cautery and /or laser.      Nature and genetics of benign skin lesions dicussed with patient.  Signs and Symptoms of skin cancer discussed with patient.  ABCDEs of melanoma  reviewed with patient.  Patient encouraged to perform monthly skin exams.  UV precautions reviewed with patient.  Patient to follow up with Primary Care provider regarding elevated blood pressure.  Skin care regimen reviewed with patient: Eliminate harsh soaps, i.e. Dial, zest, irsih spring; Mild soaps such as Cetaphil or Dove sensitive skin, avoid hot or cold showers, aggressive use of emollients including vanicream, cetaphil or cerave discussed with patient.    Risks of non-melanoma skin cancer discussed with patient   Return to clinic 6 months

## 2019-01-17 NOTE — LETTER
2019         RE: Yandy Zamudio  [address] [facility] [address]        Dear Colleague,    Thank you for referring your patient, Yandy Zamudio, to the [facility]. Please see a copy of my visit note below.    Surgical Office Location:  [facility]  [address] [place] [id number]      Yandy Zamudio is a 68 year old year old female patient here today for evaluation and managment of severely atypical nevus on right arm.  Patient reports the following modifying factors none.  Associated symptoms: none.  Patient has no other skin complaints today.  Remainder of the HPI, Meds, PMH, Allergies, FH, and SH was reviewed in chart.      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 Father      Cancer Father         bladder  at age 88     Skin Cancer Father      Respiratory Mother         COPD     Skin Cancer Brother      Cerebrovascular Disease Brother        Social History     Socioeconomic History     Marital status:      Spouse name: Not on file     Number of children: Not on file     Years of education: Not on file     Highest education level: Not on file   Social Needs     Financial resource strain: Not on file     Food insecurity - worry: Not on file     Food  insecurity - inability: Not on file     Transportation needs - medical: Not on file     Transportation needs - non-medical: Not on file   Occupational History     Not on file   Tobacco Use     Smoking status: Never Smoker     Smokeless tobacco: Never Used   Substance and Sexual Activity     Alcohol use: Yes     Comment: once in a while, small amount     Drug use: No     Sexual activity: No     Partners: Male     Birth control/protection: Surgical     Comment: tubal ligation   Other Topics Concern     Parent/sibling w/ CABG, MI or angioplasty before 65F 55M? Not Asked   Social History Narrative     Not on file       Outpatient Encounter Medications as of 1/17/2019   Medication Sig Dispense Refill     amLODIPine (NORVASC) 5 MG tablet Take 1 tablet (5 mg) by mouth daily 90 tablet 3     atorvastatin (LIPITOR) 20 MG tablet Take 1 tablet (20 mg) by mouth daily 30 tablet 11     Biotin 5000 MCG CAPS Take 5,000 mcg by mouth daily       DULoxetine (CYMBALTA) 30 MG capsule TAKE THREE CAPSULES BY MOUTH ONCE DAILY IN THE MORNING 270 capsule 3     lisinopril-hydrochlorothiazide (PRINZIDE/ZESTORETIC) 10-12.5 MG per tablet Take 1 tablet by mouth every morning 90 tablet 1     meloxicam (MOBIC) 15 MG tablet Take 1 tablet (15 mg) by mouth daily 90 tablet 1     multivitamin, therapeutic with minerals (MULTI-VITAMIN) TABS tablet Take 1 tablet by mouth daily       Facility-Administered Encounter Medications as of 1/17/2019   Medication Dose Route Frequency Provider Last Rate Last Dose     methylPREDNISolone acetate (DEPO-MEDROL) injection 40 mg  40 mg   Dylan Almazan DO   40 mg at 10/06/18 1008             Review Of Systems  Skin: As above  Eyes: negative  Ears/Nose/Throat: negative  Respiratory: No shortness of breath, dyspnea on exertion, cough, or hemoptysis  Cardiovascular: negative  Gastrointestinal: negative  Genitourinary: negative  Musculoskeletal: negative  Neurologic: negative  Psychiatric:  negative  Hematologic/Lymphatic/Immunologic: negative  Endocrine: negative      O:   NAD, WDWN, Alert & Oriented, Mood & Affect wnl, Vitals stable   Here today alone   /76   Pulse 78   LMP 07/27/2009   SpO2 100%   Breastfeeding? No    General appearance normal   Vitals stable   Alert, oriented and in no acute distress     R arm 1cm red plaque       Eyes: Conjunctivae/lids:Normal     ENT: Lips, buccal mucosa, tongue: normal    MSK:Normal    Cardiovascular: peripheral edema none    Pulm: Breathing Normal    Lymph Nodes: No Head and Neck Lymphadenopathy     Neuro/Psych: Orientation:Normal; Mood/Affect:Normal      MICRO:   R arm:Unremarkable epidermis with parallel bundles of cellular collagen within the superficial dermis.  No concerning areas for malignancy.     A/P:  1. R arm severely atypical nevus  Pathophysiology discussed with pateint and information provided   EXCISION OF AN, Margins confirmed with FROZEN SECTIONS and MART1 stain, AND COMPLEX: After thorough discussion of PGACAC, consent obtained, anesthesia and prep, the margins of the lesion were identified and an elliptical incision was made encompassing the lesion with 4mm margin. The incisions were made through the skin and down to and including the subcutaneous tissue. The lesion was removed en bloc and processed into 3 tissue blocks and submitted for frozen section pathologic review. Clear margins obtained (1.5cm).  MART1 stain performed on two sections.   The wound edges were widely undermined until adequate tissue mobility was obtained. hemostasis was achieved. The wound edges were then closed in a layered fashion, being careful not to leave any dead space. Postoperative length was 4.1 cm.   EBL minimal; complications none; wound care routine. The patient was from the clinic in good condition and will return in one week for wound check.     BENIGN LESIONS DISCUSSED WITH PATIENT:  I discussed the specifics of tumor, prognosis, and genetics of  benign lesions.  I explained that treatment of these lesions would be purely cosmetic and not medically neccessary.  I discussed with patient different removal options including excision, cautery and /or laser.      Nature and genetics of benign skin lesions dicussed with patient.  Signs and Symptoms of skin cancer discussed with patient.  ABCDEs of melanoma reviewed with patient.  Patient encouraged to perform monthly skin exams.  UV precautions reviewed with patient.  Patient to follow up with Primary Care provider regarding elevated blood pressure.  Skin care regimen reviewed with patient: Eliminate harsh soaps, i.e. Dial, zest, irsih spring; Mild soaps such as Cetaphil or Dove sensitive skin, avoid hot or cold showers, aggressive use of emollients including vanicream, cetaphil or cerave discussed with patient.    Risks of non-melanoma skin cancer discussed with patient   Return to clinic 6 months      Again, thank you for allowing me to participate in the care of your patient.        Sincerely,        Bridger Arellano MD

## 2019-01-17 NOTE — PATIENT INSTRUCTIONS
WOUND CARE INSTRUCTIONS   FOR CRYOSURGERY   This area treated with liquid nitrogen should form a blister (areas treated may or may not blister-skin may just turn dark and slough off). You do not need to bandage the area unless a blister forms and breaks (which may be a few days). When the blister breaks, begin daily dressing changes as follows:  1) Clean and dry the area with tap water using clean Q-tip or sterile gauze pad.   2) Apply Polysporin ointment or Bacitracin ointment over entire wound. Do NOT use Neosporin ointment.   3) Cover the wound with a band-aid or sterile non-stick gauze pad and micropore paper tape.   REPEAT THESE INSTRUCTIONS AT LEAST ONCE A DAY UNTIL THE WOUND HAS COMPLETELY HEALED.   It is an old wives tale that a wound heals better when it is exposed to air and allowed to dry out. The wound will heal faster with a better cosmetic result if it is kept moist with ointment and covered with a bandage.   Do not let the wound dry out.   IMPORTANT INFORMATION ON REVERSE SIDE   Supplies Needed:   *Cotton tipped applicators (Q-tips)   *Polysporin ointment or Bacitracin ointment (NOT NEOSPORIN)   *Band-aids, or non stick gauze pads and micropore paper tape   PATIENT INFORMATION   During the healing process you will notice a number of changes. All wounds develop a small halo of redness surrounding the wound. This means healing is occurring. Severe itching with extensive redness usually indicates sensitivity to the ointment or bandage tape used to dress the wound. You should call our office if this develops.   Swelling and/or discoloration around your surgical site is common, particularly when performed around the eye.   All wounds normally drain. The larger the wound the more drainage there will be. After 7-10 days, you will notice the wound beginning to shrink and new skin will begin to grow. The wound is healed when you can see skin has formed over the entire area. A healed wound has a healthy, shiny  look to the surface and is red to dark pink in color to normalize. Wounds may take approximately 4-6 weeks to heal. Larger wounds may take 6-8 weeks. After the wound is healed you may discontinue dressing changes.   You may experience a sensation of tightness as your wound heals. This is normal and will gradually subside.   Your healed wound may be sensitive to temperature changes. This sensitivity improves with time, but if you re having a lot of discomfort, try to avoid temperature extremes.   Patients frequently experience itching after their wound appears to have healed because of the continue healing under the skin. Plain Vaseline will help relieve the itching.         Sutured Wound Care     [facility]: [phone]    [facility]: [phone]          ? No strenuous activity for 48 hours. Resume moderate activity in 48 hours. No heavy exercising until you are seen for follow up in one week.     ? Take Tylenol as needed for discomfort.                         ? Do not drink alcoholic beverages for 48 hours.     ? Keep the pressure bandage in place for 24 hours. If the bandage becomes blood tinged or loose, reinforce it with gauze and tape.        (Refer to the reverse side of this page for management of bleeding).    ? Remove pressure bandage in 24 hours     ? Leave the flat bandage in place until your follow up appointment.    ? Keep the bandage dry. Wash around it carefully.    ? If the tape becomes soiled or starts to come off, reinforce it with additional paper tape.    ? Do not smoke for 3 weeks; smoking is detrimental to wound healing.    ? It is normal to have swelling and bruising around the surgical site. The bruising will fade in approximately 10-14 days. Elevate the area to reduce swelling.    ? Numbness, itchiness and sensitivity to temperature changes can occur after surgery and may take up to 18 months to normalize.      POSSIBLE COMPLICATIONS    BLEEDIN. Leave the  bandage in place.  2. Use tightly rolled up gauze or a cloth to apply direct pressure over the bandage for 20   minutes.  3. Reapply pressure for an additional 20 minutes if necessary  4. Call the office or go to the nearest emergency room if pressure fails to stop the bleeding.  5. Use additional gauze and tape to maintain pressure once the bleeding has stopped.        PAIN:    1. Post operative pain should slowly get better, never worse.  2. A severe increase in pain may indicate a problem. Call the office if this occurs.    In case of emergency phone:Dr Arellano [phone]

## 2019-01-23 ENCOUNTER — ALLIED HEALTH/NURSE VISIT (OUTPATIENT)
Dept: DERMATOLOGY | Facility: CLINIC | Age: 69
End: 2019-01-23
Payer: COMMERCIAL

## 2019-01-23 DIAGNOSIS — E78.5 HYPERLIPIDEMIA LDL GOAL <130: ICD-10-CM

## 2019-01-23 DIAGNOSIS — Z48.01 ENCOUNTER FOR CHANGE OR REMOVAL OF SURGICAL WOUND DRESSING: Primary | ICD-10-CM

## 2019-01-23 PROCEDURE — 82550 ASSAY OF CK (CPK): CPT | Performed by: INTERNAL MEDICINE

## 2019-01-23 PROCEDURE — 80076 HEPATIC FUNCTION PANEL: CPT | Performed by: INTERNAL MEDICINE

## 2019-01-23 PROCEDURE — 80061 LIPID PANEL: CPT | Performed by: INTERNAL MEDICINE

## 2019-01-23 PROCEDURE — 36415 COLL VENOUS BLD VENIPUNCTURE: CPT | Performed by: INTERNAL MEDICINE

## 2019-01-23 PROCEDURE — 99207 ZZC NO CHARGE NURSE ONLY: CPT

## 2019-01-23 NOTE — NURSING NOTE
Pt returned to clinic for post surgery 1 week follow up bandage change. Pt has no complaints, denies pain. Bandage removed from right arm, area cleansed. Site is healing and wound edges approximating well. Reapplied new steri strips and paper tape.    Advised to watch for signs/sx of infection; spreading redness, drainage, odor, fever. Call or report promptly to clinic. Pt given written instructions and informed to rtc as needed. Patient verbalized understanding.    Margarita Girard LPN  Dermatology [facility]  [phone]

## 2019-01-23 NOTE — PATIENT INSTRUCTIONS

## 2019-01-24 DIAGNOSIS — E78.5 HYPERLIPIDEMIA LDL GOAL <130: ICD-10-CM

## 2019-01-24 DIAGNOSIS — I10 ESSENTIAL HYPERTENSION: Primary | ICD-10-CM

## 2019-01-24 LAB
ALBUMIN SERPL-MCNC: 4.1 G/DL (ref 3.4–5)
ALP SERPL-CCNC: 100 U/L (ref 40–150)
ALT SERPL W P-5'-P-CCNC: 39 U/L (ref 0–50)
AST SERPL W P-5'-P-CCNC: 21 U/L (ref 0–45)
BILIRUB DIRECT SERPL-MCNC: 0.2 MG/DL (ref 0–0.2)
BILIRUB SERPL-MCNC: 0.5 MG/DL (ref 0.2–1.3)
CHOLEST SERPL-MCNC: 162 MG/DL
CK SERPL-CCNC: 69 U/L (ref 30–225)
HDLC SERPL-MCNC: 55 MG/DL
LDLC SERPL CALC-MCNC: 81 MG/DL
NONHDLC SERPL-MCNC: 107 MG/DL
PROT SERPL-MCNC: 7.2 G/DL (ref 6.8–8.8)
TRIGL SERPL-MCNC: 131 MG/DL

## 2019-01-28 ENCOUNTER — THERAPY VISIT (OUTPATIENT)
Dept: PHYSICAL THERAPY | Facility: CLINIC | Age: 69
End: 2019-01-28
Payer: COMMERCIAL

## 2019-01-28 DIAGNOSIS — M25.512 LEFT SHOULDER PAIN: ICD-10-CM

## 2019-01-28 DIAGNOSIS — M22.41 CHONDROMALACIA OF RIGHT PATELLA: ICD-10-CM

## 2019-01-28 PROCEDURE — 97112 NEUROMUSCULAR REEDUCATION: CPT | Mod: GP | Performed by: PHYSICAL THERAPIST

## 2019-01-28 PROCEDURE — 97110 THERAPEUTIC EXERCISES: CPT | Mod: GP | Performed by: PHYSICAL THERAPIST

## 2019-01-28 ASSESSMENT — ACTIVITIES OF DAILY LIVING (ADL)
WALK: ACTIVITY IS NOT DIFFICULT
HOW_WOULD_YOU_RATE_THE_OVERALL_FUNCTION_OF_YOUR_KNEE_DURING_YOUR_USUAL_DAILY_ACTIVITIES?: NEARLY NORMAL
KNEEL ON THE FRONT OF YOUR KNEE: ACTIVITY IS MINIMALLY DIFFICULT
GO DOWN STAIRS: ACTIVITY IS NOT DIFFICULT
SIT WITH YOUR KNEE BENT: ACTIVITY IS SOMEWHAT DIFFICULT
STAND: ACTIVITY IS NOT DIFFICULT
SWELLING: I DO NOT HAVE THE SYMPTOM
RISE FROM A CHAIR: ACTIVITY IS NOT DIFFICULT
KNEE_ACTIVITY_OF_DAILY_LIVING_SUM: 61
LIMPING: I DO NOT HAVE THE SYMPTOM
SQUAT: ACTIVITY IS VERY DIFFICULT
RAW_SCORE: 61
STIFFNESS: I DO NOT HAVE THE SYMPTOM
PAIN: I HAVE THE SYMPTOM BUT IT DOES NOT AFFECT MY ACTIVITY
KNEE_ACTIVITY_OF_DAILY_LIVING_SCORE: 87.14
GO UP STAIRS: ACTIVITY IS MINIMALLY DIFFICULT
AS_A_RESULT_OF_YOUR_KNEE_INJURY,_HOW_WOULD_YOU_RATE_YOUR_CURRENT_LEVEL_OF_DAILY_ACTIVITY?: NEARLY NORMAL
HOW_WOULD_YOU_RATE_THE_CURRENT_FUNCTION_OF_YOUR_KNEE_DURING_YOUR_USUAL_DAILY_ACTIVITIES_ON_A_SCALE_FROM_0_TO_100_WITH_100_BEING_YOUR_LEVEL_OF_KNEE_FUNCTION_PRIOR_TO_YOUR_INJURY_AND_0_BEING_THE_INABILITY_TO_PERFORM_ANY_OF_YOUR_USUAL_DAILY_ACTIVITIES?: 90
GIVING WAY, BUCKLING OR SHIFTING OF KNEE: I DO NOT HAVE THE SYMPTOM
WEAKNESS: I DO NOT HAVE THE SYMPTOM

## 2019-01-28 NOTE — PROGRESS NOTES
"DISCHARGE  REPORT    Progress reporting period is from Dec 31, 2018 to Jan 28, 2019.       SUBJECTIVE  Subjective changes noted by patient:  Patient feels like she has been doing very well. Stairs have been no problem. Feels a little \"weakness\"/uncertainty sometimes, mostly if she does have a hand on a handrail.      Current pain level is 0/10.     Initial Pain level: 7/10.   Changes in function:  Yes (See Goal flowsheet attached for changes in current functional level)  Adverse reaction to treatment or activity: None    OBJECTIVE  Changes noted in objective findings:  Yes, Patient demonstrates improvement in hip abduction strength to 4+/5 bilaterally.         ASSESSMENT/PLAN  Updated problem list and treatment plan: Diagnosis 1:  Right knee pain with comorbid hip weakness  STG/LTGs have been met or progress has been made towards goals:  Yes (See Goal flow sheet completed today.)  Assessment of Progress: The patient's condition is improving.  Self Management Plans:  Patient is independent in a home treatment program.  Patient is independent in self management of symptoms.  I have re-evaluated this patient and find that the nature, scope, duration and intensity of the therapy is appropriate for the medical condition of the patient.  Yandy continues to require the following intervention to meet STG and LTG's:  PT intervention is no longer required to meet STG/LTG.    Recommendations:  This patient is ready to be discharged from therapy and continue their home treatment program.    Please refer to the daily flowsheet for treatment today, total treatment time and time spent performing 1:1 timed codes.        "

## 2019-01-28 NOTE — PROGRESS NOTES
DISCHARGE  REPORT    Progress reporting period is from Jan 14, 2019 to Jan 28, 2019.       SUBJECTIVE  Subjective changes noted by patient: Able sleep on the left side. Notices some pain with certain motions. Hard to tell if the repeated adduction exercises was helpful because of lots of othe rhealth issues (sick, sick cancer removed, etc.).     Current pain level is 0/10  .     Initial Pain level: 7/10.   Changes in function:  Yes (See Goal flowsheet attached for changes in current functional level)  Adverse reaction to treatment or activity: None    OBJECTIVE  Changes noted in objective findings:  Yes, Patient demonstrates improved AROM and strength without pain    Shoulder: (* indicates patient's pain)    AROM R AROM L MMT R MMT L   Flex/  Elevation   145   5   Abd   135*   5             ASSESSMENT/PLAN  Updated problem list and treatment plan: Diagnosis 1:  Left Shoulder Pain  STG/LTGs have been met or progress has been made towards goals:  Yes (See Goal flow sheet completed today.)  Assessment of Progress: The patient's condition is improving.  Self Management Plans:  Patient is independent in a home treatment program.  Patient is independent in self management of symptoms.  I have re-evaluated this patient and find that the nature, scope, duration and intensity of the therapy is appropriate for the medical condition of the patient.  Yandy continues to require the following intervention to meet STG and LTG's:  PT intervention is no longer required to meet STG/LTG.    Recommendations:  This patient is ready to be discharged from therapy and continue their home treatment program.    Please refer to the daily flowsheet for treatment today, total treatment time and time spent performing 1:1 timed codes.

## 2019-03-13 ENCOUNTER — ANCILLARY PROCEDURE (OUTPATIENT)
Dept: MAMMOGRAPHY | Facility: CLINIC | Age: 69
End: 2019-03-13
Attending: INTERNAL MEDICINE
Payer: COMMERCIAL

## 2019-03-13 DIAGNOSIS — Z12.31 ENCOUNTER FOR SCREENING MAMMOGRAM FOR BREAST CANCER: ICD-10-CM

## 2019-03-13 DIAGNOSIS — Z12.31 VISIT FOR SCREENING MAMMOGRAM: ICD-10-CM

## 2019-03-13 PROCEDURE — 77067 SCR MAMMO BI INCL CAD: CPT | Mod: TC

## 2019-04-18 ENCOUNTER — OFFICE VISIT (OUTPATIENT)
Dept: DERMATOLOGY | Facility: CLINIC | Age: 69
End: 2019-04-18
Payer: COMMERCIAL

## 2019-04-18 VITALS — OXYGEN SATURATION: 98 % | HEART RATE: 94 BPM | SYSTOLIC BLOOD PRESSURE: 130 MMHG | DIASTOLIC BLOOD PRESSURE: 77 MMHG

## 2019-04-18 DIAGNOSIS — L81.4 LENTIGO: ICD-10-CM

## 2019-04-18 DIAGNOSIS — D23.9 DERMAL NEVUS: ICD-10-CM

## 2019-04-18 DIAGNOSIS — D18.00 ANGIOMA: ICD-10-CM

## 2019-04-18 DIAGNOSIS — Z87.898 HISTORY OF ATYPICAL NEVUS: Primary | ICD-10-CM

## 2019-04-18 DIAGNOSIS — D22.9 NEVUS: ICD-10-CM

## 2019-04-18 DIAGNOSIS — L82.1 SEBORRHEIC KERATOSIS: ICD-10-CM

## 2019-04-18 PROCEDURE — 99213 OFFICE O/P EST LOW 20 MIN: CPT | Performed by: DERMATOLOGY

## 2019-04-18 NOTE — NURSING NOTE
"Initial /77   Pulse 94   LMP 07/27/2009   SpO2 98%  Estimated body mass index is 27.88 kg/m  as calculated from the following:    Height as of 10/6/18: 1.702 m (5' 7\").    Weight as of 11/23/18: 80.7 kg (178 lb). .      "

## 2019-04-18 NOTE — PROGRESS NOTES
Yandy Zamudio is a 69 year old year old female patient here today for hx of atypical nefvus, arm healed well, no issues.  She notes spot on abdomen today.   .  Patient states this has been present for ?.  Patient reports the following symptoms:  none.  Patient reports the following previous treatments none.  Patient reports the following modifying factors none.  Associated symptoms: none.  Patient has no other skin complaints today.  Remainder of the HPI, Meds, PMH, Allergies, FH, and SH was reviewed in chart.      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 Father      Cancer Father         bladder  at age 88     Skin Cancer Father      Respiratory Mother         COPD     Skin Cancer Brother      Cerebrovascular Disease Brother        Social History     Socioeconomic History     Marital status:      Spouse name: Not on file     Number of children: Not on file     Years of education: Not on file     Highest education level: Not on file   Occupational History     Not on file   Social Needs     Financial resource strain: Not on file     Food insecurity:     Worry: Not on file     Inability: Not on file     Transportation needs:     Medical: Not on file     Non-medical: Not on file   Tobacco Use     Smoking status: Never  Smoker     Smokeless tobacco: Never Used   Substance and Sexual Activity     Alcohol use: Yes     Comment: once in a while, small amount     Drug use: No     Sexual activity: Never     Partners: Male     Birth control/protection: Surgical     Comment: tubal ligation   Lifestyle     Physical activity:     Days per week: Not on file     Minutes per session: Not on file     Stress: Not on file   Relationships     Social connections:     Talks on phone: Not on file     Gets together: Not on file     Attends Yarsanism service: Not on file     Active member of club or organization: Not on file     Attends meetings of clubs or organizations: Not on file     Relationship status: Not on file     Intimate partner violence:     Fear of current or ex partner: Not on file     Emotionally abused: Not on file     Physically abused: Not on file     Forced sexual activity: Not on file   Other Topics Concern     Parent/sibling w/ CABG, MI or angioplasty before 65F 55M? Not Asked   Social History Narrative     Not on file       Outpatient Encounter Medications as of 4/18/2019   Medication Sig Dispense Refill     amLODIPine (NORVASC) 5 MG tablet Take 1 tablet (5 mg) by mouth daily 90 tablet 3     atorvastatin (LIPITOR) 20 MG tablet Take 1 tablet (20 mg) by mouth daily 30 tablet 11     Biotin 5000 MCG CAPS Take 5,000 mcg by mouth daily       DULoxetine (CYMBALTA) 30 MG capsule TAKE THREE CAPSULES BY MOUTH ONCE DAILY IN THE MORNING 270 capsule 3     lisinopril-hydrochlorothiazide (PRINZIDE/ZESTORETIC) 10-12.5 MG per tablet Take 1 tablet by mouth every morning 90 tablet 1     meloxicam (MOBIC) 15 MG tablet Take 1 tablet (15 mg) by mouth daily 90 tablet 1     multivitamin, therapeutic with minerals (MULTI-VITAMIN) TABS tablet Take 1 tablet by mouth daily       Facility-Administered Encounter Medications as of 4/18/2019   Medication Dose Route Frequency Provider Last Rate Last Dose     methylPREDNISolone acetate (DEPO-MEDROL) injection 40  mg  40 mg   AlmazanDylan DO   40 mg at 10/06/18 1008             Review Of Systems  Skin: As above  Eyes: negative  Ears/Nose/Throat: negative  Respiratory: No shortness of breath, dyspnea on exertion, cough, or hemoptysis  Cardiovascular: negative  Gastrointestinal: negative  Genitourinary: negative  Musculoskeletal: negative  Neurologic: negative  Psychiatric: negative  Hematologic/Lymphatic/Immunologic: negative  Endocrine: negative      O:   NAD, WDWN, Alert & Oriented, Mood & Affect wnl, Vitals stable   Here today alone   LMP 07/27/2009    General appearance normal   Vitals stable   Alert, oriented and in no acute distress     R arm and nose well healed        Stuck on papules and brown macules on trunk and ext   Flesh colored papules on trunk  Pigmented macules on trunk and ext with regular and pigment networks      The remainder of expanded problem focused exam was unremarkable; the following areas were examined:  scalp/hair, conjunctiva/lids, face, neck, lips, chest, digits/nails, RUE, LUE.      Eyes: Conjunctivae/lids:Normal     ENT: Lips, buccal mucosa, tongue: normal    MSK:Normal    Cardiovascular: peripheral edema none    Pulm: Breathing Normal    Neuro/Psych: Orientation:Normal; Mood/Affect:Normal      A/P:  1. Seborrheic keratosis, lentigo, angioma, dermal nevus, nevi   BENIGN LESIONS DISCUSSED WITH PATIENT:  I discussed the specifics of tumor, prognosis, and genetics of benign lesions.  I explained that treatment of these lesions would be purely cosmetic and not medically neccessary.  I discussed with patient different removal options including excision, cautery and /or laser.      Nature and genetics of benign skin lesions dicussed with patient.  Signs and Symptoms of skin cancer discussed with patient.  ABCDEs of melanoma reviewed with patient.  Patient encouraged to perform monthly skin exams.  UV precautions reviewed with patient.  Patient to follow up with Primary Care provider regarding  elevated blood pressure.  Skin care regimen reviewed with patient: Eliminate harsh soaps, i.e. Dial, zest, irsih spring; Mild soaps such as Cetaphil or Dove sensitive skin, avoid hot or cold showers, aggressive use of emollients including vanicream, cetaphil or cerave discussed with patient.    Risks of non-melanoma skin cancer discussed with patient   Return to clinic 6 months  Pat to follow up with Primary Care provider regarding elevated blood pressure.

## 2019-04-18 NOTE — LETTER
2019         RE: Yandy Zamudio  [address] [facility] [address]        Dear Colleague,    Thank you for referring your patient, Yandy Zamudio, to the [facility]. Please see a copy of my visit note below.    Yandy Zamudio is a 69 year old year old female patient here today for hx of atypical nefvus, arm healed well, no issues.  She notes spot on abdomen today.   .  Patient states this has been present for ?.  Patient reports the following symptoms:  none.  Patient reports the following previous treatments none.  Patient reports the following modifying factors none.  Associated symptoms: none.  Patient has no other skin complaints today.  Remainder of the HPI, Meds, PMH, Allergies, FH, and SH was reviewed in chart.      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 Father      Cancer Father         bladder  at age 88     Skin Cancer Father      Respiratory Mother         COPD     Skin Cancer Brother      Cerebrovascular Disease Brother        Social History     Socioeconomic History     Marital status:      Spouse name: Not on file     Number of children: Not on file     Years of education: Not on file     Highest education level: Not on file   Occupational History     Not on  file   Social Needs     Financial resource strain: Not on file     Food insecurity:     Worry: Not on file     Inability: Not on file     Transportation needs:     Medical: Not on file     Non-medical: Not on file   Tobacco Use     Smoking status: Never Smoker     Smokeless tobacco: Never Used   Substance and Sexual Activity     Alcohol use: Yes     Comment: once in a while, small amount     Drug use: No     Sexual activity: Never     Partners: Male     Birth control/protection: Surgical     Comment: tubal ligation   Lifestyle     Physical activity:     Days per week: Not on file     Minutes per session: Not on file     Stress: Not on file   Relationships     Social connections:     Talks on phone: Not on file     Gets together: Not on file     Attends Mosque service: Not on file     Active member of club or organization: Not on file     Attends meetings of clubs or organizations: Not on file     Relationship status: Not on file     Intimate partner violence:     Fear of current or ex partner: Not on file     Emotionally abused: Not on file     Physically abused: Not on file     Forced sexual activity: Not on file   Other Topics Concern     Parent/sibling w/ CABG, MI or angioplasty before 65F 55M? Not Asked   Social History Narrative     Not on file       Outpatient Encounter Medications as of 4/18/2019   Medication Sig Dispense Refill     amLODIPine (NORVASC) 5 MG tablet Take 1 tablet (5 mg) by mouth daily 90 tablet 3     atorvastatin (LIPITOR) 20 MG tablet Take 1 tablet (20 mg) by mouth daily 30 tablet 11     Biotin 5000 MCG CAPS Take 5,000 mcg by mouth daily       DULoxetine (CYMBALTA) 30 MG capsule TAKE THREE CAPSULES BY MOUTH ONCE DAILY IN THE MORNING 270 capsule 3     lisinopril-hydrochlorothiazide (PRINZIDE/ZESTORETIC) 10-12.5 MG per tablet Take 1 tablet by mouth every morning 90 tablet 1     meloxicam (MOBIC) 15 MG tablet Take 1 tablet (15 mg) by mouth daily 90 tablet 1     multivitamin, therapeutic with  minerals (MULTI-VITAMIN) TABS tablet Take 1 tablet by mouth daily       Facility-Administered Encounter Medications as of 4/18/2019   Medication Dose Route Frequency Provider Last Rate Last Dose     methylPREDNISolone acetate (DEPO-MEDROL) injection 40 mg  40 mg   Dylan Almazan    40 mg at 10/06/18 1008             Review Of Systems  Skin: As above  Eyes: negative  Ears/Nose/Throat: negative  Respiratory: No shortness of breath, dyspnea on exertion, cough, or hemoptysis  Cardiovascular: negative  Gastrointestinal: negative  Genitourinary: negative  Musculoskeletal: negative  Neurologic: negative  Psychiatric: negative  Hematologic/Lymphatic/Immunologic: negative  Endocrine: negative      O:   NAD, WDWN, Alert & Oriented, Mood & Affect wnl, Vitals stable   Here today alone   LMP 07/27/2009    General appearance normal   Vitals stable   Alert, oriented and in no acute distress     R arm and nose well healed        Stuck on papules and brown macules on trunk and ext   Flesh colored papules on trunk  Pigmented macules on trunk and ext with regular and pigment networks      The remainder of expanded problem focused exam was unremarkable; the following areas were examined:  scalp/hair, conjunctiva/lids, face, neck, lips, chest, digits/nails, RUE, LUE.      Eyes: Conjunctivae/lids:Normal     ENT: Lips, buccal mucosa, tongue: normal    MSK:Normal    Cardiovascular: peripheral edema none    Pulm: Breathing Normal    Neuro/Psych: Orientation:Normal; Mood/Affect:Normal      A/P:  1. Seborrheic keratosis, lentigo, angioma, dermal nevus, nevi   BENIGN LESIONS DISCUSSED WITH PATIENT:  I discussed the specifics of tumor, prognosis, and genetics of benign lesions.  I explained that treatment of these lesions would be purely cosmetic and not medically neccessary.  I discussed with patient different removal options including excision, cautery and /or laser.      Nature and genetics of benign skin lesions dicussed with  patient.  Signs and Symptoms of skin cancer discussed with patient.  ABCDEs of melanoma reviewed with patient.  Patient encouraged to perform monthly skin exams.  UV precautions reviewed with patient.  Patient to follow up with Primary Care provider regarding elevated blood pressure.  Skin care regimen reviewed with patient: Eliminate harsh soaps, i.e. Dial, zest, irsih spring; Mild soaps such as Cetaphil or Dove sensitive skin, avoid hot or cold showers, aggressive use of emollients including vanicream, cetaphil or cerave discussed with patient.    Risks of non-melanoma skin cancer discussed with patient   Return to clinic 6 months  Pat to follow up with Primary Care provider regarding elevated blood pressure.      Again, thank you for allowing me to participate in the care of your patient.        Sincerely,        Bridger Arellano MD

## 2019-05-31 DIAGNOSIS — I10 ESSENTIAL HYPERTENSION WITH GOAL BLOOD PRESSURE LESS THAN 140/90: ICD-10-CM

## 2019-05-31 NOTE — TELEPHONE ENCOUNTER
"Requested Prescriptions   Pending Prescriptions Disp Refills     lisinopril-hydrochlorothiazide (PRINZIDE/ZESTORETIC) 10-12.5 MG tablet [Pharmacy Med Name: LISINOPRIL/HCTZ 10-12.5MG TAB] 90 tablet 1     Sig: TAKE 1 TABLET BY MOUTH ONCE DAILY IN THE MORNING       Diuretics (Including Combos) Protocol Passed - 5/31/2019 10:20 AM        Passed - Blood pressure under 140/90 in past 12 months     BP Readings from Last 3 Encounters:   04/18/19 130/77   01/17/19 135/76   12/18/18 131/81                 Passed - Recent (12 mo) or future (30 days) visit within the authorizing provider's specialty     Patient had office visit in the last 12 months or has a visit in the next 30 days with authorizing provider or within the authorizing provider's specialty.  See \"Patient Info\" tab in inbasket, or \"Choose Columns\" in Meds & Orders section of the refill encounter.              Passed - Medication is active on med list        Passed - Patient is age 18 or older        Passed - No active pregancy on record        Passed - Normal serum creatinine on file in past 12 months     Recent Labs   Lab Test 11/06/18  0945   CR 0.74              Passed - Normal serum potassium on file in past 12 months     Recent Labs   Lab Test 11/06/18  0945   POTASSIUM 4.1                    Passed - Normal serum sodium on file in past 12 months     Recent Labs   Lab Test 11/06/18  0945                 Passed - No positive pregnancy test in past 12 months        Last Written Prescription Date:  11/23/2018  Last Fill Quantity: 90,  # refills: 1   Last office visit: 11/23/2018 with prescribing provider:  Dr Rivera   Future Office Visit:      "

## 2019-06-04 DIAGNOSIS — I10 ESSENTIAL HYPERTENSION WITH GOAL BLOOD PRESSURE LESS THAN 140/90: ICD-10-CM

## 2019-06-04 RX ORDER — LISINOPRIL/HYDROCHLOROTHIAZIDE 10-12.5 MG
1 TABLET ORAL EVERY MORNING
Qty: 90 TABLET | Refills: 1 | Status: SHIPPED | OUTPATIENT
Start: 2019-06-04 | End: 2019-11-24

## 2019-06-04 RX ORDER — LISINOPRIL/HYDROCHLOROTHIAZIDE 10-12.5 MG
TABLET ORAL
Qty: 90 TABLET | Refills: 0 | Status: SHIPPED | OUTPATIENT
Start: 2019-06-04 | End: 2019-06-04

## 2019-06-04 NOTE — TELEPHONE ENCOUNTER
Pharmacy calling saying that they never received refill which was approved this morning.   So order re-sent to pharmacy - pt there waiting for RX.

## 2019-06-04 NOTE — TELEPHONE ENCOUNTER
"Requested Prescriptions   Pending Prescriptions Disp Refills     lisinopril-hydrochlorothiazide (PRINZIDE/ZESTORETIC) 10-12.5 MG tablet [Pharmacy Med Name: LISINOPRIL/HCTZ 10-12.5MG TAB] 90 tablet 1     Sig: TAKE 1 TABLET BY MOUTH ONCE DAILY IN THE MORNING       Diuretics (Including Combos) Protocol Passed - 6/4/2019 12:54 PM        Passed - Blood pressure under 140/90 in past 12 months     BP Readings from Last 3 Encounters:   04/18/19 130/77   01/17/19 135/76   12/18/18 131/81                 Passed - Recent (12 mo) or future (30 days) visit within the authorizing provider's specialty     Patient had office visit in the last 12 months or has a visit in the next 30 days with authorizing provider or within the authorizing provider's specialty.  See \"Patient Info\" tab in inbasket, or \"Choose Columns\" in Meds & Orders section of the refill encounter.              Passed - Medication is active on med list        Passed - Patient is age 18 or older        Passed - No active pregancy on record        Passed - Normal serum creatinine on file in past 12 months     Recent Labs   Lab Test 11/06/18  0945   CR 0.74              Passed - Normal serum potassium on file in past 12 months     Recent Labs   Lab Test 11/06/18  0945   POTASSIUM 4.1                    Passed - Normal serum sodium on file in past 12 months     Recent Labs   Lab Test 11/06/18  0945                 Passed - No positive pregnancy test in past 12 months          Last Written Prescription Date:  6/4/19  Last Fill Quantity: 90,  # refills: 0   Last office visit: 11/23/2018 with prescribing provider:  Miguel   Future Office Visit:      "

## 2019-10-01 ENCOUNTER — HEALTH MAINTENANCE LETTER (OUTPATIENT)
Age: 69
End: 2019-10-01

## 2019-10-28 ENCOUNTER — MYC MEDICAL ADVICE (OUTPATIENT)
Dept: INTERNAL MEDICINE | Facility: CLINIC | Age: 69
End: 2019-10-28

## 2019-11-11 DIAGNOSIS — E78.5 HYPERLIPIDEMIA LDL GOAL <130: ICD-10-CM

## 2019-11-11 RX ORDER — ATORVASTATIN CALCIUM 20 MG/1
TABLET, FILM COATED ORAL
Qty: 90 TABLET | Refills: 0 | Status: SHIPPED | OUTPATIENT
Start: 2019-11-11 | End: 2019-12-13

## 2019-11-11 NOTE — TELEPHONE ENCOUNTER
"Requested Prescriptions   Pending Prescriptions Disp Refills     atorvastatin (LIPITOR) 20 MG tablet [Pharmacy Med Name: ATORVASTATIN 20MG   TAB] 90 tablet 3     Sig: TAKE 1 TABLET BY MOUTH ONCE DAILY       Statins Protocol Passed - 11/11/2019  5:30 AM        Passed - LDL on file in past 12 months     Recent Labs   Lab Test 01/23/19  1030   LDL 81             Passed - No abnormal creatine kinase in past 12 months     Recent Labs   Lab Test 01/23/19  1030   CKT 69                Passed - Recent (12 mo) or future (30 days) visit within the authorizing provider's specialty     Patient has had an office visit with the authorizing provider or a provider within the authorizing providers department within the previous 12 mos or has a future within next 30 days. See \"Patient Info\" tab in inbasket, or \"Choose Columns\" in Meds & Orders section of the refill encounter.              Passed - Medication is active on med list        Passed - Patient is age 18 or older        Passed - No active pregnancy on record        Passed - No positive pregnancy test in past 12 months          "

## 2019-11-24 DIAGNOSIS — I10 ESSENTIAL HYPERTENSION WITH GOAL BLOOD PRESSURE LESS THAN 140/90: ICD-10-CM

## 2019-11-24 NOTE — LETTER
[facility]  [address] [id number] [phone]            Yandy Zamudio  [address] [facility] [address]        November 25, 2019    Dear Beatrice,    While refilling your prescription today, we noticed that you are due for an appointment with your provider.  We will refill your prescription for 30 days, but a follow-up appointment must be made before any additional refills can be approved.     Taking care of your health is important to us and we look forward to seeing you in the near future.  Please call us at [phone] or [phone] (or use Viewpoints) to schedule an appointment.     Please disregard this notice if you have already made an appointment.    Sincerely,        [facility]

## 2019-11-25 RX ORDER — LISINOPRIL/HYDROCHLOROTHIAZIDE 10-12.5 MG
TABLET ORAL
Qty: 30 TABLET | Refills: 0 | Status: SHIPPED | OUTPATIENT
Start: 2019-11-25 | End: 2019-12-13

## 2019-11-25 RX ORDER — AMLODIPINE BESYLATE 5 MG/1
TABLET ORAL
Qty: 30 TABLET | Refills: 0 | Status: SHIPPED | OUTPATIENT
Start: 2019-11-25 | End: 2019-12-13

## 2019-11-25 NOTE — TELEPHONE ENCOUNTER
"Requested Prescriptions   Pending Prescriptions Disp Refills     amLODIPine (NORVASC) 5 MG tablet [Pharmacy Med Name: AMLODIPINE 5MG TAB] 90 tablet 3     Sig: TAKE 1 TABLET BY MOUTH ONCE DAILY   Last Written Prescription Date:  11/23/2018  Last Fill Quantity: 90,  # refills: 3   Last Office Visit: 11/23/2018   Future Office Visit:         Calcium Channel Blockers Protocol  Failed - 11/24/2019 11:40 AM        Failed - Recent (12 mo) or future (30 days) visit within the authorizing provider's specialty     Patient has had an office visit with the authorizing provider or a provider within the authorizing providers department within the previous 12 mos or has a future within next 30 days. See \"Patient Info\" tab in inbasket, or \"Choose Columns\" in Meds & Orders section of the refill encounter.              Failed - Normal serum creatinine on file in past 12 months     Recent Labs   Lab Test 11/06/18  0945   CR 0.74             Passed - Blood pressure under 140/90 in past 12 months     BP Readings from Last 3 Encounters:   04/18/19 130/77   01/17/19 135/76   12/18/18 131/81                 Passed - Medication is active on med list        Passed - Patient is age 18 or older        Passed - No active pregnancy on record        Passed - No positive pregnancy test in past 12 months        lisinopril-hydrochlorothiazide (PRINZIDE/ZESTORETIC) 10-12.5 MG tablet [Pharmacy Med Name: LISINOPRIL/HCTZ 10-12.5MG TAB] 90 tablet 1     Sig: TAKE 1 TABLET BY MOUTH ONCE DAILY IN THE MORNING   Last Written Prescription Date:  6/4/2019  Last Fill Quantity: 90,  # refills: 1   Last Office Visit: 11/23/2018   Future Office Visit:         Diuretics (Including Combos) Protocol Failed - 11/24/2019 11:40 AM        Failed - Recent (12 mo) or future (30 days) visit within the authorizing provider's specialty     Patient has had an office visit with the authorizing provider or a provider within the authorizing providers department within the previous " "12 mos or has a future within next 30 days. See \"Patient Info\" tab in inbasket, or \"Choose Columns\" in Meds & Orders section of the refill encounter.              Failed - Normal serum creatinine on file in past 12 months     Recent Labs   Lab Test 11/06/18  0945   CR 0.74              Failed - Normal serum potassium on file in past 12 months     Recent Labs   Lab Test 11/06/18  0945   POTASSIUM 4.1                    Failed - Normal serum sodium on file in past 12 months     Recent Labs   Lab Test 11/06/18  0945                 Passed - Blood pressure under 140/90 in past 12 months     BP Readings from Last 3 Encounters:   04/18/19 130/77   01/17/19 135/76   12/18/18 131/81                 Passed - Medication is active on med list        Passed - Patient is age 18 or older        Passed - No active pregancy on record        Passed - No positive pregnancy test in past 12 months          "

## 2019-11-26 DIAGNOSIS — I10 ESSENTIAL HYPERTENSION: ICD-10-CM

## 2019-11-26 DIAGNOSIS — E78.5 HYPERLIPIDEMIA LDL GOAL <130: ICD-10-CM

## 2019-11-26 LAB
ALBUMIN SERPL-MCNC: 3.9 G/DL (ref 3.4–5)
ALP SERPL-CCNC: 107 U/L (ref 40–150)
ALT SERPL W P-5'-P-CCNC: 37 U/L (ref 0–50)
ANION GAP SERPL CALCULATED.3IONS-SCNC: 7 MMOL/L (ref 3–14)
AST SERPL W P-5'-P-CCNC: 21 U/L (ref 0–45)
BILIRUB SERPL-MCNC: 0.6 MG/DL (ref 0.2–1.3)
BUN SERPL-MCNC: 20 MG/DL (ref 7–30)
CALCIUM SERPL-MCNC: 9.1 MG/DL (ref 8.5–10.1)
CHLORIDE SERPL-SCNC: 104 MMOL/L (ref 94–109)
CHOLEST SERPL-MCNC: 177 MG/DL
CO2 SERPL-SCNC: 28 MMOL/L (ref 20–32)
CREAT SERPL-MCNC: 0.64 MG/DL (ref 0.52–1.04)
GFR SERPL CREATININE-BSD FRML MDRD: >90 ML/MIN/{1.73_M2}
GLUCOSE SERPL-MCNC: 102 MG/DL (ref 70–99)
HDLC SERPL-MCNC: 76 MG/DL
LDLC SERPL CALC-MCNC: 80 MG/DL
NONHDLC SERPL-MCNC: 101 MG/DL
POTASSIUM SERPL-SCNC: 3.8 MMOL/L (ref 3.4–5.3)
PROT SERPL-MCNC: 7.2 G/DL (ref 6.8–8.8)
SODIUM SERPL-SCNC: 139 MMOL/L (ref 133–144)
TRIGL SERPL-MCNC: 106 MG/DL

## 2019-11-26 PROCEDURE — 80053 COMPREHEN METABOLIC PANEL: CPT | Performed by: INTERNAL MEDICINE

## 2019-11-26 PROCEDURE — 36415 COLL VENOUS BLD VENIPUNCTURE: CPT | Performed by: INTERNAL MEDICINE

## 2019-11-26 PROCEDURE — 80061 LIPID PANEL: CPT | Performed by: INTERNAL MEDICINE

## 2019-12-10 ASSESSMENT — ACTIVITIES OF DAILY LIVING (ADL): CURRENT_FUNCTION: NO ASSISTANCE NEEDED

## 2019-12-13 ENCOUNTER — OFFICE VISIT (OUTPATIENT)
Dept: INTERNAL MEDICINE | Facility: CLINIC | Age: 69
End: 2019-12-13
Payer: COMMERCIAL

## 2019-12-13 DIAGNOSIS — F33.2 MAJOR DEPRESSIVE DISORDER, RECURRENT, SEVERE WITHOUT PSYCHOTIC FEATURES (H): ICD-10-CM

## 2019-12-13 DIAGNOSIS — E78.5 HYPERLIPIDEMIA LDL GOAL <130: ICD-10-CM

## 2019-12-13 DIAGNOSIS — M19.91 PRIMARY OSTEOARTHRITIS, UNSPECIFIED SITE: ICD-10-CM

## 2019-12-13 DIAGNOSIS — Z12.31 ENCOUNTER FOR SCREENING MAMMOGRAM FOR BREAST CANCER: ICD-10-CM

## 2019-12-13 DIAGNOSIS — Z23 NEED FOR PROPHYLACTIC VACCINATION AND INOCULATION AGAINST INFLUENZA: ICD-10-CM

## 2019-12-13 DIAGNOSIS — I10 ESSENTIAL HYPERTENSION WITH GOAL BLOOD PRESSURE LESS THAN 140/90: ICD-10-CM

## 2019-12-13 DIAGNOSIS — Z00.00 MEDICARE ANNUAL WELLNESS VISIT, INITIAL: ICD-10-CM

## 2019-12-13 DIAGNOSIS — Z12.11 SCREEN FOR COLON CANCER: ICD-10-CM

## 2019-12-13 PROCEDURE — 90662 IIV NO PRSV INCREASED AG IM: CPT | Performed by: INTERNAL MEDICINE

## 2019-12-13 PROCEDURE — G0008 ADMIN INFLUENZA VIRUS VAC: HCPCS | Performed by: INTERNAL MEDICINE

## 2019-12-13 PROCEDURE — 99397 PER PM REEVAL EST PAT 65+ YR: CPT | Mod: 25 | Performed by: INTERNAL MEDICINE

## 2019-12-13 PROCEDURE — 99214 OFFICE O/P EST MOD 30 MIN: CPT | Mod: 25 | Performed by: INTERNAL MEDICINE

## 2019-12-13 PROCEDURE — 99207 C PAF COMPLETED  NO CHARGE: CPT | Mod: 25 | Performed by: INTERNAL MEDICINE

## 2019-12-13 RX ORDER — LISINOPRIL/HYDROCHLOROTHIAZIDE 10-12.5 MG
TABLET ORAL
Qty: 90 TABLET | Refills: 3 | Status: ON HOLD | OUTPATIENT
Start: 2019-12-13 | End: 2020-06-09

## 2019-12-13 RX ORDER — ATORVASTATIN CALCIUM 20 MG/1
20 TABLET, FILM COATED ORAL DAILY
Qty: 90 TABLET | Refills: 3 | Status: ON HOLD | OUTPATIENT
Start: 2019-12-13 | End: 2020-06-09

## 2019-12-13 RX ORDER — DULOXETIN HYDROCHLORIDE 30 MG/1
CAPSULE, DELAYED RELEASE ORAL
Qty: 270 CAPSULE | Refills: 3 | Status: SHIPPED | OUTPATIENT
Start: 2019-12-13 | End: 2020-12-08

## 2019-12-13 RX ORDER — AMLODIPINE BESYLATE 5 MG/1
5 TABLET ORAL DAILY
Qty: 90 TABLET | Refills: 3 | Status: ON HOLD | OUTPATIENT
Start: 2019-12-13 | End: 2020-06-09

## 2019-12-13 RX ORDER — MELOXICAM 15 MG/1
15 TABLET ORAL DAILY
Qty: 90 TABLET | Refills: 0 | Status: SHIPPED | OUTPATIENT
Start: 2019-12-13 | End: 2019-12-30

## 2019-12-13 ASSESSMENT — MIFFLIN-ST. JEOR: SCORE: 1299.72

## 2019-12-13 ASSESSMENT — PATIENT HEALTH QUESTIONNAIRE - PHQ9: SUM OF ALL RESPONSES TO PHQ QUESTIONS 1-9: 2

## 2019-12-13 ASSESSMENT — ACTIVITIES OF DAILY LIVING (ADL): CURRENT_FUNCTION: NO ASSISTANCE NEEDED

## 2019-12-13 NOTE — PATIENT INSTRUCTIONS
Continue current meds  Prescriptions refilled.    Call  [phone] or use [phone] to schedule a future lab appointment  fasting in 1 year.   For fasting labs, please refrain from eating for 8 hours or more.   Drink 2 glasses of water before your lab appointment. It is fine to take your  oral medications on the morning of the lab test as usual  Follow up with me a few days after these future labs are drawn to review results and other medical issues as necessary  Mammogram   After 3/13/20   Vaccinations: Tetanus (Td) vaccine - get at pharmacy  Colonoscopy  with  [facility]. They will call to schedule. If not heard from them in 1 week, then call [phone].  Hold Meloxicam 1 week prior  Check with insurance or speak with your pharmacist re: Shingrix vaccine coverage for shingles prevention.  This is a 2 shot series done 2-6 months apart   Healthcare  Directive discussed and given copy.   Patient to complete and return to clinic  Continue diet/exercise  Pt was informed regarding extra E&M billing for management of new or established medical issues not related to today's wellness visit

## 2019-12-13 NOTE — PROGRESS NOTES
"SUBJECTIVE:   Yandy Zamudio is a 69 year old female who presents for Preventive Visit.  Are you in the first 12 months of your Medicare coverage?  No    Healthy Habits:     In general, how would you rate your overall health?  Excellent    Frequency of exercise:  None    Do you usually eat at least 4 servings of fruit and vegetables a day, include whole grains    & fiber and avoid regularly eating high fat or \"junk\" foods?  Yes    Taking medications regularly:  Yes    Medication side effects:  None    Ability to successfully perform activities of daily living:  No assistance needed    Home Safety:  No safety concerns identified    Hearing Impairment:  No hearing concerns    In the past 6 months, have you been bothered by leaking of urine?  No    In general, how would you rate your overall mental or emotional health?  Excellent      PHQ-2 Total Score: 0    Additional concerns today:  No    Do you feel safe in your environment? Yes    Have you ever done Advance Care Planning? (For example, a Health Directive, POLST, or a discussion with a medical provider or your loved ones about your wishes): No, advance care planning information given to patient to review.  Patient plans to discuss their wishes with loved ones or provider.      Fall risk  Fallen 2 or more times in the past year?: No  Any fall with injury in the past year?: No    Cognitive Screening   1) Repeat 3 items (Leader, Season, Table)    2) Clock draw: NORMAL  3) 3 item recall: Recalls 3 objects  Results: 3 items recalled: COGNITIVE IMPAIRMENT LESS LIKELY    Mini-CogTM Copyright S Aryan. Licensed by the author for use in [facility]; reprinted with permission ([email or web address]@.[email or web address]). All rights reserved.      Do you have sleep apnea, excessive snoring or daytime drowsiness?: no    Reviewed and updated as needed this visit by clinical staff         Reviewed and updated as needed this visit by Provider        Social History     Tobacco Use     Smoking " status: Never Smoker     Smokeless tobacco: Never Used   Substance Use Topics     Alcohol use: Yes     Comment: once in a while, small amount     If you drink alcohol do you typically have >3 drinks per day or >7 drinks per week? No    Component      Latest Ref Rng & Units 11/26/2019   Sodium      133 - 144 mmol/L 139   Potassium      3.4 - 5.3 mmol/L 3.8   Chloride      94 - 109 mmol/L 104   Carbon Dioxide      20 - 32 mmol/L 28   Anion Gap      3 - 14 mmol/L 7   Glucose      70 - 99 mg/dL 102 (H)   Urea Nitrogen      7 - 30 mg/dL 20   Creatinine      0.52 - 1.04 mg/dL 0.64   GFR Estimate      >60 mL/min/1.73:m2 >90   GFR Estimate If Black      >60 mL/min/1.73:m2 >90   Calcium      8.5 - 10.1 mg/dL 9.1   Bilirubin Total      0.2 - 1.3 mg/dL 0.6   Albumin      3.4 - 5.0 g/dL 3.9   Protein Total      6.8 - 8.8 g/dL 7.2   Alkaline Phosphatase      40 - 150 U/L 107   ALT      0 - 50 U/L 37   AST      0 - 45 U/L 21   Cholesterol      <200 mg/dL 177   Triglycerides      <150 mg/dL 106   HDL Cholesterol      >49 mg/dL 76   LDL Cholesterol Calculated      <100 mg/dL 80   Non HDL Cholesterol      <130 mg/dL 101         Current providers sharing in care for this patient include:   Patient Care Team:  Omid Rivera MD as PCP - General  Omid Rivera MD as Assigned PCP  Mihai Viveros as Other (see comments) (Therapist)    The following health maintenance items are reviewed in Epic and correct as of today:  Health Maintenance   Topic Date Due     ZOSTER IMMUNIZATION (1 of 2) 03/09/2001     ADVANCE CARE PLANNING  01/12/2017     DTAP/TDAP/TD IMMUNIZATION (2 - Td) 12/11/2017     PHQ-9  05/23/2019     INFLUENZA VACCINE (1) 09/01/2019     FALL RISK ASSESSMENT  11/23/2019     MEDICARE ANNUAL WELLNESS VISIT  11/23/2019     BMP  05/26/2020     LIPID  11/26/2020     MAMMO SCREENING  03/13/2021     DEXA  06/01/2022     COLONOSCOPY  05/22/2024     HEPATITIS C SCREENING  Completed     DEPRESSION ACTION PLAN  Completed      "PNEUMOCOCCAL IMMUNIZATION 65+ LOW/MEDIUM RISK  Completed     IPV IMMUNIZATION  Aged Out     MENINGITIS IMMUNIZATION  Aged Out     Labs reviewed in EPIC      Review of Systems  CONSTITUTIONAL: NEGATIVE for fever, chills. Weight down 20 pounds in 1 year  INTEGUMENTARY/SKIN: NEGATIVE for worrisome rashes, moles or lesions  EYES: NEGATIVE for vision changes. Occ floaters. Eye exam 1 year ago  ENT/MOUTH: NEGATIVE for ear, mouth and throat problems  RESP: NEGATIVE for significant cough or SOB  BREAST: NEGATIVE for masses, tenderness or discharge  CV: NEGATIVE for chest pain, palpitations or peripheral edema  GI: NEGATIVE for nausea, abdominal pain, heartburn, or change in bowel habits  : NEGATIVE for frequency, dysuria, or hematuria  MUSCULOSKELETAL:  POSITIVE for stable osteoarthritis with meloxicam  NEURO: NEGATIVE for weakness, dizziness or paresthesias  ENDOCRINE: NEGATIVE for temperature intolerance. On statin  HEME: NEGATIVE for bleeding problems  PSYCHIATRIC: NEGATIVE for changes in mood or affect with meds. PHQ=2    OBJECTIVE:   /76   Pulse 92   Temp 98.2  F (36.8  C) (Temporal)   Resp 16   Ht 1.676 m (5' 6\")   Wt 75.3 kg (166 lb)   LMP 07/27/2009   SpO2 99%   BMI 26.79 kg/m   Estimated body mass index is 26.79 kg/m  as calculated from the following:    Height as of this encounter: 1.676 m (5' 6\").    Weight as of this encounter: 75.3 kg (166 lb).  Physical Exam  General appearance - healthy, alert, no distress  Skin - No rashes or lesions.  Head - normocephalic, atraumatic  Eyes - CHRISTINA, EOMI, fundi exam with nondilated pupils negative.  Ears - External ears normal. Canals clear. TM's normal.  Nose/Sinuses - Nares normal. Septum midline. Mucosa normal. No drainage or sinus tenderness.  Oropharynx - No erythema, no adenopathy, no exudates.  Neck - Supple without adenopathy or thyromegaly. No bruits.  Lungs - Clear to auscultation without wheezes/rhonchi.  Heart - Regular rate and rhythm without " murmurs, clicks, or gallops.  Nodes - No supraclavicular, axillary, or inguinal adenopathy palpable.  Breasts - deferred  Abdomen - Abdomen soft, non-tender. BS normal. No masses or hepatosplenomegaly palpable. No bruits.  Extremities -No cyanosis, clubbing or edema.    Musculoskeletal - Spine ROM normal. Muscular strength intact.   Peripheral pulses - radial=4/4, femoral=4/4, posterior tibial=4/4, dorsalis pedis=4/4,  Neuro - Gait normal. Reflexes normal and symmetric. Sensation grossly WNL.  Genital/Rectal - deferred    ASSESSMENT / PLAN:   1. Medicare annual wellness visit, initial   See below for HCM. Otherwise UTD. FBG minimally above normal and should improve with further diet/weight loss    2. Essential hypertension with goal blood pressure less than 140/90  Controlled. Continue med. Future labs as ordered  - amLODIPine (NORVASC) 5 MG tablet; Take 1 tablet (5 mg) by mouth daily  Dispense: 90 tablet; Refill: 3  - lisinopril-hydrochlorothiazide (PRINZIDE/ZESTORETIC) 10-12.5 MG tablet; TAKE 1 TABLET BY MOUTH ONCE DAILY IN THE MORNING  Dispense: 90 tablet; Refill: 3  - Comprehensive metabolic panel; Future    3. Hyperlipidemia LDL goal <130  Controlled. Continue med. Future labs as ordered  - atorvastatin (LIPITOR) 20 MG tablet; Take 1 tablet (20 mg) by mouth daily  Dispense: 90 tablet; Refill: 3  - Lipid panel reflex to direct LDL Fasting; Future  - Comprehensive metabolic panel; Future    4. Major depressive disorder, recurrent, severe without psychotic features (H)  Controlled. Continue med.  PHQ=2  - DULoxetine (CYMBALTA) 30 MG capsule; TAKE THREE CAPSULES BY MOUTH ONCE DAILY IN THE MORNING  Dispense: 270 capsule; Refill: 3    5. Primary osteoarthritis, unspecified site  Controlled. Continue med. GFR OK and no GI side effects reported by pt  - meloxicam (MOBIC) 15 MG tablet; Take 1 tablet (15 mg) by mouth daily  Dispense: 90 tablet; Refill: 3    6. Screen for colon cancer   Due for colonoscopy screening  -  "GASTROENTEROLOGY ADULT REF PROCEDURE ONLY Other; [facility] [phone]    7. Encounter for screening mammogram for breast cancer  - *MA Screening Digital Bilateral; Future    8. Need for prophylactic vaccination and inoculation against influenza  - INFLUENZA (HIGH DOSE) 3 VALENT VACCINE [44864]  - ADMIN INFLUENZA (For MEDICARE Patients ONLY) []      COUNSELING:  Reviewed preventive health counseling, as reflected in patient instructions    Estimated body mass index is 27.88 kg/m  as calculated from the following:    Height as of 10/6/18: 1.702 m (5' 7\").    Weight as of 11/23/18: 80.7 kg (178 lb).         reports that she has never smoked. She has never used smokeless tobacco.      Appropriate preventive services were discussed with this patient, including applicable screening as appropriate for cardiovascular disease, diabetes, osteopenia/osteoporosis, and glaucoma.  As appropriate for age/gender, discussed screening for colorectal cancer, prostate cancer, breast cancer, and cervical cancer. Checklist reviewing preventive services available has been given to the patient.    Reviewed patients plan of care and provided an AVS. The Basic Care Plan (routine screening as documented in Health Maintenance) for Yandy meets the Care Plan requirement. This Care Plan has been established and reviewed with the Patient.    Counseling Resources:  ATP IV Guidelines  Pooled Cohorts Equation Calculator  Breast Cancer Risk Calculator  FRAX Risk Assessment  ICSI Preventive Guidelines  Dietary Guidelines for Americans, 2010  USDA's MyPlate  ASA Prophylaxis  Lung CA Screening    PLAN:  Continue current meds  Prescriptions refilled.    Call  [phone] or use AQH to schedule a future lab appointment  fasting in 1 year.   For fasting labs, please refrain from eating for 8 hours or more.   Drink 2 glasses of water before your lab appointment. It is fine to take your  oral medications on the morning of the lab test as " usual  Follow up with me a few days after these future labs are drawn to review results and other medical issues as necessary  Mammogram   After 3/13/20   Vaccinations: Tetanus (Td) vaccine - get at pharmacy  Colonoscopy  with  [facility]. They will call to schedule. If not heard from them in 1 week, then call [phone].  Hold Meloxicam 1 week prior  Check with insurance or speak with your pharmacist re: Shingrix vaccine coverage for shingles prevention.  This is a 2 shot series done 2-6 months apart   Healthcare  Directive discussed and given copy.   Patient to complete and return to clinic  Continue diet/exercise  Pt was informed regarding extra E&M billing for management of new or established medical issues not related to today's wellness visit      Omid Rivera MD  [facility]

## 2019-12-30 VITALS
HEART RATE: 92 BPM | TEMPERATURE: 98.2 F | SYSTOLIC BLOOD PRESSURE: 128 MMHG | DIASTOLIC BLOOD PRESSURE: 76 MMHG | BODY MASS INDEX: 26.68 KG/M2 | HEIGHT: 66 IN | WEIGHT: 166 LBS | RESPIRATION RATE: 16 BRPM | OXYGEN SATURATION: 99 %

## 2019-12-30 RX ORDER — MELOXICAM 15 MG/1
15 TABLET ORAL DAILY
Qty: 90 TABLET | Refills: 3 | Status: ON HOLD | OUTPATIENT
Start: 2019-12-30 | End: 2020-06-09

## 2020-06-08 ENCOUNTER — NURSE TRIAGE (OUTPATIENT)
Dept: INTERNAL MEDICINE | Facility: CLINIC | Age: 70
End: 2020-06-08

## 2020-06-08 ENCOUNTER — APPOINTMENT (OUTPATIENT)
Dept: CT IMAGING | Facility: CLINIC | Age: 70
End: 2020-06-08
Attending: INTERNAL MEDICINE
Payer: COMMERCIAL

## 2020-06-08 ENCOUNTER — HOSPITAL ENCOUNTER (OUTPATIENT)
Facility: CLINIC | Age: 70
Setting detail: OBSERVATION
Discharge: HOME | End: 2020-06-09
Attending: INTERNAL MEDICINE | Admitting: HOSPITALIST
Payer: COMMERCIAL

## 2020-06-08 ENCOUNTER — MYC MEDICAL ADVICE (OUTPATIENT)
Dept: INTERNAL MEDICINE | Facility: CLINIC | Age: 70
End: 2020-06-08

## 2020-06-08 ENCOUNTER — OFFICE VISIT (OUTPATIENT)
Dept: URGENT CARE | Facility: URGENT CARE | Age: 70
End: 2020-06-08
Payer: COMMERCIAL

## 2020-06-08 VITALS
DIASTOLIC BLOOD PRESSURE: 78 MMHG | WEIGHT: 177.2 LBS | SYSTOLIC BLOOD PRESSURE: 164 MMHG | TEMPERATURE: 99.3 F | BODY MASS INDEX: 28.6 KG/M2 | RESPIRATION RATE: 20 BRPM | OXYGEN SATURATION: 98 % | HEART RATE: 109 BPM

## 2020-06-08 DIAGNOSIS — R07.89 CHEST PRESSURE: Primary | ICD-10-CM

## 2020-06-08 DIAGNOSIS — R06.00 DYSPNEA, UNSPECIFIED TYPE: ICD-10-CM

## 2020-06-08 DIAGNOSIS — R00.2 PALPITATIONS: ICD-10-CM

## 2020-06-08 DIAGNOSIS — R07.9 CHEST PAIN, UNSPECIFIED TYPE: ICD-10-CM

## 2020-06-08 DIAGNOSIS — R00.0 TACHYCARDIA: ICD-10-CM

## 2020-06-08 LAB
ANION GAP SERPL CALCULATED.3IONS-SCNC: 6 MMOL/L (ref 3–14)
BASOPHILS # BLD AUTO: 0.1 10E9/L (ref 0–0.2)
BASOPHILS NFR BLD AUTO: 0.6 %
BUN SERPL-MCNC: 15 MG/DL (ref 7–30)
CALCIUM SERPL-MCNC: 9.2 MG/DL (ref 8.5–10.1)
CHLORIDE SERPL-SCNC: 102 MMOL/L (ref 94–109)
CO2 SERPL-SCNC: 28 MMOL/L (ref 20–32)
CREAT SERPL-MCNC: 0.72 MG/DL (ref 0.52–1.04)
D DIMER PPP FEU-MCNC: <0.3 UG/ML FEU (ref 0–0.5)
DIFFERENTIAL METHOD BLD: ABNORMAL
EOSINOPHIL # BLD AUTO: 0.2 10E9/L (ref 0–0.7)
EOSINOPHIL NFR BLD AUTO: 2.1 %
ERYTHROCYTE [DISTWIDTH] IN BLOOD BY AUTOMATED COUNT: 12.4 % (ref 10–15)
GFR SERPL CREATININE-BSD FRML MDRD: 85 ML/MIN/{1.73_M2}
GLUCOSE SERPL-MCNC: 91 MG/DL (ref 70–99)
HCT VFR BLD AUTO: 52.4 % (ref 35–47)
HGB BLD-MCNC: 16.8 G/DL (ref 11.7–15.7)
IMM GRANULOCYTES # BLD: 0 10E9/L (ref 0–0.4)
IMM GRANULOCYTES NFR BLD: 0.2 %
LYMPHOCYTES # BLD AUTO: 2.1 10E9/L (ref 0.8–5.3)
LYMPHOCYTES NFR BLD AUTO: 21.6 %
MCH RBC QN AUTO: 28.4 PG (ref 26.5–33)
MCHC RBC AUTO-ENTMCNC: 32.1 G/DL (ref 31.5–36.5)
MCV RBC AUTO: 89 FL (ref 78–100)
MONOCYTES # BLD AUTO: 0.6 10E9/L (ref 0–1.3)
MONOCYTES NFR BLD AUTO: 6.6 %
NEUTROPHILS # BLD AUTO: 6.7 10E9/L (ref 1.6–8.3)
NEUTROPHILS NFR BLD AUTO: 68.9 %
NRBC # BLD AUTO: 0 10*3/UL
NRBC BLD AUTO-RTO: 0 /100
PLATELET # BLD AUTO: 292 10E9/L (ref 150–450)
POTASSIUM SERPL-SCNC: 3.6 MMOL/L (ref 3.4–5.3)
RBC # BLD AUTO: 5.91 10E12/L (ref 3.8–5.2)
SODIUM SERPL-SCNC: 136 MMOL/L (ref 133–144)
TROPONIN I SERPL-MCNC: <0.015 UG/L (ref 0–0.04)
WBC # BLD AUTO: 9.7 10E9/L (ref 4–11)

## 2020-06-08 PROCEDURE — C9803 HOPD COVID-19 SPEC COLLECT: HCPCS

## 2020-06-08 PROCEDURE — 93000 ELECTROCARDIOGRAM COMPLETE: CPT | Performed by: NURSE PRACTITIONER

## 2020-06-08 PROCEDURE — 85379 FIBRIN DEGRADATION QUANT: CPT | Performed by: INTERNAL MEDICINE

## 2020-06-08 PROCEDURE — 85025 COMPLETE CBC W/AUTO DIFF WBC: CPT | Performed by: INTERNAL MEDICINE

## 2020-06-08 PROCEDURE — 87635 SARS-COV-2 COVID-19 AMP PRB: CPT | Performed by: INTERNAL MEDICINE

## 2020-06-08 PROCEDURE — 84439 ASSAY OF FREE THYROXINE: CPT | Performed by: INTERNAL MEDICINE

## 2020-06-08 PROCEDURE — 93005 ELECTROCARDIOGRAM TRACING: CPT

## 2020-06-08 PROCEDURE — 80048 BASIC METABOLIC PNL TOTAL CA: CPT | Performed by: INTERNAL MEDICINE

## 2020-06-08 PROCEDURE — 99285 EMERGENCY DEPT VISIT HI MDM: CPT | Mod: 25

## 2020-06-08 PROCEDURE — 25000128 H RX IP 250 OP 636: Performed by: INTERNAL MEDICINE

## 2020-06-08 PROCEDURE — 71275 CT ANGIOGRAPHY CHEST: CPT

## 2020-06-08 PROCEDURE — 83880 ASSAY OF NATRIURETIC PEPTIDE: CPT | Performed by: INTERNAL MEDICINE

## 2020-06-08 PROCEDURE — 99215 OFFICE O/P EST HI 40 MIN: CPT | Performed by: NURSE PRACTITIONER

## 2020-06-08 PROCEDURE — 25000125 ZZHC RX 250: Performed by: INTERNAL MEDICINE

## 2020-06-08 PROCEDURE — 84443 ASSAY THYROID STIM HORMONE: CPT | Performed by: INTERNAL MEDICINE

## 2020-06-08 PROCEDURE — 84484 ASSAY OF TROPONIN QUANT: CPT | Performed by: INTERNAL MEDICINE

## 2020-06-08 RX ORDER — IOPAMIDOL 755 MG/ML
500 INJECTION, SOLUTION INTRAVASCULAR ONCE
Status: COMPLETED | OUTPATIENT
Start: 2020-06-08 | End: 2020-06-08

## 2020-06-08 RX ADMIN — IOPAMIDOL 62 ML: 755 INJECTION, SOLUTION INTRAVENOUS at 22:44

## 2020-06-08 RX ADMIN — SODIUM CHLORIDE 81 ML: 9 INJECTION, SOLUTION INTRAVENOUS at 22:44

## 2020-06-08 NOTE — TELEPHONE ENCOUNTER
Patient Contact    Attempt # 1    Was call answered?  No.  Left message on voicemail with information to call triage back.    On call back, triage symptoms based on Tapticat message.

## 2020-06-08 NOTE — TELEPHONE ENCOUNTER
Pt called reporting feeling of missed heartbeats started today. She has noticed this for several weeks, but worse today. Her HR can go from 75 -130 just by waking up or going up the hill. She gets out of breath during this time, but not sweaty and no dizziness. Sometimes lightheaded. Family history of heart disease. Pt is worried.     Additional Information    [1] Skipped or extra beat(s) AND [2] increases with exercise or exertion    Age > 60 years (Exception: brief heart beat symptoms that went away and now feels well)    Protocols used: HEART RATE AND HEARTBEAT [id number]    Advised to go to nearest urgent care in the next 2 - 4 hours or ER if chest pain and SOB increase. Pt agrees with this plan.

## 2020-06-08 NOTE — PROGRESS NOTES
SUBJECTIVE:   Yandy Zamudio is a 70 year old female presenting with a chief complaint of skipped heart beats and her heart seems to go from slow to really fast frequently. She called triage today and they sent her in for evaluation. She has also been feeling more short of breath with minimal exertion and some intermittent chest pressure. Shortness of breath is significant enough that she has to stop because she cannot complete activities. Symptoms are becoming more intense and frequent.    Onset of symptoms was 2 week(s) ago.  Course of illness is worsening.    Severity moderate  Previous Treatment none      Past Medical History:   Diagnosis Date     Blood glucose elevated 4/27/2014     Cataract      Colon polyps 1/08    Tubulovillous adenoma. 4 mm polyp in the ascending col     Diplopia      Fibromyalgia 10/12/2014     Hyperlipidemia LDL goal <130 10/31/2010     Major depression      Menopause 2008    rare hot flash     Myalgias      Skin cancer      Unspecified essential hypertension      Current Outpatient Medications   Medication Sig Dispense Refill     amLODIPine (NORVASC) 5 MG tablet Take 1 tablet (5 mg) by mouth daily 90 tablet 3     atorvastatin (LIPITOR) 20 MG tablet Take 1 tablet (20 mg) by mouth daily 90 tablet 3     Biotin 5000 MCG CAPS Take 5,000 mcg by mouth daily       DULoxetine (CYMBALTA) 30 MG capsule TAKE THREE CAPSULES BY MOUTH ONCE DAILY IN THE MORNING 270 capsule 3     lisinopril-hydrochlorothiazide (PRINZIDE/ZESTORETIC) 10-12.5 MG tablet TAKE 1 TABLET BY MOUTH ONCE DAILY IN THE MORNING 90 tablet 3     meloxicam (MOBIC) 15 MG tablet Take 1 tablet (15 mg) by mouth daily 90 tablet 3     multivitamin, therapeutic with minerals (MULTI-VITAMIN) TABS tablet Take 1 tablet by mouth daily       Social History     Tobacco Use     Smoking status: Never Smoker     Smokeless tobacco: Never Used   Substance Use Topics     Alcohol use: Yes     Comment: once in a while, small amount     Family History    Problem Relation Age of Onset     Hypertension Father      C.A.D. Father      Cancer Father         bladder  at age 88     Skin Cancer Father      Respiratory Mother         COPD     Skin Cancer Brother      Cerebrovascular Disease Brother         ROS: 10 point ROS neg other than the symptoms noted above in the HPI.     OBJECTIVE  BP (!) 164/78 (BP Location: Right arm, Patient Position: Sitting, Cuff Size: Adult Large)   Pulse 109   Temp 99.3  F (37.4  C) (Oral)   Resp 20   Wt 80.4 kg (177 lb 3.2 oz)   LMP 2009   SpO2 98%   Breastfeeding No   BMI 28.60 kg/m        GENERAL APPEARANCE: healthy appearing, alert     EYES: PERRL, EOMI, sclera non-icteric     HENT: oral exam benign, mucus membranes intact, without ulcers or lesions     NECK: no adenopathy or asymmetry, thyroid normal to palpation     RESP: lungs clear to auscultation - no rales, rhonchi or wheezes     CV: regular rates and rhythm, no murmurs, rubs, or gallop     ABDOMEN:  soft, nontender, no HSM or masses and bowel sounds normal     MS: extremities normal- no gross deformities noted; normal muscle tone.     SKIN: is clammy     NEURO: Normal strength and tone, mentation intact and speech normal     PSYCH: normal thought process; no significant mood disturbance    ECG shows ST with no acute changes as read by this provider.    ASSESSMENT/PLAN:      ICD-10-CM    1. Chest pressure  R07.89 EKG 12-lead complete w/read - Clinics   2. Tachycardia  R00.0       Patient needs monitoring and further evaluation at the ER. Called [facility] and informed about patient.  is driving her straight there.  Follow Up:      Patient Instructions   Go to Emergency Room for future evaluation.      NI Leach, CNP  [facility] Provider

## 2020-06-09 ENCOUNTER — APPOINTMENT (OUTPATIENT)
Dept: CARDIOLOGY | Facility: CLINIC | Age: 70
End: 2020-06-09
Attending: HOSPITALIST
Payer: COMMERCIAL

## 2020-06-09 VITALS
BODY MASS INDEX: 27.67 KG/M2 | OXYGEN SATURATION: 98 % | SYSTOLIC BLOOD PRESSURE: 136 MMHG | TEMPERATURE: 98.8 F | HEIGHT: 67 IN | RESPIRATION RATE: 14 BRPM | HEART RATE: 91 BPM | DIASTOLIC BLOOD PRESSURE: 87 MMHG | WEIGHT: 176.3 LBS

## 2020-06-09 PROBLEM — R07.9 CHEST PAIN: Status: ACTIVE | Noted: 2020-06-09

## 2020-06-09 LAB
GLUCOSE BLDC GLUCOMTR-MCNC: 90 MG/DL (ref 70–99)
GLUCOSE BLDC GLUCOMTR-MCNC: 92 MG/DL (ref 70–99)
INTERPRETATION ECG - MUSE: NORMAL
NT-PROBNP SERPL-MCNC: 30 PG/ML (ref 0–900)
NT-PROBNP SERPL-MCNC: 44 PG/ML (ref 0–900)
SARS-COV-2 RNA SPEC QL NAA+PROBE: NOT DETECTED
SPECIMEN SOURCE: NORMAL
T4 FREE SERPL-MCNC: 1.32 NG/DL (ref 0.76–1.46)
TROPONIN I SERPL-MCNC: <0.015 UG/L (ref 0–0.04)
TSH SERPL DL<=0.005 MIU/L-ACNC: 3.14 MU/L (ref 0.4–4)
TSH SERPL DL<=0.005 MIU/L-ACNC: 4.15 MU/L (ref 0.4–4)

## 2020-06-09 PROCEDURE — 25500064 ZZH RX 255 OP 636: Performed by: HOSPITALIST

## 2020-06-09 PROCEDURE — 83880 ASSAY OF NATRIURETIC PEPTIDE: CPT | Performed by: HOSPITALIST

## 2020-06-09 PROCEDURE — 99220 ZZC INITIAL OBSERVATION CARE,LEVL III: CPT | Performed by: HOSPITALIST

## 2020-06-09 PROCEDURE — 93306 TTE W/DOPPLER COMPLETE: CPT | Mod: 26 | Performed by: INTERNAL MEDICINE

## 2020-06-09 PROCEDURE — 25000132 ZZH RX MED GY IP 250 OP 250 PS 637: Performed by: EMERGENCY MEDICINE

## 2020-06-09 PROCEDURE — 99203 OFFICE O/P NEW LOW 30 MIN: CPT | Mod: 25 | Performed by: INTERNAL MEDICINE

## 2020-06-09 PROCEDURE — 84484 ASSAY OF TROPONIN QUANT: CPT | Performed by: HOSPITALIST

## 2020-06-09 PROCEDURE — 25000132 ZZH RX MED GY IP 250 OP 250 PS 637: Performed by: HOSPITALIST

## 2020-06-09 PROCEDURE — 84443 ASSAY THYROID STIM HORMONE: CPT | Performed by: HOSPITALIST

## 2020-06-09 PROCEDURE — 93306 TTE W/DOPPLER COMPLETE: CPT

## 2020-06-09 PROCEDURE — G0378 HOSPITAL OBSERVATION PER HR: HCPCS

## 2020-06-09 PROCEDURE — 99207 ZZC APP CREDIT; MD BILLING SHARED VISIT: CPT | Performed by: INTERNAL MEDICINE

## 2020-06-09 PROCEDURE — 36415 COLL VENOUS BLD VENIPUNCTURE: CPT | Performed by: HOSPITALIST

## 2020-06-09 PROCEDURE — 00000146 ZZHCL STATISTIC GLUCOSE BY METER IP

## 2020-06-09 RX ORDER — ACETAMINOPHEN 325 MG/1
650 TABLET ORAL EVERY 4 HOURS PRN
Status: DISCONTINUED | OUTPATIENT
Start: 2020-06-09 | End: 2020-06-09 | Stop reason: HOSPADM

## 2020-06-09 RX ORDER — ATORVASTATIN CALCIUM 20 MG/1
20 TABLET, FILM COATED ORAL DAILY
Status: DISCONTINUED | OUTPATIENT
Start: 2020-06-09 | End: 2020-06-09 | Stop reason: HOSPADM

## 2020-06-09 RX ORDER — ATORVASTATIN CALCIUM 20 MG/1
20 TABLET, FILM COATED ORAL AT BEDTIME
COMMUNITY
End: 2020-12-16

## 2020-06-09 RX ORDER — ONDANSETRON 4 MG/1
4 TABLET, ORALLY DISINTEGRATING ORAL EVERY 6 HOURS PRN
Status: DISCONTINUED | OUTPATIENT
Start: 2020-06-09 | End: 2020-06-09 | Stop reason: HOSPADM

## 2020-06-09 RX ORDER — NALOXONE HYDROCHLORIDE 0.4 MG/ML
.1-.4 INJECTION, SOLUTION INTRAMUSCULAR; INTRAVENOUS; SUBCUTANEOUS
Status: DISCONTINUED | OUTPATIENT
Start: 2020-06-09 | End: 2020-06-09 | Stop reason: HOSPADM

## 2020-06-09 RX ORDER — AMLODIPINE BESYLATE 5 MG/1
5 TABLET ORAL AT BEDTIME
COMMUNITY
End: 2020-12-16

## 2020-06-09 RX ORDER — MELOXICAM 15 MG/1
15 TABLET ORAL AT BEDTIME
COMMUNITY
End: 2020-12-16

## 2020-06-09 RX ORDER — AMLODIPINE BESYLATE 5 MG/1
5 TABLET ORAL ONCE
Status: COMPLETED | OUTPATIENT
Start: 2020-06-09 | End: 2020-06-09

## 2020-06-09 RX ORDER — ONDANSETRON 2 MG/ML
4 INJECTION INTRAMUSCULAR; INTRAVENOUS EVERY 6 HOURS PRN
Status: DISCONTINUED | OUTPATIENT
Start: 2020-06-09 | End: 2020-06-09 | Stop reason: HOSPADM

## 2020-06-09 RX ORDER — ACETAMINOPHEN 650 MG/1
650 SUPPOSITORY RECTAL EVERY 4 HOURS PRN
Status: DISCONTINUED | OUTPATIENT
Start: 2020-06-09 | End: 2020-06-09 | Stop reason: HOSPADM

## 2020-06-09 RX ORDER — AMLODIPINE BESYLATE 5 MG/1
5 TABLET ORAL DAILY
Status: DISCONTINUED | OUTPATIENT
Start: 2020-06-09 | End: 2020-06-09 | Stop reason: HOSPADM

## 2020-06-09 RX ORDER — LISINOPRIL/HYDROCHLOROTHIAZIDE 10-12.5 MG
1 TABLET ORAL ONCE
Status: COMPLETED | OUTPATIENT
Start: 2020-06-09 | End: 2020-06-09

## 2020-06-09 RX ORDER — ASPIRIN 81 MG/1
81 TABLET, CHEWABLE ORAL DAILY
Status: DISCONTINUED | OUTPATIENT
Start: 2020-06-09 | End: 2020-06-09 | Stop reason: HOSPADM

## 2020-06-09 RX ORDER — LISINOPRIL/HYDROCHLOROTHIAZIDE 10-12.5 MG
1 TABLET ORAL AT BEDTIME
COMMUNITY
End: 2020-12-16

## 2020-06-09 RX ADMIN — AMLODIPINE BESYLATE 5 MG: 5 TABLET ORAL at 02:48

## 2020-06-09 RX ADMIN — LISINOPRIL AND HYDROCHLOROTHIAZIDE 1 TABLET: 12.5; 1 TABLET ORAL at 02:48

## 2020-06-09 RX ADMIN — ASPIRIN 81 MG 81 MG: 81 TABLET ORAL at 07:56

## 2020-06-09 RX ADMIN — HUMAN ALBUMIN MICROSPHERES AND PERFLUTREN 7 ML: 10; .22 INJECTION, SOLUTION INTRAVENOUS at 13:45

## 2020-06-09 ASSESSMENT — MIFFLIN-ST. JEOR
SCORE: 1357.32
SCORE: 1333.75

## 2020-06-09 NOTE — ED NOTES
Pt requesting night time blood pressure medications. Pt states she normally takes her medications around midnight. MD notified about request and order obtained for pt's night time dose of amlodipine and lisinopril-hydrochlorothiazide.

## 2020-06-09 NOTE — PROGRESS NOTES
"MD at bedside. Ordering cardiology consult. Pt encouraged to eat but reports that she is only thirsty. Denies any nausea but just \"isn't hungry\".   "

## 2020-06-09 NOTE — PROGRESS NOTES
Patient seen and examined.    Chart reviewed.      Please see admission history and physical by Dr. Lees from earlier today for details.

## 2020-06-09 NOTE — PLAN OF CARE
ROOM # 228  Living Situation (if not independent, order SW consult): Home w/ spouse Roney and son, Kelvin. Independent  Facility name:  :    Mihai Zamudio  Spouse  [phone]        Activity level at baseline: Independent  Activity level on admit: Independent    Patient registered to observation; given Patient Bill of Rights; given the opportunity to ask questions about observation status and their plan of care.  Patient has been oriented to the observation room, bathroom and call light is in place.    Discussed discharge goals and expectations with patient/family.

## 2020-06-09 NOTE — CONSULTS
Cardiology consultation dictated    Subjective tachycardia/ decreased exercise tolerance with no objective evidence of pathologic arrhythmia, syncope, typical chest pain, present several months. Current evaluation includes an unremarkable ECG  negative troponin series, TTE showing normal to hyperdynamic LVEF without regional wall motion abnormalities, normal thyroids, hemoglobin, no fever, negative CT scan and continuous monitoring showing no pathologic arrhythmia.. Her only coronary risk factor is hypertension.... she does NOT tolerate beta blockers. Her physical exam is normal She admits to being more sedentary prior to this Spring.     Assessment : I am doubtful of a pathologic arrhythmia. A stress echocardiogram may be helpful to exclude significant exercise associated arrhythmia/ischemia. I offered the option of simply switching her from amlodipine to a beta blocker which might blunt symptoms of a rapid heart rate but she was unreceptive to this idea.    Recommendations  1) Stress echocardiogram. I offered to arrange the test for tomorrow morning, but she would prefer to go home and have the test as an outpatient. I feel discharge would be safe.    Ilana

## 2020-06-09 NOTE — PLAN OF CARE
Patient's After Visit Summary was reviewed with patient.  Patient verbalized understanding of After Visit Summary, recommended follow up and was given an opportunity to ask questions.   Discharge medications sent home with patient/family: No, Not applicable   Discharged with spouse.     IV access removed. All personal belongings returned. Escorted to exit with staff.     OBSERVATION patient END time: 6:30 PM

## 2020-06-09 NOTE — PROGRESS NOTES
Patient admitted Observation status from ER. Pt settled in room, VS & assessment obtained, pt requests to rest.

## 2020-06-09 NOTE — PLAN OF CARE
"PRIMARY DIAGNOSIS: CHEST PAIN  OUTPATIENT/OBSERVATION GOALS TO BE MET BEFORE DISCHARGE:  1. Ruled out acute coronary syndrome (negative or stable Troponin):  Yes  2. Pain Status: Pain free.  3. Appropriate provocative testing performed: Yes  - Stress Test Procedure: Echo  - Interpretation of cardiac rhythm per telemetry tech: NSR, HR in 60s.     4. Cleared by Consultants (if applicable):Yes  5. Return to near baseline physical activity: Yes  Discharge Planner Nurse   Safe discharge environment identified: Yes  Barriers to discharge: No       Entered by: Willow Shepherd 06/09/2020 4:00 PM     /87 (BP Location: Right arm)   Pulse 91   Temp 98.8  F (37.1  C) (Oral)   Resp 14   Ht 1.702 m (5' 7\")   Wt 80 kg (176 lb 4.8 oz)   LMP 07/27/2009   SpO2 98%   BMI 27.61 kg/m    A&Ox4. CMS intact. Denies pain. Denies SOB. ECHO completed. Cardiology consulted - see note. BS active x4, tolerating regular diet, passing flatus. Voiding in adequate amt's. IV access discontinued. Plan is to discharge to home w/ spouse providing transportation. Will continue to monitor.   Please review provider order for any additional goals.   Nurse to notify provider when observation goals have been met and patient is ready for discharge.    "

## 2020-06-09 NOTE — ED NOTES
"[facility]  ED Nurse Handoff Report    Yandy Zamudio is a 70 year old female   ED Chief complaint: Palpitations  . ED Diagnosis:   Final diagnoses:   Chest pain, unspecified type   Palpitations   Dyspnea, unspecified type     Allergies:   Allergies   Allergen Reactions     Pravastatin      Myalgias       Trazodone      excess fatigue       Code Status: Full Code  Activity level - Baseline/Home:  Stand by Assist. Activity Level - Current:   Stand by Assist. Lift room needed: No. Bariatric: No   Needed: No   Isolation: Yes. Infection: Other: COVID PUI    Vital Signs:   Vitals:    06/09/20 0030 06/09/20 0045 06/09/20 0100 06/09/20 0115   BP: (!) 159/85 (!) 159/84 (!) 155/75 (!) 143/69   Pulse: 82 84 86 86   Resp:       Temp:       TempSrc:       SpO2: 99% 98% 99% 97%       Cardiac Rhythm:  ,   Cardiac  Cardiac Rhythm: Normal sinus rhythm(Pt states she feels \"skipping beats\")  Pain level:    Patient confused: No. Patient Falls Risk: No.   Elimination Status: Has voided   Patient Report - Initial Complaint: Patient has been winded the past week with activity, and this morning woke up having heart palpitations that have been intermittent but got better after coffee this morning. ABCs intact. Focused Assessment:Cardiac - Cardiac WDL: -WDL except; all (Denies chest pain) Cardiac Rhythm: tachycardic (Pt reported tachycardia on/off for the last few weeks. 95 bpm currently.)  Cardiac Monitoring - EKG Monitoring: Yes  Cardiac Regularity: Regular  Cardiac Rhythm: NSR (Pt states she feels \"skipping beats\")   Tests Performed:   CT Chest Pulmonary Embolism w Contrast   Final Result   IMPRESSION:   1.  No evidence for pulmonary emboli.   2.  No evidence for acute pulmonary disease.   3.  Calcified granulomas and linear fibroatelectasis left lower lobe.        . Abnormal Results:   Labs Ordered and Resulted from Time of ED Arrival Up to the Time of Departure from the ED   CBC WITH PLATELETS DIFFERENTIAL - " Abnormal; Notable for the following components:       Result Value    RBC Count 5.91 (*)     Hemoglobin 16.8 (*)     Hematocrit 52.4 (*)     All other components within normal limits   BASIC METABOLIC PANEL   TROPONIN I   D DIMER QUANTITATIVE   COVID-19 VIRUS (CORONAVIRUS) BY PCR   PERIPHERAL IV CATHETER     .   Treatments provided: See MAR.  Family Comments: Pt called family.  OBS brochure/video discussed/provided to patient:  No  ED Medications:   Medications   CT Scan Flush (81 mLs Intravenous Given 6/8/20 2244)   iopamidol (ISOVUE-370) solution 500 mL (62 mLs Intravenous Given 6/8/20 2244)     Drips infusing:  No  For the majority of the shift, the patient's behavior Green. Interventions performed were NA.    Sepsis treatment initiated: No       ED Nurse Name/Phone Number: Nicole Collette, RN,   11:27 PM

## 2020-06-09 NOTE — PHARMACY-ADMISSION MEDICATION HISTORY
Admission medication history interview status for this patient is complete. See [facility] admission navigator for allergy information, prior to admission medications and immunization status.     Medication history interview done via telephone during Covid-19 pandemic, indicate source(s): Patient  Medication history resources (including written lists, pill bottles, clinic record):None    Changes made to PTA medication list:  Added: none  Deleted: none  Changed: none    Actions taken by pharmacist (provider contacted, etc):None     Additional medication history information:None    Medication reconciliation/reorder completed by provider prior to medication history?  Y   (Y/N)     For patients on insulin therapy: N  (Y/N)      Prior to Admission medications    Medication Sig Last Dose Taking? Auth Provider   amLODIPine (NORVASC) 5 MG tablet Take 5 mg by mouth At Bedtime 6/9/2020 at 0300 Yes Unknown, Entered By History   atorvastatin (LIPITOR) 20 MG tablet Take 20 mg by mouth At Bedtime 6/7/2020 at pm Yes Unknown, Entered By History   Biotin 5000 MCG CAPS Take 5,000 mcg by mouth daily  Yes Reported, Patient   DULoxetine (CYMBALTA) 30 MG capsule TAKE THREE CAPSULES BY MOUTH ONCE DAILY IN THE MORNING  Yes Omid Rivera MD   lisinopril-hydrochlorothiazide (ZESTORETIC) 10-12.5 MG tablet Take 1 tablet by mouth At Bedtime 6/7/2020 at pm Yes Unknown, Entered By History   meloxicam (MOBIC) 15 MG tablet Take 15 mg by mouth At Bedtime 6/7/2020 at pm Yes Unknown, Entered By History   multivitamin, therapeutic with minerals (MULTI-VITAMIN) TABS tablet Take 1 tablet by mouth daily  Yes Reported, Patient

## 2020-06-09 NOTE — PLAN OF CARE
OT/PT Orders received, chart reviewed, pt admitted due to concerns of being SOB the past few weeks, tachycardiac and chest pain. Note COVID r/o with symptoms. Per medical team recommendation will hold OT/PT evals at this time while pt being r/o for COVID. Note plan for echo and further medical workup today as well.

## 2020-06-09 NOTE — ED TRIAGE NOTES
Patient has been winded the past week with activity, and this morning woke up having heart palpitations that have been intermittent but got better after coffee this morning. ABCs intact.

## 2020-06-09 NOTE — ED PROVIDER NOTES
History     Chief Complaint:  Palpitations     The history is provided by the patient.      Yandy Zamudio is a 70 year old female who presents with palpitations and dyspnea.  She says that about 3 weeks ago she began to note that she would become unusually winded with activity.  She has also noted that she becomes unusually tachycardic with activity.  She has a pulse oximeter at home and says that with just minimal activity her heart rate will go up 30 points.  She has not had desaturations.  She says today just getting out of the shower her heart went up to 145 bpm.  This morning for the first time she had an episode of chest pain.  She describes it as a pressure-like pain in the central chest that lasted about 15 minutes.  She was worried because a relative was recently evaluated for similar symptoms and told she had serious blockage and needed immediate angiography.  She has not had any cough or fever.  She has had unusual fatigue and says she wondered whether she had an atypical COVID infection.    Allergies:  Pravastatin  Trazodone     Medications:    amlodipine  atorvastatin   duloxetine  lisinopril-hydrochlorothiazide  meloxicam    Past Medical History:    Fibromyalgia  Hyperlipidemia  Skin cancer  Hypertension   Anxiety  Depression     Past Surgical History:    C section x 2  Left femur bone growth  Tonsillectomy and adenoidectomy   Left breast biopsy    Family History:    Father - hypertension, coronary artery disease, bladder cancer, skin cancer  Mother - COPD  Brother - skin cancer, cerebrovascular disease    Social History:  Presents alone.   Tobacco use: negative   Alcohol use: occasionally   PCP: Omid Rivera     Review of Systems   All other systems reviewed and are negative.      Physical Exam     Patient Vitals for the past 24 hrs:   BP Temp Temp src Pulse Heart Rate Resp SpO2   06/08/20 2115 (!) 186/106 -- -- 100 96 -- 100 %   06/08/20 1919 (!) 169/104 97.7  F (36.5  C) Oral 115 -- 18 99 %         Physical Exam  Constitutional:       Comments: Pleasant and cooperative   HENT:      Right Ear: Tympanic membrane normal.      Left Ear: Tympanic membrane normal.      Mouth/Throat:      Pharynx: No posterior oropharyngeal erythema.   Eyes:      Conjunctiva/sclera: Conjunctivae normal.   Neck:      Musculoskeletal: Neck supple.   Cardiovascular:      Rate and Rhythm: Normal rate and regular rhythm.      Heart sounds: Normal heart sounds.   Pulmonary:      Effort: Pulmonary effort is normal.      Breath sounds: Normal breath sounds.   Abdominal:      General: Bowel sounds are normal. There is no distension.      Palpations: Abdomen is soft.      Tenderness: There is no abdominal tenderness. There is no guarding or rebound.   Musculoskeletal: Normal range of motion.   Skin:     General: Skin is warm and dry.   Neurological:      Mental Status: She is alert.           Emergency Department Course     ECG:  Indication: palpitations  Time: 1929  Vent. rate 103 BPM  OR interval 130 ms  QRS duration 90 ms  QT/QTc 346/453 ms  P-R-T axes 71 42 95  Sinus tachycardia  Right atrial enlargement  Borderline ECG  When compared with ECG of 17-JUL-2010 13:41,  Vent. rate has increased BY 36 BPM  Nonspecific T wave abnormality now evident in Lateral leads    Imaging:  Radiology findings were communicated with the patient who voiced understanding of the findings.    CT Chest Pulmonary Embolism w Contrast    (Results Pending)      Laboratory:  Laboratory findings were communicated with the patient who voiced understanding of the findings.    CBC: WBC 9.7, HGB 16.8 (H),    BMP: AWNL (Creatinine 0.72)    1925 Troponin: <0.015   D dimer quantitative: <0.3    Symptomatic COVID-19 Virus (Coronavirus) by PCR Nasopharyngeal swab: pending     Emergency Department Course:  Past medical records, nursing notes, and vitals reviewed.    2051 I performed an exam of the patient as documented above.     EKG obtained in the ED, see results  above.   IV was inserted and blood was drawn for laboratory testing, results above.  Nasal swab sent to laboratory for testing, results above.    The patient was sent for a chest CT while in the emergency department, results above.     2300 Patient rechecked and updated.      I personally reviewed the laboratory, EKG, and imaging results with the Patient and answered all related questions prior to admission.     Impression & Plan         HEART Score  Background  Calculates the overall risk of adverse event in patient's presenting with chest pain.  Based on 5 criteria (each assigned 0-2 points) including suspiciousness of history, EKG, age, risk factors and troponin.    Data  70 year old female  has Anxiety state; Essential hypertension; iamSPONDYLOLISTHESIS; Colon polyps; Obesity; HYPERLIPIDEMIA LDL GOAL <130; Advanced directives, counseling/discussion; Blood glucose elevated; Fibromyalgia; Greater trochanteric bursitis of right hip; and Major depressive disorder, single episode, mild (H) on their problem list.   reports that she has never smoked. She has never used smokeless tobacco.  family history includes C.A.D. in her father; Cancer in her father; Cerebrovascular Disease in her brother; Hypertension in her father; Respiratory in her mother; Skin Cancer in her brother and father.  Lab Results   Component Value Date    TROPI <0.015 06/08/2020     Criteria   0-2 points for each of 5 items (maximum of 10 points):  Score 1- History moderately suspicious for coronary syndrome  Score 1- EKG with Non-specific repolarization disturbance  Score 2- Age 65 years or older  Score 1- One to 2 risk factors for atherosclerotic disease  Score 0- Within normal limits for troponin levels  Interpretation  Risk of adverse outcome  Heart Score: 5  Total Score 4-6- Adverse Outcome Risk 20.3% - Supports admission with standard rule-out management -serial troponins and stress testing      Medical Decision Making:  Yandy Zamudio is a  70 year old female who presents to the emergency department today with shortness of breath, frequent tachycardia, and then an episode of chest pain this morning.  Evaluation here is unremarkable.  She has been in normal sinus rhythm.  Troponin is negative.  Given her progressive dyspnea I ordered chest CT but this is unremarkable.  Heart score is 5.  I feel she would benefit from admission to observation for provocative cardiac testing.  Dr. Lees has accepted the patient.      Discharge Diagnosis:  No diagnosis found.    Disposition:  Admitted.    Discharge Medications:  New Prescriptions    No medications on file       Scribe Disclosure:  I, Dang Blanco, am serving as a scribe at 8:55 PM on 6/8/2020 to document services personally performed by Milly Saenz MD based on my observations and the provider's statements to me.       Milly Saenz MD  06/08/20 [id number]

## 2020-06-09 NOTE — H&P
[facility]    History and Physical  Hospitalist     Date of Admission:  6/8/2020  Date of Service (when I saw the patient): 06/09/20  Provider: Bran Lees MD      Chief Complaint   Palpitations and shortness of breath on exertion    History is obtained from the patient, electronic health record and emergency department physician    History of Present Illness   Yandy Zamudio is a 70 year old female who has a history of essential hypertension, hyperlipidemia, depression and other health problems as listed below.  She presents with palpitations that she had been suffering for 3 or 4 weeks, she is also having dyspnea on exertion.  Today early in the morning the palpitation woke her up and they lasted for more than an hour according to the patient.  She does not have any specific chest pain but just a tightness sensation.  No near fainting or fainting episode.  No sweatiness, no nausea or vomiting.  No fever or other symptoms.  Laboratory results significant for:  CBC with differential significant for hemoglobin of 16.8 and hematocrit of 52.4, other values are normal.  Chemistry within normal limit  Glycemia 91.  D-dimer <0.3  Troponin IES <0.015  CT of the chest with PE protocol.  IMPRESSION:  1.  No evidence for pulmonary emboli.  2.  No evidence for acute pulmonary disease.  3.  Calcified granulomas and linear fibroatelectasis left lower lobe.  EKG sinus tachycardia.      Past Medical History    I have reviewed this patient's medical history and updated it with pertinent information if needed.   Past Medical History:   Diagnosis Date     Blood glucose elevated 4/27/2014     Cataract      Colon polyps 1/08    Tubulovillous adenoma. 4 mm polyp in the ascending col     Diplopia      Fibromyalgia 10/12/2014     Hyperlipidemia LDL goal <130 10/31/2010     Major depression      Menopause 2008    rare hot flash     Myalgias      Skin cancer      Unspecified essential hypertension         Assessment &  Lior Zamudio is a 70 year old female who presents with complaint of frequent palpitation, dyspnea on exertion, chest tightness.  She has a history of essential hypertension, hyperlipidemia and she is menopause.  Denies smoking or diabetes.    1.  Palpitations.  She has sinus tachycardia in the EKG.  Normal troponin, negative CT with PE protocol.  2.  Atypical chest pain.  Appears very low yield ACS/angina.  Plan is complete serial troponin overnight obtain echo in the morning.  3.  Dyspnea on exertion.  No pulmonary embolism, no pleural pulmonary disease per CT report.  Echo is still pending performance, and going to check NT proBNP.  4.  Essential hypertension.  Continue home medication  5.  Hyperlipidemia.  Continue home medication  6.  Major depressive disorder.  Continue home medication    Plan.  Observation.  Cardiac diet no caffeine.  Cardiac telemetry.  Complete serial troponin.  Obtain echocardiogram in the morning.  Check TSH.  Resume Cymbalta, lisinopril, hydrochlorothiazide, amlodipine, and atorvastatin when reconciliation is completed.  Aspirin 81 mg daily.      Code Status   Full Code    Primary Care Physician   Omid Rivera      Past Surgical History   I have reviewed this patient's surgical history and updated it with pertinent information if needed.  Past Surgical History:   Procedure Laterality Date     C NONSPECIFIC PROCEDURE       x2     C NONSPECIFIC PROCEDURE      Left femur bone growth     C NONSPECIFIC PROCEDURE      S/P T&A     C NONSPECIFIC PROCEDURE  1980s    Breast Biopsy left       Prior to Admission Medications   Prior to Admission Medications   Prescriptions Last Dose Informant Patient Reported? Taking?   Biotin 5000 MCG CAPS   Yes No   Sig: Take 5,000 mcg by mouth daily   DULoxetine (CYMBALTA) 30 MG capsule   No No   Sig: TAKE THREE CAPSULES BY MOUTH ONCE DAILY IN THE MORNING   amLODIPine (NORVASC) 5 MG tablet   No No   Sig: Take 1 tablet (5 mg) by mouth daily    atorvastatin (LIPITOR) 20 MG tablet   No No   Sig: Take 1 tablet (20 mg) by mouth daily   lisinopril-hydrochlorothiazide (PRINZIDE/ZESTORETIC) 10-12.5 MG tablet   No No   Sig: TAKE 1 TABLET BY MOUTH ONCE DAILY IN THE MORNING   meloxicam (MOBIC) 15 MG tablet   No No   Sig: Take 1 tablet (15 mg) by mouth daily   multivitamin, therapeutic with minerals (MULTI-VITAMIN) TABS tablet   Yes No   Sig: Take 1 tablet by mouth daily      Facility-Administered Medications Last Administration Doses Remaining   methylPREDNISolone acetate (DEPO-MEDROL) injection 40 mg 10/6/2018 10:08 AM         Allergies   Allergies   Allergen Reactions     Pravastatin      Myalgias       Trazodone      excess fatigue       Social History   I have personally reviewed the social history with the patient showing.  Social History     Tobacco Use     Smoking status: Never Smoker     Smokeless tobacco: Never Used   Substance Use Topics     Alcohol use: Yes     Comment: once in a while, small amount     Family History   I have reviewed this patient's family history and it is not contributory to the admission .       Review of Systems   Except as noted in the HPI, a 12-system Review of Systems was found to be negative.        Physical Exam   Vital Signs with Ranges  Temp:  [97.7  F (36.5  C)-99.3  F (37.4  C)] 97.7  F (36.5  C)  Pulse:  [] 86  Heart Rate:  [83-96] 90  Resp:  [18-20] 18  BP: (143-186)/() 143/69  SpO2:  [97 %-100 %] 97 %  0 lbs 0 oz    GEN:  Alert, oriented x 3, appears comfortable, NAD.  HEENT:  Normocephalic/atraumatic, no scleral icterus, no nasal discharge, mouth moist.  CV:  Regular rate and rhythm, no murmur or JVD.  S1 + S2 noted, no S3 or S4.  LUNGS:  Clear to auscultation bilaterally without rales/rhonchi/wheezing/retractions.  Symmetric chest rise on inhalation noted.  ABD:  Active bowel sounds, soft, non-tender/non-distended.  No rebound/guarding/rigidity.  EXT:  No edema or cyanosis.  No joint synovitis  noted.  SKIN:  Dry to touch, no exanthems noted in the visualized areas.       Data   I personally reviewed the EKG tracing showing Sinus tachycardia .  Results for orders placed or performed during the hospital encounter of 06/08/20 (from the past 24 hour(s))   CBC with platelets differential   Result Value Ref Range    WBC 9.7 4.0 - 11.0 10e9/L    RBC Count 5.91 (H) 3.8 - 5.2 10e12/L    Hemoglobin 16.8 (H) 11.7 - 15.7 g/dL    Hematocrit 52.4 (H) 35.0 - 47.0 %    MCV 89 78 - 100 fl    MCH 28.4 26.5 - 33.0 pg    MCHC 32.1 31.5 - 36.5 g/dL    RDW 12.4 10.0 - 15.0 %    Platelet Count 292 150 - 450 10e9/L    Diff Method Automated Method     % Neutrophils 68.9 %    % Lymphocytes 21.6 %    % Monocytes 6.6 %    % Eosinophils 2.1 %    % Basophils 0.6 %    % Immature Granulocytes 0.2 %    Nucleated RBCs 0 0 /100    Absolute Neutrophil 6.7 1.6 - 8.3 10e9/L    Absolute Lymphocytes 2.1 0.8 - 5.3 10e9/L    Absolute Monocytes 0.6 0.0 - 1.3 10e9/L    Absolute Eosinophils 0.2 0.0 - 0.7 10e9/L    Absolute Basophils 0.1 0.0 - 0.2 10e9/L    Abs Immature Granulocytes 0.0 0 - 0.4 10e9/L    Absolute Nucleated RBC 0.0    Basic metabolic panel   Result Value Ref Range    Sodium 136 133 - 144 mmol/L    Potassium 3.6 3.4 - 5.3 mmol/L    Chloride 102 94 - 109 mmol/L    Carbon Dioxide 28 20 - 32 mmol/L    Anion Gap 6 3 - 14 mmol/L    Glucose 91 70 - 99 mg/dL    Urea Nitrogen 15 7 - 30 mg/dL    Creatinine 0.72 0.52 - 1.04 mg/dL    GFR Estimate 85 >60 mL/min/[1.73_m2]    GFR Estimate If Black >90 >60 mL/min/[1.73_m2]    Calcium 9.2 8.5 - 10.1 mg/dL   Troponin I   Result Value Ref Range    Troponin I ES <0.015 0.000 - 0.045 ug/L   D dimer quantitative   Result Value Ref Range    D Dimer <0.3 0.0 - 0.50 ug/ml FEU   EKG 12-lead, tracing only   Result Value Ref Range    Interpretation ECG Click View Image link to view waveform and result    CT Chest Pulmonary Embolism w Contrast    Narrative    EXAM: CT CHEST PULMONARY EMBOLISM W  CONTRAST  LOCATION: [facility]  DATE/TIME: 6/8/2020 10:42 PM    INDICATION: Dyspnea. Tachycardia.  COMPARISON: None.  TECHNIQUE: CT chest pulmonary angiogram during arterial phase injection of IV contrast. Multiplanar reformats and MIP reconstructions were performed. Dose reduction techniques were used.   CONTRAST: 62mL Isovue-370    FINDINGS:  ANGIOGRAM CHEST: Pulmonary arteries are normal caliber and negative for pulmonary emboli. Thoracic aorta is negative for dissection. No CT evidence of right heart strain.    LUNGS AND PLEURA: Calcified granulomas left lower lobe. Scarring or atelectasis left base. No acute infiltrates or pleural effusions.    MEDIASTINUM/AXILLAE: A few tiny calcified left mediastinal nodes.    UPPER ABDOMEN: Normal.    MUSCULOSKELETAL: Normal.      Impression    IMPRESSION:  1.  No evidence for pulmonary emboli.  2.  No evidence for acute pulmonary disease.  3.  Calcified granulomas and linear fibroatelectasis left lower lobe.       Disclaimer: This note consists of symbols derived from keyboarding, dictation and/or voice recognition software. As a result, there may be errors in the script that have gone undetected. Please consider this when interpreting information found in this chart.

## 2020-06-09 NOTE — PROGRESS NOTES
Pt resting, denies any forms of pain or discomfort. Meal encouraged but pt declines. Pt denies any sensation of Palpitations as she felt when she was at home. Awaiting for ECHO.

## 2020-06-09 NOTE — DISCHARGE SUMMARY
[facility]  Hospitalist Discharge Summary      Date of Admission:  6/8/2020  Date of Discharge:  6/9/2020  Discharging Provider: Les Domingo DO      Discharge Diagnoses   1.  Palpitations.  Have been present for several weeks.  Was directed to emergency room by her primary care clinic.  Echocardiogram shows no significant abnormalities.  Telemetry showedsignificant abnormalities.  Was seen in consultation by cardiology.  Is recommended that she undergo outpatient exercise stress echocardiogram.  Follow-up with primary care provider in outpatient setting after stress echocardiogram performed.  She did have a SARS-CoV-2 PCR performed during hospital stay which was negative.    2.  Hypertension.  Continue amlodipine, lisinopril, hydrochlorothiazide.    3.  Hyperlipidemia.  Continue atorvastatin.    Follow-ups Needed After Discharge   Follow-up Appointments     Follow-up and recommended labs and tests       Follow up with primary care provider, Omid Rivera, within 7 days for   hospital follow- up.  The following labs/tests are recommended: Exercise   stress echocardiogram.             Discharge Disposition   Discharged to home  Condition at discharge: Stable      Consultations This Hospital Stay   PHYSICAL THERAPY ADULT IP CONSULT  OCCUPATIONAL THERAPY ADULT IP CONSULT  CARDIOLOGY IP CONSULT    Code Status   Full Code    Time Spent on this Encounter   I spent 20 minutes with Ms. Zamudio and working on discharge on 6/9/2020.       Les Domingo DO  [facility]  ______________________________________________________________________    Physical Exam   Vital Signs: Temp: 98.8  F (37.1  C) Temp src: Oral BP: 136/87 Pulse: 91 Heart Rate: 75 Resp: 14 SpO2: 98 % O2 Device: None (Room air)    Weight: 176 lbs 4.8 oz  Gen:  NAD, A&Ox3.  Eyes:  PERRL, sclera anicteric.  OP:  MMM, no lesions.  Neck:  Supple.  CV:  Regular, no murmurs.  Lung:  CTA b/l, normal effort.  Ab:  +BS, soft.  Skin:   Warm, dry to touch.  No rash.  Ext:  No pitting edema LE b/l.         Primary Care Physician   Omid Rivera    Discharge Orders      Reason for your hospital stay    palpitations     Follow-up and recommended labs and tests     Follow up with primary care provider, Omid Rivera, within 7 days for hospital follow- up.  The following labs/tests are recommended: Exercise stress echocardiogram.     Activity    Your activity upon discharge: activity as tolerated     Full Code     Echo Stress Echocardiogram    Administration of IV contrast will be tailored to this examination per the appropriate written protocol listed in the Echocardiography department Protocol Book, or by the supervising Cardiologist. This may result in an order change.    Use of contrast is at the discretion of the supervising Cardiologist.     Diet    Follow this diet upon discharge: Cardiac         Discharge Medications   Current Discharge Medication List      CONTINUE these medications which have NOT CHANGED    Details   amLODIPine (NORVASC) 5 MG tablet Take 5 mg by mouth At Bedtime      atorvastatin (LIPITOR) 20 MG tablet Take 20 mg by mouth At Bedtime      lisinopril-hydrochlorothiazide (ZESTORETIC) 10-12.5 MG tablet Take 1 tablet by mouth At Bedtime      meloxicam (MOBIC) 15 MG tablet Take 15 mg by mouth At Bedtime      Biotin 5000 MCG CAPS Take 5,000 mcg by mouth daily      DULoxetine (CYMBALTA) 30 MG capsule TAKE THREE CAPSULES BY MOUTH ONCE DAILY IN THE MORNING  Qty: 270 capsule, Refills: 3    Comments: Profile only. Pt doesn't require refill at this time  Associated Diagnoses: Major depressive disorder, recurrent, severe without psychotic features (H)      multivitamin, therapeutic with minerals (MULTI-VITAMIN) TABS tablet Take 1 tablet by mouth daily           Allergies   Allergies   Allergen Reactions     Pravastatin      Myalgias       Trazodone      excess fatigue

## 2020-06-10 ENCOUNTER — TELEPHONE (OUTPATIENT)
Dept: INTERNAL MEDICINE | Facility: CLINIC | Age: 70
End: 2020-06-10

## 2020-06-10 NOTE — CONSULTS
Consult Date:  06/09/2020      HISTORY OF PRESENT ILLNESS:  Yandy Zamudio, a 70-year-old woman with hypertension, dyslipidemia and anxiety/depression, was evaluated in consultation at the request of Dr. Domingo for a 3 week history of exertional dyspnea and tachycardia.      Ms. Zamudio admits that she has been less active over the last winter before this spring.  Over the last 3 weeks, the patient indicates she is more short of breath with activities, and her heart rate seems to increase rapidly with exercise.  At times, she has had palpitations associated with vague chest tightness.  Because she awoke with palpitations, she presented to our ER and was admitted for observation.      Her evaluation thus far has included normal troponin levels, normal thyroid function studies and a hemoglobin of 16.8.  Echocardiography shows an ejection fraction of 65%-70% with no regional wall motion abnormalities and a hyperdynamic systolic performance.  ECG shows sinus rhythm with mild nonspecific ST-T wave changes.  Monitoring thus far has shown no evidence of pathologic tachycardia.  Cardiology consultation has been requested.      The patient admits to being anxious about heart disease, as she had a sister who had a myocardial infarction and underwent stenting.      Ms. Zamudio's risk factors include dyslipidemia and hypertension, but no previous MI, family history of premature coronary artery disease, diabetes mellitus or smoking.      ALLERGIES:  The patient describes myalgias on pravastatin and trazodone causing excessive fatigue.  She also told me she was intolerant of beta blockers.      FAMILY HISTORY:  The patient states that her sister had coronary artery disease.  There is no other structural heart disease in the family.      HABITS:  The patient does not abuse tobacco or alcohol.  She does not abuse illicit drugs.      MEDICATIONS:   1.  Amlodipine 5 mg daily.   2.  Atorvastatin 20 mg daily.   3.   Lisinopril/hydrochlorothiazide 10/12.5 daily.     4.  Meloxicam as needed for arthritis.     5.  Duloxetine 90 mg daily.      REVIEW OF SYSTEMS:  Her 12 point review of systems was negative, and she specifically denied fever, hemoptysis, purulent sputum or pleuritic chest pain.      PHYSICAL EXAMINATION:   GENERAL:  A very pleasant, intelligent, 70-year-old woman.   VITAL SIGNS:  Her blood pressure is 136/87.  Her heart rate is 91.  Respiratory rate is 14.  Oxygen sats are 98%.   LUNGS:  Clear to percussion and auscultation.   CARDIOVASCULAR:  Normal S1 with a normal S2.  There is no S3.  There is no murmur, rub or click.  Her pulses are symmetrical in the carotid, radial, brachial, femoral, popliteal, dorsalis pedis and posterior tibials.   ABDOMEN:  Bowel sounds are present in all 4 quadrants.  Liver percusses to 7 cm.  Spleen is not palpable.  The aorta is not tender or enlarged.   EXTREMITIES:  There is no swelling, cyanosis or clubbing.   NEUROLOGIC:  Cranial nerves II-XII are intact.  Strength is equal and symmetrical.  She displays normal insight and judgment.      LABORATORY STUDIES:  I have personally reviewed her echo, which shows a normal ejection fraction with no regional wall motion abnormalities.  Her aorta is of normal size.  Her CT scan was negative for PE.  Troponins are negative.  Her thyroid function studies and hemoglobin are normal.      ASSESSMENT:  Thus far, there has been no documentation of pathologic tachycardia or cardiac structural abnormality as a cause  her decreased exercise tolerance and rapid heart rate.  The patient admits she has been sedentary and deconditioning is a possibility.   A stress echo maybe helpful to exclude significant exercise associated arrhythmia and/or structural heart disease.   I have offered to arrange the stress echocardiogram for tomorrow while she is in the the hospital, but she would prefer to go home and come back to have it done as an outpatient.  I  believe this is a safe option.      RECOMMENDATIONS:   1.  Stress echocardiogram.   2.  If the stress echo is negative for ischemia or pathologic tachycardia, I would recommend a regular exercise program and weight loss to improve physical conditioning. .      We have appreciated the opportunity to be involved in the care of your patient Yandy Candelario.         YOHANNES SOSA MD             D: 2020   T: 06/10/2020   MT: savana      Name:     YANDY CANDELARIO   MRN:      [id number]        Account:       [id number]   :      1950           Consult Date:  2020      Document: [id number]

## 2020-06-11 NOTE — TELEPHONE ENCOUNTER
"          BP Readings from Last 6 Encounters:   06/09/20 136/87   06/08/20 (!) 164/78   12/13/19 128/76   04/18/19 130/77   01/17/19 135/76   12/18/18 131/81     Hospital/TCU/ED for chronic condition Discharge Protocol    \"Hi, my name is Vibha Quinteros RN, a registered nurse, and I am calling from [facility].  I am calling to follow up and see how things are going for you after your recent emergency visit/hospital/TCU stay.\"    Tell me how you are doing now that you are home?\" Slept really well now at home.  Another episode of fluttering last night.  But all of her Tests normal in hospital (according to the cardiologist).      Discharge Instructions    \"Let's review your discharge instructions.  What is/are the follow-up recommendations?  Pt. Response: Discharged from [facility] on Tues 6/9.  Follow up with primary care provider, Omid Rivera, within 7 days for   hospital follow- up.  The following labs/tests are recommended: Exercise   stress echocardiogram.    \"Has an appointment with your primary care provider been scheduled?\"   Pt is going to schedule stress echo 1st, and then schedule f/u with Dr. Rivera 1-2 days following stress echo test to go over results.    \"When you see the provider, I would recommend that you bring your medications with you.\"    Medications    \"Tell me what changed about your medicines when you discharged?\"    Changes to chronic meds?    0-1    \"What questions do you have about your medications?\"    None     New diagnoses of heart failure, COPD, diabetes, or MI?    No              Post Discharge Medication Reconciliation Status: discharge medications reconciled, continue medications without change.    Was MTM referral placed (*Make sure to put transitions as reason for referral)?   No    Call Summary    \"What questions or concerns do you have about your recent visit and your follow-up care?\"     none    \"If you have questions or things don't continue to improve, we encourage " "you contact us through the main clinic number (give number).  Even if the clinic is not open, triage nurses are available 24/7 to help you.     We would like you to know that our clinic has extended hours (provide information).  We also have urgent care (provide details on closest location and hours/contact info)\"      \"Thank you for your time and take care!\"             "

## 2020-06-14 ENCOUNTER — MYC MEDICAL ADVICE (OUTPATIENT)
Dept: INTERNAL MEDICINE | Facility: CLINIC | Age: 70
End: 2020-06-14

## 2020-06-15 ENCOUNTER — NURSE TRIAGE (OUTPATIENT)
Dept: INTERNAL MEDICINE | Facility: CLINIC | Age: 70
End: 2020-06-15

## 2020-06-15 ENCOUNTER — HOSPITAL ENCOUNTER (OUTPATIENT)
Dept: CARDIOLOGY | Facility: CLINIC | Age: 70
Discharge: HOME | End: 2020-06-15
Attending: INTERNAL MEDICINE | Admitting: INTERNAL MEDICINE
Payer: COMMERCIAL

## 2020-06-15 DIAGNOSIS — R00.2 PALPITATIONS: ICD-10-CM

## 2020-06-15 PROCEDURE — 93321 DOPPLER ECHO F-UP/LMTD STD: CPT | Mod: TC

## 2020-06-15 PROCEDURE — 93016 CV STRESS TEST SUPVJ ONLY: CPT | Performed by: INTERNAL MEDICINE

## 2020-06-15 PROCEDURE — 25500064 ZZH RX 255 OP 636: Performed by: INTERNAL MEDICINE

## 2020-06-15 PROCEDURE — 93018 CV STRESS TEST I&R ONLY: CPT | Performed by: INTERNAL MEDICINE

## 2020-06-15 PROCEDURE — 93350 STRESS TTE ONLY: CPT | Mod: 26 | Performed by: INTERNAL MEDICINE

## 2020-06-15 PROCEDURE — 93325 DOPPLER ECHO COLOR FLOW MAPG: CPT | Mod: 26 | Performed by: INTERNAL MEDICINE

## 2020-06-15 PROCEDURE — 93321 DOPPLER ECHO F-UP/LMTD STD: CPT | Mod: 26 | Performed by: INTERNAL MEDICINE

## 2020-06-15 RX ADMIN — HUMAN ALBUMIN MICROSPHERES AND PERFLUTREN 3 ML: 10; .22 INJECTION, SOLUTION INTRAVENOUS at 15:30

## 2020-06-15 NOTE — TELEPHONE ENCOUNTER
"RN called back to pt.    Pt states she is currently in waiting room to get echo.    States she has been having palpitations for years. Last night they were more bothersome to the rate which she became worried.    Pt is thinking these are PACs or PVCs. She is mostly wondering what could be causing her to have these. Her non-stress echo was \"normal\".    Pt has 12 oz coffee each morning, chocolate daily, no smoking, occasional drinker- 1 drink a few times a week. Did not drink alcohol yesterday. Generally stays very well hydrated.     States she has had bad experiences with cardiology, but will try a referral.    Provider please advise- pt scheduled in for virtual visit on 06/18. She is currently waiting for her stress echo. Would you like to continue with virtual visit or should pt just get cards referral?    Additional Information    Negative: Passed out (i.e., fainted, collapsed and was not responding)    Negative: Shock suspected (e.g., cold/pale/clammy skin, too weak to stand, low BP, rapid pulse)    Negative: Difficult to awaken or acting confused (e.g., disoriented, slurred speech)    Negative: Visible sweat on face or sweat dripping down face    Negative: Unable to walk, or can only walk with assistance (e.g., requires support)    Negative: Received SHOCK from implantable cardiac defibrillator and has persisting symptoms (i.e., palpitations, lightheadedness)    Negative: Sounds like a life-threatening emergency to the triager    Negative: Chest pain    Negative: Difficulty breathing    Negative: Dizziness, lightheadedness, or weakness    Negative: Heart beating very rapidly (e.g., > 140 / minute) and present now (EXCEPTION: during exercise)    Negative: Heart beating very slowly (e.g., < 50 / minute) (EXCEPTION: athlete)    Negative: New or worsened shortness of breath with activity (dyspnea on exertion)    Negative: Patient sounds very sick or weak to the triager    Negative: Wearing a \"holter monitor\" or " "\"cardiac event monitor\"    Negative: Received SHOCK from implantable cardiac defibrillator (and now feels well)    Negative: Heart beating very rapidly (e.g., > 140 / minute) and not present now (EXCEPTION: during exercise)    Negative: Skipped or extra beat(s) and increases with exercise or exertion    Skipped or extra beat(s) and occurs 4 or more times per minute    Protocols used: HEART RATE AND HEARTBEAT [id number]      "

## 2020-06-17 ENCOUNTER — MYC MEDICAL ADVICE (OUTPATIENT)
Dept: INTERNAL MEDICINE | Facility: CLINIC | Age: 70
End: 2020-06-17

## 2020-06-18 ENCOUNTER — VIRTUAL VISIT (OUTPATIENT)
Dept: INTERNAL MEDICINE | Facility: CLINIC | Age: 70
End: 2020-06-18
Payer: COMMERCIAL

## 2020-06-18 DIAGNOSIS — R00.2 PALPITATIONS: Primary | ICD-10-CM

## 2020-06-18 DIAGNOSIS — D58.2 ELEVATED HEMOGLOBIN (H): ICD-10-CM

## 2020-06-18 PROCEDURE — 99214 OFFICE O/P EST MOD 30 MIN: CPT | Mod: GT | Performed by: INTERNAL MEDICINE

## 2020-06-18 NOTE — PROGRESS NOTES
"Yandy Zamudio is a 70 year old female who is being evaluated via a billable video visit.      The patient has been notified of following:     \"This video visit will be conducted via a call between you and your physician/provider. We have found that certain health care needs can be provided without the need for an in-person physical exam.  This service lets us provide the care you need with a video conversation.  If a prescription is necessary we can send it directly to your pharmacy.  If lab work is needed we can place an order for that and you can then stop by our lab to have the test done at a later time.    Video visits are billed at different rates depending on your insurance coverage.  Please reach out to your insurance provider with any questions.    If during the course of the call the physician/provider feels a video visit is not appropriate, you will not be charged for this service.\"    Patient has given verbal consent for Video visit? Yes    Will anyone else be joining your video visit? No      Subjective     Yandy Zamudio is a 70 year old female who presents today via video visit for the following health issues:    HPI  Pt to discuss heart palpatations         Video Start Time: 1:25pm    Pt's past medical history, family history, habits, medications and allergies were reviewed with the patient today.  Most recent lab results reviewed with pt. Problem list and histories reviewed & adjusted, as indicated.  Additional history as below:    HPI:  Recently hospitalized for palpitations and atypical chest pain. Had been present for several weeks prior.    Echocardiogram showed no significant abnormalities.  Telemetry showed no significant abnormalities.  Was seen in consultation by cardiology who recommended that she undergo outpatient exercise stress echocardiogram. .  She did have a SARS-CoV-2 PCR performed during hospital stay which was negative.  Patient underwent a stress echo which showed a " hyperdynamic heart response and blood pressure little higher than expected but was negative for ischemia.  Patient describes 2 types of palpitations.  One is a skipped beat which will often occur in a trigeminy pattern the patient listens to her heart with her father's old stethoscope no occasionally she will go about 9 or 10 beats before having a ectopic beat.  Not having lightheadedness or dizziness with those episodes.  She describes other times however where she can have her heart rate go up to 140 bpm according to a Fitbit but this can last up to 5 to 10 minutes.  Patient also noticed that her heart will suddenly go quite fast with minimal activity at times though this was not seen during her stress test.  Patient longer having chest pain.  She will noticed some lightheadedness and slight sense of shortness of breath if her heart rate is quite fast but not with usual activity.  Did not have chest pain during her stress echo.  States she normally drinks fluids well and did not feel like she was recently dehydrated.  Labs showed an elevated hemoglobin level however.  Mood good with meds  BPs well controlled at home and generally < 130/80     Additional ROS:   Constitutional, HEENT, Cardiovascular, Pulmonary, GI and , Neuro, MSK and Psych review of systems/symptoms are otherwise negative or unchanged from previous, except as noted above.           Objective    Not obtained today    Physical Exam   GENERAL:   alert and no distress. Calm  EYES: Eyes grossly normal to inspection, conjunctivae and sclerae normal  RESP: no audible wheeze, cough, or visible cyanosis.  No visible retractions or increased work of breathing.  Able to speak fully in complete sentences.  NEURO: Cranial nerves grossly intact, mentation intact and speech normal  PSYCH: mentation appears normal, affect normal/bright, judgement and insight intact, normal speech and appearance well-groomed       ASSESSMENT:  1. Palpitations  No palpitations  seen with recent stress echo.  So patient's palpitation symptoms sound like PVCs but patient also having periods of sudden tachycardia initially were suggestive of SVT versus others.  Patient son does have a history of POTS syndrome and the fact that some prolonged tachycardia-like palpitations  occur with minimal activity could suggest similar. Will get Ziopatch Holter. Labs as ordered  - CBC with platelets; Future  - Basic metabolic panel; Future  - Cortisol; Future  - Magnesium; Future  - Zio Patch Holter Adult Pediatric Greater than 48 hrs; Future    2. Elevated hemoglobin (H)  Denies dehydration. Drinking fluids well. Repeat lab  - CBC with platelets; Future      PLAN:   Ziopatch placement with  [facility] clinic in [place].    Lab appt at [facility] lab (nonfasting) in the next week when also having Ziopatch placed. Drink a couple glasses of water before appt  Pt will call to schedule both appointments  Defer possible trial of beta-blocker until after Ziopatch completed  Continue current medications        Video-Visit Details    Type of service:  Video Visit    Video End Time:1:48pm    Originating Location (pt. Location): Home    Distant Location (provider location):  [facility]     Platform used for Video Visit: Edda Rivera MD  Internal Medicine Department  [facility]        (Chart documentation was completed, in part, with [employer] voice-recognition software. Even though reviewed, some grammatical, spelling, and word errors may remain.)

## 2020-06-21 PROBLEM — D58.2 ELEVATED HEMOGLOBIN (H): Status: ACTIVE | Noted: 2020-06-21

## 2020-06-21 PROBLEM — R00.2 PALPITATIONS: Status: ACTIVE | Noted: 2020-06-21

## 2020-06-22 NOTE — PATIENT INSTRUCTIONS
Ziopatch placement with  [facility] Cardiology clinic in [place].    Lab appt at [facility] lab (nonfasting) in the next week when also having Ziopatch placed. Drink a couple glasses of water before appt  Pt will call to schedule both appointments  Defer possible trial of beta-blocker until after Ziopatch completed  Continue current medications

## 2020-06-23 ENCOUNTER — HOSPITAL ENCOUNTER (OUTPATIENT)
Dept: CARDIOLOGY | Facility: CLINIC | Age: 70
Discharge: HOME | End: 2020-06-23
Attending: INTERNAL MEDICINE | Admitting: INTERNAL MEDICINE
Payer: COMMERCIAL

## 2020-06-23 DIAGNOSIS — D58.2 ELEVATED HEMOGLOBIN (H): ICD-10-CM

## 2020-06-23 DIAGNOSIS — R00.2 PALPITATIONS: ICD-10-CM

## 2020-06-23 LAB
CORTIS SERPL-MCNC: 13 UG/DL (ref 4–22)
ERYTHROCYTE [DISTWIDTH] IN BLOOD BY AUTOMATED COUNT: 12.9 % (ref 10–15)
HCT VFR BLD AUTO: 47.4 % (ref 35–47)
HGB BLD-MCNC: 15.3 G/DL (ref 11.7–15.7)
MCH RBC QN AUTO: 28.7 PG (ref 26.5–33)
MCHC RBC AUTO-ENTMCNC: 32.3 G/DL (ref 31.5–36.5)
MCV RBC AUTO: 89 FL (ref 78–100)
PLATELET # BLD AUTO: 275 10E9/L (ref 150–450)
RBC # BLD AUTO: 5.33 10E12/L (ref 3.8–5.2)
WBC # BLD AUTO: 8.3 10E9/L (ref 4–11)

## 2020-06-23 PROCEDURE — 82533 TOTAL CORTISOL: CPT | Performed by: INTERNAL MEDICINE

## 2020-06-23 PROCEDURE — 0296T ZIO PATCH HOLTER ADULT PEDIATRIC GREATER THAN 48 HRS: CPT

## 2020-06-23 PROCEDURE — 85027 COMPLETE CBC AUTOMATED: CPT | Performed by: INTERNAL MEDICINE

## 2020-06-23 PROCEDURE — 36415 COLL VENOUS BLD VENIPUNCTURE: CPT | Performed by: INTERNAL MEDICINE

## 2020-06-23 PROCEDURE — 83735 ASSAY OF MAGNESIUM: CPT | Performed by: INTERNAL MEDICINE

## 2020-06-23 PROCEDURE — 0298T ZIO PATCH HOLTER ADULT PEDIATRIC GREATER THAN 48 HRS: CPT | Performed by: INTERNAL MEDICINE

## 2020-06-23 PROCEDURE — 80048 BASIC METABOLIC PNL TOTAL CA: CPT | Performed by: INTERNAL MEDICINE

## 2020-06-24 LAB
ANION GAP SERPL CALCULATED.3IONS-SCNC: 5 MMOL/L (ref 3–14)
BUN SERPL-MCNC: 15 MG/DL (ref 7–30)
CALCIUM SERPL-MCNC: 9.1 MG/DL (ref 8.5–10.1)
CHLORIDE SERPL-SCNC: 105 MMOL/L (ref 94–109)
CO2 SERPL-SCNC: 29 MMOL/L (ref 20–32)
CREAT SERPL-MCNC: 0.69 MG/DL (ref 0.52–1.04)
GFR SERPL CREATININE-BSD FRML MDRD: 89 ML/MIN/{1.73_M2}
GLUCOSE SERPL-MCNC: 99 MG/DL (ref 70–99)
MAGNESIUM SERPL-MCNC: 2.1 MG/DL (ref 1.6–2.3)
POTASSIUM SERPL-SCNC: 4 MMOL/L (ref 3.4–5.3)
SODIUM SERPL-SCNC: 139 MMOL/L (ref 133–144)

## 2020-09-23 ENCOUNTER — MYC MEDICAL ADVICE (OUTPATIENT)
Dept: INTERNAL MEDICINE | Facility: CLINIC | Age: 70
End: 2020-09-23

## 2020-10-01 NOTE — TELEPHONE ENCOUNTER
Spoke with patient 9/29/2020 by phone. Ziopatch report reviewed.  Had 2 very brief episodes of ventricular tachycardia and otherwise some brief episodes of SVT.  Patient had stress echo just prior to the Ziopatch and did not have any arrhythmias when heart rate stressed and no ischemia was seen on the study.  Patient also states that since that Ziopatch was done, she has not had any recurrent episodes of palpitations.  Patient has been active with walking and other exercise denies symptoms of palpitations during exercise or chest pain/shortness of breath.  Therefore, will defer addition of beta-blocker therapy order cardiology consultation.  If future palpitation symptoms, patient was informed to contact clinic and will consider addition of metoprolol versus cardiology referral with EP

## 2020-11-16 ENCOUNTER — MYC MEDICAL ADVICE (OUTPATIENT)
Dept: INTERNAL MEDICINE | Facility: CLINIC | Age: 70
End: 2020-11-16

## 2020-11-17 NOTE — TELEPHONE ENCOUNTER
PCP please review MyChart message as patient is wondering can he taper off Duloxetine in the next 3-4 weeks.    Nichole RANKIN, RN, PHN

## 2020-12-07 ENCOUNTER — MYC MEDICAL ADVICE (OUTPATIENT)
Dept: INTERNAL MEDICINE | Facility: CLINIC | Age: 70
End: 2020-12-07

## 2020-12-09 DIAGNOSIS — E78.5 HYPERLIPIDEMIA LDL GOAL <130: ICD-10-CM

## 2020-12-09 DIAGNOSIS — I10 ESSENTIAL HYPERTENSION WITH GOAL BLOOD PRESSURE LESS THAN 140/90: ICD-10-CM

## 2020-12-09 LAB
ALBUMIN SERPL-MCNC: 4 G/DL (ref 3.4–5)
ALP SERPL-CCNC: 120 U/L (ref 40–150)
ALT SERPL W P-5'-P-CCNC: 33 U/L (ref 0–50)
ANION GAP SERPL CALCULATED.3IONS-SCNC: 4 MMOL/L (ref 3–14)
AST SERPL W P-5'-P-CCNC: 25 U/L (ref 0–45)
BILIRUB SERPL-MCNC: 0.5 MG/DL (ref 0.2–1.3)
BUN SERPL-MCNC: 18 MG/DL (ref 7–30)
CALCIUM SERPL-MCNC: 9.3 MG/DL (ref 8.5–10.1)
CHLORIDE SERPL-SCNC: 107 MMOL/L (ref 94–109)
CHOLEST SERPL-MCNC: 178 MG/DL
CO2 SERPL-SCNC: 27 MMOL/L (ref 20–32)
CREAT SERPL-MCNC: 0.64 MG/DL (ref 0.52–1.04)
GFR SERPL CREATININE-BSD FRML MDRD: >90 ML/MIN/{1.73_M2}
GLUCOSE SERPL-MCNC: 99 MG/DL (ref 70–99)
HDLC SERPL-MCNC: 53 MG/DL
LDLC SERPL CALC-MCNC: 85 MG/DL
NONHDLC SERPL-MCNC: 125 MG/DL
POTASSIUM SERPL-SCNC: 3.8 MMOL/L (ref 3.4–5.3)
PROT SERPL-MCNC: 7.6 G/DL (ref 6.8–8.8)
SODIUM SERPL-SCNC: 138 MMOL/L (ref 133–144)
TRIGL SERPL-MCNC: 198 MG/DL

## 2020-12-09 PROCEDURE — 36415 COLL VENOUS BLD VENIPUNCTURE: CPT | Performed by: INTERNAL MEDICINE

## 2020-12-09 PROCEDURE — 80061 LIPID PANEL: CPT | Performed by: INTERNAL MEDICINE

## 2020-12-09 PROCEDURE — 80053 COMPREHEN METABOLIC PANEL: CPT | Performed by: INTERNAL MEDICINE

## 2020-12-14 ENCOUNTER — OFFICE VISIT (OUTPATIENT)
Dept: INTERNAL MEDICINE | Facility: CLINIC | Age: 70
End: 2020-12-14
Payer: COMMERCIAL

## 2020-12-14 VITALS
DIASTOLIC BLOOD PRESSURE: 80 MMHG | SYSTOLIC BLOOD PRESSURE: 134 MMHG | HEART RATE: 95 BPM | RESPIRATION RATE: 16 BRPM | OXYGEN SATURATION: 97 % | TEMPERATURE: 97.1 F | BODY MASS INDEX: 28.61 KG/M2 | HEIGHT: 66 IN | WEIGHT: 178 LBS

## 2020-12-14 DIAGNOSIS — I10 ESSENTIAL HYPERTENSION: ICD-10-CM

## 2020-12-14 DIAGNOSIS — Z12.31 ENCOUNTER FOR SCREENING MAMMOGRAM FOR BREAST CANCER: ICD-10-CM

## 2020-12-14 DIAGNOSIS — Z12.11 SPECIAL SCREENING FOR MALIGNANT NEOPLASMS, COLON: ICD-10-CM

## 2020-12-14 DIAGNOSIS — E78.5 HYPERLIPIDEMIA LDL GOAL <100: ICD-10-CM

## 2020-12-14 DIAGNOSIS — M19.90 OSTEOARTHRITIS, UNSPECIFIED OSTEOARTHRITIS TYPE, UNSPECIFIED SITE: ICD-10-CM

## 2020-12-14 DIAGNOSIS — N39.41 URGENCY INCONTINENCE: ICD-10-CM

## 2020-12-14 DIAGNOSIS — Z00.00 MEDICARE ANNUAL WELLNESS VISIT, SUBSEQUENT: Primary | ICD-10-CM

## 2020-12-14 LAB
ALBUMIN UR-MCNC: NEGATIVE MG/DL
APPEARANCE UR: CLEAR
BACTERIA #/AREA URNS HPF: ABNORMAL /HPF
BILIRUB UR QL STRIP: NEGATIVE
COLOR UR AUTO: YELLOW
GLUCOSE UR STRIP-MCNC: NEGATIVE MG/DL
HGB UR QL STRIP: NEGATIVE
KETONES UR STRIP-MCNC: NEGATIVE MG/DL
LEUKOCYTE ESTERASE UR QL STRIP: ABNORMAL
NITRATE UR QL: NEGATIVE
PH UR STRIP: 5.5 PH (ref 5–7)
RBC #/AREA URNS AUTO: ABNORMAL /HPF
SOURCE: ABNORMAL
SP GR UR STRIP: 1.02 (ref 1–1.03)
UROBILINOGEN UR STRIP-ACNC: 0.2 EU/DL (ref 0.2–1)
WBC #/AREA URNS AUTO: ABNORMAL /HPF

## 2020-12-14 PROCEDURE — 81001 URINALYSIS AUTO W/SCOPE: CPT | Performed by: INTERNAL MEDICINE

## 2020-12-14 PROCEDURE — 99214 OFFICE O/P EST MOD 30 MIN: CPT | Mod: 25 | Performed by: INTERNAL MEDICINE

## 2020-12-14 PROCEDURE — 99397 PER PM REEVAL EST PAT 65+ YR: CPT | Performed by: INTERNAL MEDICINE

## 2020-12-14 RX ORDER — SULFAMETHOXAZOLE/TRIMETHOPRIM 800-160 MG
1 TABLET ORAL 2 TIMES DAILY
Qty: 14 TABLET | Refills: 0 | Status: SHIPPED | OUTPATIENT
Start: 2020-12-14 | End: 2020-12-21

## 2020-12-14 ASSESSMENT — ACTIVITIES OF DAILY LIVING (ADL): CURRENT_FUNCTION: NO ASSISTANCE NEEDED

## 2020-12-14 ASSESSMENT — PATIENT HEALTH QUESTIONNAIRE - PHQ9: SUM OF ALL RESPONSES TO PHQ QUESTIONS 1-9: 1

## 2020-12-14 ASSESSMENT — MIFFLIN-ST. JEOR: SCORE: 1349.15

## 2020-12-14 NOTE — PATIENT INSTRUCTIONS
Continue current meds  Prescriptions refilled.    Call  [phone] or use [employer] to schedule a future lab appointment  fasting in 1 year.   For fasting labs, please refrain from eating for 8 hours or more.   Drink 2 glasses of water before your lab appointment. It is fine to take your  oral medications on the morning of the lab test as usual  Mammogram     Colonoscopy in Spring/ Summer 2021. Email me when ready to have ordered  Bone density test (DEXA) in 1 year   Heat/stretching of hands in AM. ? Trial of fish oil 1 gram daily to see if helps  Possible future hand therapy referral.  Call if wish to have ordered  Sulfamethoxazole-Trimethoprim 800-160 MG Oral Tablet (BACTRIM DS), 1 tab twice a day for 7 days for the urinary tract infection

## 2020-12-14 NOTE — PROGRESS NOTES
"SUBJECTIVE:   Yandy Zamudio is a 70 year old female who presents for Preventive Visit and establish issues including hypertension and hyperlipidemia    Are you in the first 12 months of your Medicare coverage?  No    Healthy Habits:     In general, how would you rate your overall health?  Excellent    Frequency of exercise:  4-5 days/week    Duration of exercise:  30-45 minutes    Do you usually eat at least 4 servings of fruit and vegetables a day, include whole grains    & fiber and avoid regularly eating high fat or \"junk\" foods?  Yes    Taking medications regularly:  Yes    Barriers to taking medications:  None    Medication side effects:  Not applicable    Ability to successfully perform activities of daily living:  No assistance needed    Home Safety:  No safety concerns identified    Hearing Impairment:  No hearing concerns    In the past 6 months, have you been bothered by leaking of urine? Yes    In general, how would you rate your overall mental or emotional health?  Excellent      PHQ-2 Total Score: 0    Additional concerns today:  Yes (Leaking of Urine, test results for Ziopatch )    Do you feel safe in your environment? Yes    Have you ever done Advance Care Planning? (For example, a Health Directive, POLST, or a discussion with a medical provider or your loved ones about your wishes): No, advance care planning information given to patient to review.  Advanced care planning was discussed at today's visit.      Fall risk  Fallen 2 or more times in the past year?: No  Any fall with injury in the past year?: No    Cognitive Screening   1) Repeat 3 items (Leader, Season, Table)    2) Clock draw: NORMAL  3) 3 item recall: Recalls 3 objects  Results: Normal Clock and item 3 recall    Mini-CogTM Copyright ROLAN Baeza. Licensed by the author for use in [facility]; reprinted with permission (princess@.[facility]). All rights reserved.      Do you have sleep apnea, excessive snoring or daytime drowsiness?: " no    Reviewed and updated as needed this visit by clinical staff   Allergies               Reviewed and updated as needed this visit by Provider                Social History     Tobacco Use     Smoking status: Never Smoker     Smokeless tobacco: Never Used   Substance Use Topics     Alcohol use: Yes     Comment: once in a while, small amount     If you drink alcohol do you typically have >3 drinks per day or >7 drinks per week? No    Alcohol Use 12/13/2019   Prescreen: >3 drinks/day or >7 drinks/week? -   Prescreen: >3 drinks/day or >7 drinks/week? No         Current providers sharing in care for this patient include:   Patient Care Team:  Omid Rivera MD as PCP - General  Omid Rivera MD as Assigned PCP  Mihai Viveros as Other (see comments) (Therapist)    The following health maintenance items are reviewed in Epic and correct as of today:  Health Maintenance   Topic Date Due     DTAP/TDAP/TD IMMUNIZATION (2 - Td) 12/11/2017     PHQ-9  06/13/2020     FALL RISK ASSESSMENT  12/13/2020     MEDICARE ANNUAL WELLNESS VISIT  12/13/2020     MAMMO SCREENING  03/13/2021     BMP  06/09/2021     LIPID  12/09/2021     DEXA  06/01/2022     COLORECTAL CANCER SCREENING  05/22/2024     ADVANCE CARE PLANNING  12/30/2024     HEPATITIS C SCREENING  Completed     DEPRESSION ACTION PLAN  Completed     INFLUENZA VACCINE  Completed     Pneumococcal Vaccine: 65+ Years  Completed     ZOSTER IMMUNIZATION  Completed     Pneumococcal Vaccine: Pediatrics (0 to 5 Years) and At-Risk Patients (6 to 64 Years)  Aged Out     IPV IMMUNIZATION  Aged Out     MENINGITIS IMMUNIZATION  Aged Out     HEPATITIS B IMMUNIZATION  Aged Out     Labs reviewed in EPIC    Component      Latest Ref Rng & Units 6/23/2020 12/9/2020   Sodium      133 - 144 mmol/L  138   Potassium      3.4 - 5.3 mmol/L  3.8   Chloride      94 - 109 mmol/L  107   Carbon Dioxide      20 - 32 mmol/L  27   Anion Gap      3 - 14 mmol/L  4   Glucose      70 - 99 mg/dL  99   Urea  Nitrogen      7 - 30 mg/dL  18   Creatinine      0.52 - 1.04 mg/dL  0.64   GFR Estimate      >60 mL/min/1.73:m2  >90   GFR Estimate If Black      >60 mL/min/1.73:m2  >90   Calcium      8.5 - 10.1 mg/dL  9.3   Bilirubin Total      0.2 - 1.3 mg/dL  0.5   Albumin      3.4 - 5.0 g/dL  4.0   Protein Total      6.8 - 8.8 g/dL  7.6   Alkaline Phosphatase      40 - 150 U/L  120   ALT      0 - 50 U/L  33   AST      0 - 45 U/L  25   WBC      4.0 - 11.0 10e9/L 8.3    RBC Count      3.8 - 5.2 10e12/L 5.33 (H)    Hemoglobin      11.7 - 15.7 g/dL 15.3    Hematocrit      35.0 - 47.0 % 47.4 (H)    MCV      78 - 100 fl 89    MCH      26.5 - 33.0 pg 28.7    MCHC      31.5 - 36.5 g/dL 32.3    RDW      10.0 - 15.0 % 12.9    Platelet Count      150 - 450 10e9/L 275    Cholesterol      <200 mg/dL  178   Triglycerides      <150 mg/dL  198 (H)   HDL Cholesterol      >49 mg/dL  53   LDL Cholesterol Calculated      <100 mg/dL  85   Non HDL Cholesterol      <130 mg/dL  125       Review of Systems  CONSTITUTIONAL: NEGATIVE for fever, chills. Mild weight gain  INTEGUMENTARY/SKIN: NEGATIVE for worrisome rashes, moles or lesions  EYES: NEGATIVE for vision changes or irritation. Due for eye exam. Has glasses  ENT/MOUTH: NEGATIVE for ear, mouth and throat problems. Rare nasal congestion  RESP: NEGATIVE for significant cough or SOB  BREAST: NEGATIVE for masses, tenderness or discharge. Due for mammogram   CV: NEGATIVE for chest pain, current palpitations or peripheral edema.  Previous history of palpitations with SVT and 2 brief episodes of VT  Seen on Ziopatch.  Had previously discussed results with patient.  Patient just wishes to go over study again.  Patient underwent cardiac stress testing which was negative for ischemia did not have any ectopy during the study.  Has not had recurrence of palpitation symptoms despite good exercise walking regimen 4-5 times a week  GI: NEGATIVE for nausea, abdominal pain, heartburn, or change in bowel habits.  "Due for colonoscopy  : NEGATIVE for frequency, dysuria, or hematuria. Has some urinary incontinence  at times recently  MUSCULOSKELETAL: NEGATIVE for significant arthralgias or myalgia beside osteoarthritis hands. Squeezing of hands  a little harder with osteoarthritis. Mild stiffness. On NSAID  NEURO: NEGATIVE for general weakness, dizziness or paresthesias.   ENDOCRINE: NEGATIVE for temperature intolerance, skin/hair changes. On statin for lipids  HEME: NEGATIVE for bleeding problems  PSYCHIATRIC: NEGATIVE for changes in mood or affect. Off Cymbalta    OBJECTIVE:   /80   Pulse 95   Temp 97.1  F (36.2  C) (Temporal)   Resp 16   Ht 1.676 m (5' 6\")   Wt 80.7 kg (178 lb)   LMP 07/27/2009   SpO2 97%   BMI 28.73 kg/m   Estimated body mass index is 27.61 kg/m  as calculated from the following:    Height as of 6/9/20: 1.702 m (5' 7\").    Weight as of 6/9/20: 80 kg (176 lb 4.8 oz).  Physical Exam  General appearance - healthy, alert, no distress.  Normal affect  Skin - No rashes or lesions.  Head - normocephalic, atraumatic  Eyes - CHRISTINA, EOMI, fundi exam with nondilated pupils negative.  Ears - External ears normal. Canals clear. TM's normal.  Nose/Sinuses - Nares normal. Septum midline. Mucosa normal. No drainage or sinus tenderness.  Oropharynx - No erythema, no adenopathy, no exudates.  Neck - Supple without adenopathy or thyromegaly. No bruits.  Lungs - Clear to auscultation without wheezes/rhonchi.  Heart - Regular rate and rhythm without murmurs, clicks, or gallops.  Nodes - No supraclavicular, axillary, or inguinal adenopathy palpable.  Breasts - deferred  Abdomen - Abdomen soft, non-tender. BS normal. No masses or hepatosplenomegaly palpable. No bruits.  Extremities -No cyanosis, clubbing or edema.    Musculoskeletal - Spine ROM normal. Muscular strength intact.  DIP and PIP joints of hands with OA thickening bilaterally.  No increased warmth/erythema of joints.  Normal MCP joints  Peripheral " pulses - radial=4/4, femoral=4/4, posterior tibial=4/4, dorsalis pedis=4/4,  Neuro - Gait normal. Reflexes normal and symmetric. Sensation grossly WNL.  Genital/Rectal - deferred          ASSESSMENT / PLAN:   1. Medicare annual wellness visit, subsequent  Immunizations up-to-date.  Due for colon cancer screening but with Covid pandemic, wishes to delay until next spring/summer.  Breast cancer screening as below.  Otherwise up-to-date    2. Urgency incontinence  Urinalysis done and positive for glucosuria.  Will treat with 7 days of antibiotic therapy.  If symptoms do not improve with this, patient to inform physician  - UA reflex to Microscopic and Culture  - Urine Microscopic  - sulfamethoxazole-trimethoprim (BACTRIM DS) 800-160 MG tablet; Take 1 tablet by mouth 2 times daily for 7 days  Dispense: 14 tablet; Refill: 0    3. Essential hypertension  Controlled.  Continue current medication.  Repeat lab 1 year  - amLODIPine (NORVASC) 5 MG tablet; Take 1 tablet (5 mg) by mouth At Bedtime  Dispense: 90 tablet; Refill: 3  - lisinopril-hydrochlorothiazide (ZESTORETIC) 10-12.5 MG tablet; Take 1 tablet by mouth At Bedtime  Dispense: 90 tablet; Refill: 3  - Comprehensive metabolic panel; Future    4. Hyperlipidemia LDL goal <100  Controlled.  Continue current medication.  Repeat lab 1 year  - atorvastatin (LIPITOR) 20 MG tablet; Take 1 tablet (20 mg) by mouth At Bedtime  Dispense: 90 tablet; Refill: 3  - Lipid panel reflex to direct LDL Fasting; Future  - Comprehensive metabolic panel; Future    5. Osteoarthritis, unspecified osteoarthritis type, unspecified site  Stable.  Continue meloxicam.  Stretching of hands under heat therapy in a.m.  - meloxicam (MOBIC) 15 MG tablet; Take 1 tablet (15 mg) by mouth At Bedtime  Dispense: 90 tablet; Refill: 3    6. Encounter for screening mammogram for breast cancer  Due for breast cancer screening  - *MA Screening Digital Bilateral; Future    7. Special screening for malignant  "neoplasms, colon  Due for colonoscopy.  Patient wishes to defer until spring/summer 2021.  Will inform physician when willing to have done and referral will then be placed at that time      Patient has been advised of split billing requirements and indicates understanding: Yes     COUNSELING:  Reviewed preventive health counseling, as reflected in patient instructions    Estimated body mass index is 28.73 kg/m  as calculated from the following:    Height as of this encounter: 1.676 m (5' 6\").    Weight as of this encounter: 80.7 kg (178 lb).        She reports that she has never smoked. She has never used smokeless tobacco.      Appropriate preventive services were discussed with this patient, including applicable screening as appropriate for cardiovascular disease, diabetes, osteopenia/osteoporosis, and glaucoma.  As appropriate for age/gender, discussed screening for colorectal cancer, prostate cancer, breast cancer, and cervical cancer. Checklist reviewing preventive services available has been given to the patient.    Reviewed patients plan of care and provided an AVS. The Basic Care Plan (routine screening as documented in Health Maintenance) for Yandy meets the Care Plan requirement. This Care Plan has been established and reviewed with the Patient.    Counseling Resources:  ATP IV Guidelines  Pooled Cohorts Equation Calculator  Breast Cancer Risk Calculator  Breast Cancer: Medication to Reduce Risk  FRAX Risk Assessment  ICSI Preventive Guidelines  Dietary Guidelines for Americans, 2010  USDA's MyPlate  ASA Prophylaxis  Lung CA Screening    PLAN:  Continue current meds  Prescriptions refilled.    Call  [phone] or use [employer] to schedule a future lab appointment  fasting in 1 year.   For fasting labs, please refrain from eating for 8 hours or more.   Drink 2 glasses of water before your lab appointment. It is fine to take your  oral medications on the morning of the lab test as usual  Mammogram   "   Colonoscopy in Spring/ Summer 2021. Email me when ready to have ordered  Bone density test (DEXA) in 1 year   Heat/stretching of hands in AM. ? Trial of fish oil 1 gram daily to see if helps  Possible future hand therapy referral.  Call if wish to have ordered  Sulfamethoxazole-Trimethoprim 800-160 MG Oral Tablet (BACTRIM DS), 1 tab twice a day for 7 days for the urinary tract infection    Pt was informed regarding extra E&M billing for management of new or established medical issues not related to today's wellness visit        Omid Rivera MD  [facility]

## 2020-12-16 PROBLEM — D58.2 ELEVATED HEMOGLOBIN (H): Status: RESOLVED | Noted: 2020-06-21 | Resolved: 2020-12-16

## 2020-12-16 PROBLEM — R07.9 CHEST PAIN: Status: RESOLVED | Noted: 2020-06-09 | Resolved: 2020-12-16

## 2020-12-16 PROBLEM — F32.0 MAJOR DEPRESSIVE DISORDER, SINGLE EPISODE, MILD (H): Status: RESOLVED | Noted: 2017-05-22 | Resolved: 2020-12-16

## 2020-12-16 PROBLEM — R00.2 PALPITATIONS: Status: RESOLVED | Noted: 2020-06-21 | Resolved: 2020-12-16

## 2020-12-16 RX ORDER — LISINOPRIL/HYDROCHLOROTHIAZIDE 10-12.5 MG
1 TABLET ORAL AT BEDTIME
Qty: 90 TABLET | Refills: 3 | Status: SHIPPED | OUTPATIENT
Start: 2020-12-16 | End: 2021-12-21

## 2020-12-16 RX ORDER — MELOXICAM 15 MG/1
15 TABLET ORAL AT BEDTIME
Qty: 90 TABLET | Refills: 3 | Status: SHIPPED | OUTPATIENT
Start: 2020-12-16 | End: 2022-01-27

## 2020-12-16 RX ORDER — ATORVASTATIN CALCIUM 20 MG/1
20 TABLET, FILM COATED ORAL AT BEDTIME
Qty: 90 TABLET | Refills: 3 | Status: SHIPPED | OUTPATIENT
Start: 2020-12-16 | End: 2022-02-03

## 2020-12-16 RX ORDER — AMLODIPINE BESYLATE 5 MG/1
5 TABLET ORAL AT BEDTIME
Qty: 90 TABLET | Refills: 3 | Status: SHIPPED | OUTPATIENT
Start: 2020-12-16 | End: 2021-12-21

## 2021-07-04 ENCOUNTER — HEALTH MAINTENANCE LETTER (OUTPATIENT)
Age: 71
End: 2021-07-04

## 2021-10-24 ENCOUNTER — HEALTH MAINTENANCE LETTER (OUTPATIENT)
Age: 71
End: 2021-10-24

## 2021-12-17 DIAGNOSIS — I10 ESSENTIAL HYPERTENSION: ICD-10-CM

## 2021-12-21 RX ORDER — AMLODIPINE BESYLATE 5 MG/1
TABLET ORAL
Qty: 90 TABLET | Refills: 0 | Status: SHIPPED | OUTPATIENT
Start: 2021-12-21 | End: 2022-03-22

## 2021-12-21 RX ORDER — LISINOPRIL/HYDROCHLOROTHIAZIDE 10-12.5 MG
1 TABLET ORAL AT BEDTIME
Qty: 90 TABLET | Refills: 0 | Status: SHIPPED | OUTPATIENT
Start: 2021-12-21 | End: 2022-03-22

## 2021-12-21 NOTE — TELEPHONE ENCOUNTER
Routing refill request to provider for review/approval because:  BP Readings from Last 3 Encounters:   12/14/20 134/80   06/09/20 136/87   06/08/20 (!) 164/78     Creatinine   Date Value Ref Range Status   12/09/2020 0.64 0.52 - 1.04 mg/dL Final     Potassium   Date Value Ref Range Status   12/09/2020 3.8 3.4 - 5.3 mmol/L Final         Ronan Beck RN  [facility] Triage Nurse

## 2022-01-26 DIAGNOSIS — M19.90 OSTEOARTHRITIS, UNSPECIFIED OSTEOARTHRITIS TYPE, UNSPECIFIED SITE: ICD-10-CM

## 2022-01-27 RX ORDER — MELOXICAM 15 MG/1
TABLET ORAL
Qty: 90 TABLET | Refills: 0 | Status: SHIPPED | OUTPATIENT
Start: 2022-01-27 | End: 2022-04-21

## 2022-01-27 NOTE — TELEPHONE ENCOUNTER
Routing refill request to provider for review/approval because:  Labs not current.  & pt does have appt scheduled on: 3/17/2022 11:00 AM.

## 2022-02-13 ENCOUNTER — HEALTH MAINTENANCE LETTER (OUTPATIENT)
Age: 72
End: 2022-02-13

## 2022-03-05 ENCOUNTER — DOCUMENTATION ONLY (OUTPATIENT)
Dept: LAB | Facility: CLINIC | Age: 72
End: 2022-03-05
Payer: COMMERCIAL

## 2022-03-10 ENCOUNTER — LAB (OUTPATIENT)
Dept: LAB | Facility: CLINIC | Age: 72
End: 2022-03-10
Payer: COMMERCIAL

## 2022-03-10 ENCOUNTER — ANCILLARY PROCEDURE (OUTPATIENT)
Dept: MAMMOGRAPHY | Facility: CLINIC | Age: 72
End: 2022-03-10
Attending: INTERNAL MEDICINE
Payer: COMMERCIAL

## 2022-03-10 DIAGNOSIS — Z12.31 VISIT FOR SCREENING MAMMOGRAM: ICD-10-CM

## 2022-03-10 DIAGNOSIS — I10 ESSENTIAL HYPERTENSION: ICD-10-CM

## 2022-03-10 DIAGNOSIS — E78.5 HYPERLIPIDEMIA LDL GOAL <100: ICD-10-CM

## 2022-03-10 DIAGNOSIS — Z12.31 OTHER SCREENING MAMMOGRAM: ICD-10-CM

## 2022-03-10 LAB
ALBUMIN SERPL-MCNC: 4 G/DL (ref 3.4–5)
ALP SERPL-CCNC: 108 U/L (ref 40–150)
ALT SERPL W P-5'-P-CCNC: 66 U/L (ref 0–50)
ANION GAP SERPL CALCULATED.3IONS-SCNC: 7 MMOL/L (ref 3–14)
AST SERPL W P-5'-P-CCNC: 36 U/L (ref 0–45)
BILIRUB SERPL-MCNC: 0.6 MG/DL (ref 0.2–1.3)
BUN SERPL-MCNC: 17 MG/DL (ref 7–30)
CALCIUM SERPL-MCNC: 10 MG/DL (ref 8.5–10.1)
CHLORIDE BLD-SCNC: 104 MMOL/L (ref 94–109)
CHOLEST SERPL-MCNC: 172 MG/DL
CO2 SERPL-SCNC: 27 MMOL/L (ref 20–32)
CREAT SERPL-MCNC: 0.74 MG/DL (ref 0.52–1.04)
FASTING STATUS PATIENT QL REPORTED: YES
GFR SERPL CREATININE-BSD FRML MDRD: 86 ML/MIN/1.73M2
GLUCOSE BLD-MCNC: 114 MG/DL (ref 70–99)
HDLC SERPL-MCNC: 50 MG/DL
LDLC SERPL CALC-MCNC: 74 MG/DL
NONHDLC SERPL-MCNC: 122 MG/DL
POTASSIUM BLD-SCNC: 4.2 MMOL/L (ref 3.4–5.3)
PROT SERPL-MCNC: 7.7 G/DL (ref 6.8–8.8)
SODIUM SERPL-SCNC: 138 MMOL/L (ref 133–144)
TRIGL SERPL-MCNC: 241 MG/DL

## 2022-03-10 PROCEDURE — 36415 COLL VENOUS BLD VENIPUNCTURE: CPT

## 2022-03-10 PROCEDURE — 77063 BREAST TOMOSYNTHESIS BI: CPT | Mod: TC | Performed by: RADIOLOGY

## 2022-03-10 PROCEDURE — 80053 COMPREHEN METABOLIC PANEL: CPT

## 2022-03-10 PROCEDURE — 77067 SCR MAMMO BI INCL CAD: CPT | Mod: TC | Performed by: RADIOLOGY

## 2022-03-10 PROCEDURE — 80061 LIPID PANEL: CPT

## 2022-03-15 ENCOUNTER — OFFICE VISIT (OUTPATIENT)
Dept: INTERNAL MEDICINE | Facility: CLINIC | Age: 72
End: 2022-03-15
Payer: COMMERCIAL

## 2022-03-15 VITALS
TEMPERATURE: 97.5 F | SYSTOLIC BLOOD PRESSURE: 132 MMHG | HEIGHT: 66 IN | OXYGEN SATURATION: 96 % | RESPIRATION RATE: 16 BRPM | BODY MASS INDEX: 27.64 KG/M2 | DIASTOLIC BLOOD PRESSURE: 80 MMHG | HEART RATE: 108 BPM | WEIGHT: 172 LBS

## 2022-03-15 DIAGNOSIS — R73.9 BLOOD GLUCOSE ELEVATED: ICD-10-CM

## 2022-03-15 DIAGNOSIS — Z12.11 SPECIAL SCREENING FOR MALIGNANT NEOPLASMS, COLON: ICD-10-CM

## 2022-03-15 DIAGNOSIS — R00.0 TACHYCARDIA: ICD-10-CM

## 2022-03-15 DIAGNOSIS — K75.9 HEPATITIS: ICD-10-CM

## 2022-03-15 DIAGNOSIS — Z00.00 MEDICARE ANNUAL WELLNESS VISIT, SUBSEQUENT: Primary | ICD-10-CM

## 2022-03-15 DIAGNOSIS — M19.91 PRIMARY OSTEOARTHRITIS, UNSPECIFIED SITE: ICD-10-CM

## 2022-03-15 DIAGNOSIS — E78.5 HYPERLIPIDEMIA LDL GOAL <100: ICD-10-CM

## 2022-03-15 DIAGNOSIS — Z78.0 ASYMPTOMATIC MENOPAUSAL STATE: ICD-10-CM

## 2022-03-15 DIAGNOSIS — I10 ESSENTIAL HYPERTENSION: ICD-10-CM

## 2022-03-15 LAB
ERYTHROCYTE [DISTWIDTH] IN BLOOD BY AUTOMATED COUNT: 13 % (ref 10–15)
HBA1C MFR BLD: 5.6 % (ref 0–5.6)
HCT VFR BLD AUTO: 49 % (ref 35–47)
HGB BLD-MCNC: 16 G/DL (ref 11.7–15.7)
MCH RBC QN AUTO: 28.2 PG (ref 26.5–33)
MCHC RBC AUTO-ENTMCNC: 32.7 G/DL (ref 31.5–36.5)
MCV RBC AUTO: 86 FL (ref 78–100)
PLATELET # BLD AUTO: 336 10E3/UL (ref 150–450)
RBC # BLD AUTO: 5.67 10E6/UL (ref 3.8–5.2)
WBC # BLD AUTO: 12.3 10E3/UL (ref 4–11)

## 2022-03-15 PROCEDURE — 84460 ALANINE AMINO (ALT) (SGPT): CPT | Performed by: INTERNAL MEDICINE

## 2022-03-15 PROCEDURE — 83036 HEMOGLOBIN GLYCOSYLATED A1C: CPT | Performed by: INTERNAL MEDICINE

## 2022-03-15 PROCEDURE — G0439 PPPS, SUBSEQ VISIT: HCPCS | Performed by: INTERNAL MEDICINE

## 2022-03-15 PROCEDURE — 84443 ASSAY THYROID STIM HORMONE: CPT | Performed by: INTERNAL MEDICINE

## 2022-03-15 PROCEDURE — 99214 OFFICE O/P EST MOD 30 MIN: CPT | Mod: 25 | Performed by: INTERNAL MEDICINE

## 2022-03-15 PROCEDURE — 36415 COLL VENOUS BLD VENIPUNCTURE: CPT | Performed by: INTERNAL MEDICINE

## 2022-03-15 PROCEDURE — 85027 COMPLETE CBC AUTOMATED: CPT | Performed by: INTERNAL MEDICINE

## 2022-03-15 ASSESSMENT — ENCOUNTER SYMPTOMS
MYALGIAS: 0
ARTHRALGIAS: 1
COUGH: 0
SORE THROAT: 0
HEMATURIA: 0
SHORTNESS OF BREATH: 1
NAUSEA: 0
HEMATOCHEZIA: 0
DYSURIA: 0
HEARTBURN: 0
BREAST MASS: 0
NERVOUS/ANXIOUS: 1
FEVER: 0
ABDOMINAL PAIN: 0
PARESTHESIAS: 1
CHILLS: 0
FREQUENCY: 0
DIARRHEA: 0
JOINT SWELLING: 0
EYE PAIN: 0
CONSTIPATION: 0
PALPITATIONS: 0
DIZZINESS: 0
HEADACHES: 0
WEAKNESS: 1

## 2022-03-15 ASSESSMENT — ACTIVITIES OF DAILY LIVING (ADL): CURRENT_FUNCTION: NO ASSISTANCE NEEDED

## 2022-03-15 NOTE — PROGRESS NOTES
"SUBJECTIVE:   Yandy Zamudio is a 72 year old female who presents for Preventive Visit and follow-up regarding hyperlipidemia, hypertension, osteoarthritis    Patient has been advised of split billing requirements and indicates understanding: Yes  Are you in the first 12 months of your Medicare coverage?  No    Healthy Habits:     In general, how would you rate your overall health?  Excellent    Frequency of exercise:  2-3 days/week    Duration of exercise:  15-30 minutes    Do you usually eat at least 4 servings of fruit and vegetables a day, include whole grains    & fiber and avoid regularly eating high fat or \"junk\" foods?  Yes    Taking medications regularly:  Yes    Medication side effects:  None    Ability to successfully perform activities of daily living:  No assistance needed    Home Safety:  No safety concerns identified    Hearing Impairment:  No hearing concerns    In the past 6 months, have you been bothered by leaking of urine?  No    In general, how would you rate your overall mental or emotional health?  Excellent      PHQ-2 Total Score: 1    Additional concerns today:  Yes    Do you feel safe in your environment? Yes    Have you ever done Advance Care Planning? (For example, a Health Directive, POLST, or a discussion with a medical provider or your loved ones about your wishes): Yes, patient states has an Advance Care Planning document and will bring a copy to the clinic.     Fall risk  Fallen 2 or more times in the past year?: No  Any fall with injury in the past year?: No    Cognitive Screening   1) Repeat 3 items (Leader, Season, Table)    2) Clock draw: NORMAL  3) 3 item recall: Recalls 3 objects  Results: Normal Clock-3 item recalls    Mini-CogTM Copyright ROLAN Baeza. Licensed by the author for use in [facility]; reprinted with permission (princess@.[facility]). All rights reserved.      Do you have sleep apnea, excessive snoring or daytime drowsiness?: no    Reviewed and updated as " needed this visit by clinical staff    Allergies               Reviewed and updated as needed this visit by Provider                 Social History     Tobacco Use     Smoking status: Never Smoker     Smokeless tobacco: Never Used   Substance Use Topics     Alcohol use: Yes     Comment: once in a while, small amount     If you drink alcohol do you typically have >3 drinks per day or >7 drinks per week? No    Alcohol Use 12/14/2020   Prescreen: >3 drinks/day or >7 drinks/week? -   Prescreen: >3 drinks/day or >7 drinks/week? No             Current providers sharing in care for this patient include:   Patient Care Team:  Omid Rivera MD as PCP - General  Omid Rivera MD as Assigned PCP  Mihai Viveros as Other (see comments) (Therapist)    The following health maintenance items are reviewed in Epic and correct as of today:  Health Maintenance Due   Topic Date Due     ANNUAL REVIEW OF HM ORDERS  Never done     MEDICARE ANNUAL WELLNESS VISIT  12/14/2021     FALL RISK ASSESSMENT  12/14/2021     Labs reviewed in EPIC     Component      Latest Ref Rng & Units 12/9/2020 3/10/2022   Sodium      133 - 144 mmol/L 138 138   Potassium      3.4 - 5.3 mmol/L 3.8 4.2   Chloride      94 - 109 mmol/L 107 104   Carbon Dioxide      20 - 32 mmol/L 27 27   Anion Gap      3 - 14 mmol/L 4 7   Glucose      70 - 99 mg/dL 99 114 (H)   Urea Nitrogen      7 - 30 mg/dL 18 17   Creatinine      0.52 - 1.04 mg/dL 0.64 0.74   GFR Estimate      >60 mL/min/1.73m2 >90 86   GFR Estimate If Black      >60 mL/min/1.73:m2 >90    Calcium      8.5 - 10.1 mg/dL 9.3 10.0   Bilirubin Total      0.2 - 1.3 mg/dL 0.5 0.6   Albumin      3.4 - 5.0 g/dL 4.0 4.0   Protein Total      6.8 - 8.8 g/dL 7.6 7.7   Alkaline Phosphatase      40 - 150 U/L 120    ALT      0 - 50 U/L 33 66 (H)   AST      0 - 45 U/L 25 36   Alkaline Phosphatase      40 - 150 U/L  108   Cholesterol      <200 mg/dL 178 172   Triglycerides      <150 mg/dL 198 (H) 241 (H)   HDL Cholesterol     "  >=50 mg/dL 53 50   LDL Cholesterol Calculated      <=100 mg/dL 85 74   Non HDL Cholesterol      <130 mg/dL 125 122   Patient Fasting > 8hrs?        Yes           Review of Systems   Constitutional: Negative for chills and fever.   HENT: Negative for congestion, ear pain, hearing loss and sore throat.    Eyes: Positive for visual disturbance. Negative for pain.   Respiratory: Negative for cough.    Cardiovascular: Negative for chest pain, palpitations and peripheral edema.   Gastrointestinal: Negative for abdominal pain, constipation, diarrhea, heartburn, hematochezia and nausea.   Breasts:  Negative for tenderness, breast mass and discharge.   Genitourinary: Negative for dysuria, frequency, genital sores, hematuria, pelvic pain, urgency, vaginal bleeding and vaginal discharge.   Musculoskeletal: Positive for arthralgias. Negative for joint swelling and myalgias.   Skin: Negative for rash.   Neurological: Positive for paresthesias. Negative for dizziness and headaches.   Psychiatric/Behavioral: Negative for mood changes. The patient is nervous/anxious.       Rare floaters in vision.  Normal acuity   Due for colonoscopy.  Denies seeing blood in her stools   Hx OA. On Meloxicam.  Occasional arthralgias but overall working okay   Slight tingling sensitivity in feet at times.  No symptoms today.   No recent back pain issues  Weight down 6 pounds from last visit    OBJECTIVE:   /80   Pulse 108   Temp 97.5  F (36.4  C) (Temporal)   Resp 16   Ht 1.676 m (5' 6\")   Wt 78 kg (172 lb)   LMP 07/27/2009   SpO2 96%   BMI 27.76 kg/m   Estimated body mass index is 28.73 kg/m  as calculated from the following:    Height as of 12/14/20: 1.676 m (5' 6\").    Weight as of 12/14/20: 80.7 kg (178 lb).  Physical Exam  Eye: PERRL, EOMI  HENT: ear canals and TM's normal and nose and mouth without ulcers or lesions   Neck: no adenopathy. Thyroid normal to palpation. No bruits  Pulm: Lungs clear to auscultation   CV: Regular   " Rhythm, slightly tachycardic rate.  No ectopy  GI: Soft, nontender, Normal active bowel sounds, No hepatosplenomegaly or masses palpable  Ext: Peripheral pulses intact. No edema.  Neuro: Normal strength and tone, sensory exam grossly normal to light touch sensation distal bilateral upper and lower extremities despite subjective symptoms.  Normal vibration sense.  Normal gait  MSK: DIP and PIP joints of hands bilaterally with osteoarthritis thickening.  No increased warmth erythema        ASSESSMENT / PLAN:   1. Medicare annual wellness visit, subsequent  Recent mammogram done.  Due for colon cancer screening.  Due for DEXA with hs minimal osteopenia for screening with menopausal state. Other healthcare maintenance up-to-date including vaccinations.  Screening labs as ordered  - TSH with free T4 reflex; Future  - CBC with platelets; Future  - TSH with free T4 reflex  - CBC with platelets    2. Hepatitis  Minimal ALT elevation with labs as above.  Needs lab recheck  - ALT; Future  - ALT    3. Blood glucose elevated  Mild glucose elevation.  Counseled regarding carbohydrate reduction.  A1c labs ordered  - Hemoglobin A1c; Future  - Hemoglobin A1c    4. Tachycardia  Mild tachycardia on exam today.  Denies feeling anxious.  Patient doing some walking so deconditioning seems less likely.  Denies any chest pain symptoms.  Labs as ordered to rule out thyroid or anemia component  - TSH with free T4 reflex; Future  - CBC with platelets; Future  - TSH with free T4 reflex  - CBC with platelets    5. Essential hypertension  Controlled.  Continue current medication    6. Hyperlipidemia LDL goal <100  Controlled except for elevated triglycerides.  Counseled regarding carbohydrate reduction.  Continue current statin therapy and repeat labs 1 year  - Comprehensive metabolic panel; Future  - Lipid panel reflex to direct LDL Fasting; Future    7. Special screening for malignant neoplasms, colon  Due for colon cancer screening.  Patient  "will call to schedule appointment  - Colonoscopy Screening  Referral; Future    8. Asymptomatic menopausal state   Postmenopausal.  Minimal osteopenia seen on previous DEXA 5 years ago.  Needs recheck  - DX Hip/Pelvis/Spine; Future    9. Primary osteoarthritis, unspecified site  Controlled.  Continue meloxicam as needed        Patient has been advised of split billing requirements and indicates understanding: Yes    COUNSELING:  Reviewed preventive health counseling, as reflected in patient instructions    Estimated body mass index is 28.73 kg/m  as calculated from the following:    Height as of 12/14/20: 1.676 m (5' 6\").    Weight as of 12/14/20: 80.7 kg (178 lb).        She reports that she has never smoked. She has never used smokeless tobacco.      Appropriate preventive services were discussed with this patient, including applicable screening as appropriate for cardiovascular disease, diabetes, osteopenia/osteoporosis, and glaucoma.  As appropriate for age/gender, discussed screening for colorectal cancer, prostate cancer, breast cancer, and cervical cancer. Checklist reviewing preventive services available has been given to the patient.    Reviewed patients plan of care and provided an AVS. The Basic Care Plan (routine screening as documented in Health Maintenance) for Yandy meets the Care Plan requirement. This Care Plan has been established and reviewed with the Patient and spouse.    Counseling Resources:  ATP IV Guidelines  Pooled Cohorts Equation Calculator  Breast Cancer Risk Calculator  Breast Cancer: Medication to Reduce Risk  FRAX Risk Assessment  ICSI Preventive Guidelines  Dietary Guidelines for Americans, 2010  USDA's MyPlate  ASA Prophylaxis  Lung CA Screening      PLAN:  Continue current medications  Labs today as ordered  Reduce calorie/carbohydrate (sugar, bread, potato, pasta, rice, alcohol etc)  intake in diet.  Increase color on your plate with fruits and vegetables. Increase  " frequency of walking or other aerobic exercise as able (goal is daily)  Screening colonoscopy  with  [facility]. Call  them at [phone] to schedule the procedure.   Bone density (DEXA)     This will be done at the [facility]. Call [phone] or use [employer] to schedule.   Schedule an eye exam appointment. Please ask the eye clinic to fax us a report of your eye exam to [phone], attention Dr Rivera  Pt was informed regarding extra E&M billing for management of new or established medical issues not related to today's wellness/screening visit    Omid Rivera MD  [facility]

## 2022-03-16 LAB
ALT SERPL W P-5'-P-CCNC: 48 U/L (ref 0–50)
TSH SERPL DL<=0.005 MIU/L-ACNC: 1.62 MU/L (ref 0.4–4)

## 2022-04-20 DIAGNOSIS — M19.90 OSTEOARTHRITIS, UNSPECIFIED OSTEOARTHRITIS TYPE, UNSPECIFIED SITE: ICD-10-CM

## 2022-04-21 RX ORDER — MELOXICAM 15 MG/1
TABLET ORAL
Qty: 90 TABLET | Refills: 1 | Status: SHIPPED | OUTPATIENT
Start: 2022-04-21 | End: 2022-10-20

## 2022-04-21 NOTE — TELEPHONE ENCOUNTER
Routing refill request to provider for review/approval because:   Patient is age 6-64 years  Labs out of range:    Lab Results   Component Value Date    WBC 12.3 03/15/2022    WBC 8.3 06/23/2020     Lab Results   Component Value Date    RBC 5.67 03/15/2022    RBC 5.33 06/23/2020     Lab Results   Component Value Date    HGB 16.0 03/15/2022    HGB 15.3 06/23/2020     Lab Results   Component Value Date    HCT 49.0 03/15/2022    HCT 47.4 06/23/2020     Lab Results   Component Value Date    MCV 86 03/15/2022    MCV 89 06/23/2020     Lab Results   Component Value Date    MCH 28.2 03/15/2022    MCH 28.7 06/23/2020     Lab Results   Component Value Date    MCHC 32.7 03/15/2022    MCHC 32.3 06/23/2020     Lab Results   Component Value Date    RDW 13.0 03/15/2022    RDW 12.9 06/23/2020     Lab Results   Component Value Date     03/15/2022     06/23/2020       Lynette Du RN

## 2022-05-03 DIAGNOSIS — E78.5 HYPERLIPIDEMIA LDL GOAL <100: ICD-10-CM

## 2022-05-04 RX ORDER — ATORVASTATIN CALCIUM 20 MG/1
TABLET, FILM COATED ORAL
Qty: 90 TABLET | Refills: 2 | Status: SHIPPED | OUTPATIENT
Start: 2022-05-04 | End: 2023-01-16

## 2022-08-05 ENCOUNTER — MYC MEDICAL ADVICE (OUTPATIENT)
Dept: INTERNAL MEDICINE | Facility: CLINIC | Age: 72
End: 2022-08-05

## 2022-08-15 ENCOUNTER — MYC MEDICAL ADVICE (OUTPATIENT)
Dept: INTERNAL MEDICINE | Facility: CLINIC | Age: 72
End: 2022-08-15

## 2022-08-17 NOTE — TELEPHONE ENCOUNTER
Please see mychart from patient and advise appropriate course of action.    Yumiko Youssef RN  [facility] Triage Nurse

## 2022-08-19 NOTE — TELEPHONE ENCOUNTER
Spoke with patient by phone.  MyChart message reviewed.  Patient will try stopping amlodipine also and will monitor blood pressure in the next few weeks.  If consistently greater than 135/80, will instead start patient on lisinopril by itself and remain off of hydrochlorothiazide and amlodipine.  If blood pressure doing fine however with her weight loss off of all blood pressure medication, then no therapy will be needed.  Patient will update me with a MyChart note again in a couple weeks

## 2022-09-21 ENCOUNTER — MYC MEDICAL ADVICE (OUTPATIENT)
Dept: INTERNAL MEDICINE | Facility: CLINIC | Age: 72
End: 2022-09-21

## 2022-09-21 DIAGNOSIS — I10 HYPERTENSION, UNSPECIFIED TYPE: Primary | ICD-10-CM

## 2022-09-21 RX ORDER — LISINOPRIL 20 MG/1
20 TABLET ORAL DAILY
Qty: 30 TABLET | Refills: 11 | Status: SHIPPED | OUTPATIENT
Start: 2022-09-21 | End: 2022-10-23 | Stop reason: DRUGHIGH

## 2022-09-25 PROBLEM — E78.5 HYPERLIPIDEMIA LDL GOAL <100: Status: ACTIVE | Noted: 2022-09-25

## 2022-09-25 PROBLEM — M19.91 PRIMARY OSTEOARTHRITIS, UNSPECIFIED SITE: Status: ACTIVE | Noted: 2022-09-25

## 2022-09-26 NOTE — PATIENT INSTRUCTIONS
Continue current medications  Labs today as ordered  Reduce calorie/carbohydrate (sugar, bread, potato, pasta, rice, alcohol etc)  intake in diet.  Increase color on your plate with fruits and vegetables. Increase  frequency of walking or other aerobic exercise as able (goal is daily)  Screening colonoscopy  with  [facility]. Call  them at [phone] to schedule the procedure.   Bone density (DEXA)     This will be done at the [facility]. Call [phone] or use [employer] to schedule.   Schedule an eye exam appointment. Please ask the eye clinic to fax us a report of your eye exam to [phone], attention Dr Rivera  Pt was informed regarding extra E&M billing for management of new or established medical issues not related to today's wellness/screening visit

## 2022-10-10 ENCOUNTER — ANCILLARY PROCEDURE (OUTPATIENT)
Dept: BONE DENSITY | Facility: CLINIC | Age: 72
End: 2022-10-10
Attending: INTERNAL MEDICINE
Payer: COMMERCIAL

## 2022-10-10 DIAGNOSIS — Z78.0 ASYMPTOMATIC MENOPAUSAL STATE: ICD-10-CM

## 2022-10-10 PROCEDURE — 77085 DXA BONE DENSITY AXL VRT FX: CPT | Performed by: INTERNAL MEDICINE

## 2022-10-16 ENCOUNTER — HEALTH MAINTENANCE LETTER (OUTPATIENT)
Age: 72
End: 2022-10-16

## 2022-10-17 DIAGNOSIS — D58.2 ELEVATED HEMOGLOBIN (H): Primary | ICD-10-CM

## 2022-10-17 DIAGNOSIS — M19.90 OSTEOARTHRITIS, UNSPECIFIED OSTEOARTHRITIS TYPE, UNSPECIFIED SITE: ICD-10-CM

## 2022-10-20 RX ORDER — MELOXICAM 15 MG/1
TABLET ORAL
Qty: 90 TABLET | Refills: 1 | Status: SHIPPED | OUTPATIENT
Start: 2022-10-20 | End: 2023-04-13

## 2022-10-20 NOTE — TELEPHONE ENCOUNTER
Routing refill request to provider for review/approval because:  Failed protocol due to:  age cbc    Ronan Beck RN  [facility] Triage Nurse

## 2022-10-20 NOTE — TELEPHONE ENCOUNTER
No history of GI symptoms with medication.  Normal GFR March 2022.  Working well for osteoarthritis.  Medication refilled 6 months

## 2022-10-21 ENCOUNTER — MYC MEDICAL ADVICE (OUTPATIENT)
Dept: INTERNAL MEDICINE | Facility: CLINIC | Age: 72
End: 2022-10-21

## 2022-10-21 DIAGNOSIS — I10 ESSENTIAL HYPERTENSION: Primary | ICD-10-CM

## 2022-10-23 RX ORDER — LISINOPRIL 40 MG/1
40 TABLET ORAL DAILY
Qty: 90 TABLET | Refills: 3 | Status: SHIPPED | OUTPATIENT
Start: 2022-10-23 | End: 2023-10-10

## 2023-01-15 DIAGNOSIS — E78.5 HYPERLIPIDEMIA LDL GOAL <100: ICD-10-CM

## 2023-01-16 RX ORDER — ATORVASTATIN CALCIUM 20 MG/1
TABLET, FILM COATED ORAL
Qty: 90 TABLET | Refills: 0 | Status: SHIPPED | OUTPATIENT
Start: 2023-01-16 | End: 2023-05-02

## 2023-02-13 ENCOUNTER — LAB (OUTPATIENT)
Dept: LAB | Facility: CLINIC | Age: 73
End: 2023-02-13
Payer: COMMERCIAL

## 2023-02-13 DIAGNOSIS — E78.5 HYPERLIPIDEMIA LDL GOAL <100: ICD-10-CM

## 2023-02-13 DIAGNOSIS — D58.2 ELEVATED HEMOGLOBIN (H): ICD-10-CM

## 2023-02-13 LAB
ALBUMIN SERPL BCG-MCNC: 4.7 G/DL (ref 3.5–5.2)
ALP SERPL-CCNC: 99 U/L (ref 35–104)
ALT SERPL W P-5'-P-CCNC: 37 U/L (ref 10–35)
ANION GAP SERPL CALCULATED.3IONS-SCNC: 13 MMOL/L (ref 7–15)
AST SERPL W P-5'-P-CCNC: 32 U/L (ref 10–35)
BILIRUB SERPL-MCNC: 0.6 MG/DL
BUN SERPL-MCNC: 10.1 MG/DL (ref 8–23)
CALCIUM SERPL-MCNC: 10.5 MG/DL (ref 8.8–10.2)
CHLORIDE SERPL-SCNC: 103 MMOL/L (ref 98–107)
CHOLEST SERPL-MCNC: 180 MG/DL
CREAT SERPL-MCNC: 0.7 MG/DL (ref 0.51–0.95)
DEPRECATED HCO3 PLAS-SCNC: 26 MMOL/L (ref 22–29)
ERYTHROCYTE [DISTWIDTH] IN BLOOD BY AUTOMATED COUNT: 13.8 % (ref 10–15)
GFR SERPL CREATININE-BSD FRML MDRD: >90 ML/MIN/1.73M2
GLUCOSE SERPL-MCNC: 100 MG/DL (ref 70–99)
HCT VFR BLD AUTO: 50.2 % (ref 35–47)
HDLC SERPL-MCNC: 64 MG/DL
HGB BLD-MCNC: 16.7 G/DL (ref 11.7–15.7)
LDLC SERPL CALC-MCNC: 86 MG/DL
MCH RBC QN AUTO: 29.1 PG (ref 26.5–33)
MCHC RBC AUTO-ENTMCNC: 33.3 G/DL (ref 31.5–36.5)
MCV RBC AUTO: 88 FL (ref 78–100)
NONHDLC SERPL-MCNC: 116 MG/DL
PLATELET # BLD AUTO: 259 10E3/UL (ref 150–450)
POTASSIUM SERPL-SCNC: 4.9 MMOL/L (ref 3.4–5.3)
PROT SERPL-MCNC: 7.3 G/DL (ref 6.4–8.3)
RBC # BLD AUTO: 5.73 10E6/UL (ref 3.8–5.2)
SODIUM SERPL-SCNC: 142 MMOL/L (ref 136–145)
TRIGL SERPL-MCNC: 150 MG/DL
WBC # BLD AUTO: 7.6 10E3/UL (ref 4–11)

## 2023-02-13 PROCEDURE — 80053 COMPREHEN METABOLIC PANEL: CPT

## 2023-02-13 PROCEDURE — 80061 LIPID PANEL: CPT

## 2023-02-13 PROCEDURE — 85027 COMPLETE CBC AUTOMATED: CPT

## 2023-02-13 PROCEDURE — 36415 COLL VENOUS BLD VENIPUNCTURE: CPT

## 2023-04-13 DIAGNOSIS — M19.90 OSTEOARTHRITIS, UNSPECIFIED OSTEOARTHRITIS TYPE, UNSPECIFIED SITE: ICD-10-CM

## 2023-04-13 RX ORDER — MELOXICAM 15 MG/1
TABLET ORAL
Qty: 90 TABLET | Refills: 0 | Status: SHIPPED | OUTPATIENT
Start: 2023-04-13 | End: 2023-07-09

## 2023-05-02 DIAGNOSIS — E78.5 HYPERLIPIDEMIA LDL GOAL <100: ICD-10-CM

## 2023-05-02 RX ORDER — ATORVASTATIN CALCIUM 20 MG/1
TABLET, FILM COATED ORAL
Qty: 90 TABLET | Refills: 0 | Status: SHIPPED | OUTPATIENT
Start: 2023-05-02 | End: 2023-07-21

## 2023-06-01 ENCOUNTER — HEALTH MAINTENANCE LETTER (OUTPATIENT)
Age: 73
End: 2023-06-01

## 2023-07-07 DIAGNOSIS — M19.90 OSTEOARTHRITIS, UNSPECIFIED OSTEOARTHRITIS TYPE, UNSPECIFIED SITE: ICD-10-CM

## 2023-07-09 RX ORDER — MELOXICAM 15 MG/1
TABLET ORAL
Qty: 90 TABLET | Refills: 0 | Status: SHIPPED | OUTPATIENT
Start: 2023-07-09 | End: 2023-10-18

## 2023-07-19 ENCOUNTER — TRANSFERRED RECORDS (OUTPATIENT)
Dept: HEALTH INFORMATION MANAGEMENT | Facility: CLINIC | Age: 73
End: 2023-07-19
Payer: COMMERCIAL

## 2023-07-19 ENCOUNTER — NURSE TRIAGE (OUTPATIENT)
Dept: INTERNAL MEDICINE | Facility: CLINIC | Age: 73
End: 2023-07-19
Payer: COMMERCIAL

## 2023-07-19 NOTE — TELEPHONE ENCOUNTER
"CC: Patient calling regarding symptoms reported in recent [employer] message - see [employer] message on 7/19/23:    \"In June I started experiencing breathlessness sometimes when working outside. On June 12 I tripped on a computer cord and ended up in the ER with 2 cuts above my left eye, total of 9 stitches (scans of neck & brain clear). After recovering from that I started working slowly outside again, but noted increasing breathlessness after less & less work. Today I helped [employer] outside and with only some minimal effort, leaning over and rising again, suddenly found myself totally breathless and sat down. When I went inside and used pulse ox meter, at 2pm, sitting down, my pulse was 141, O2 98%, but then when my pulse went down to 94 so did my O2 to 93%. Resting, my pulse varied from 75-85  with O2 at 96%. When I stood, pulse miguelangel to 109. Have been sitting for the last hour and my pulse is all over the place, from 68 to 88, and O2 levels from 94 to 97%\"    Breathing Difficulty:    RESPIRATORY STATUS: SOB with activity - slight SOB at rest   ONSET: about a month ago - started noticing these symptoms - currently worse   PATTERN: intermittent   SEVERITY:  Moderate. Mostly with activity but some at rest   RECURRENT SYMPTOM: denies   CARDIAC HISTORY: denies hx heart attack, angina, bypass surgery, angioplasty  LUNG HISTORY: denies hx pulmonary embolus, asthma, emphysema  CAUSE: unsure - concerned about heart issue   OTHER SYMPTOMS: denies dizziness, runny nose, cough, chest pain, fever. Does report ringing in ears   O2 SATURATION: currently 97% P 119    Denies feeling any palpitations    Triaged per Epic protocol, protocol for patient to go to ED now, advised patient to go to ED. Patient states she thought maybe she could just get a sooner appointment with PCP or see a cardiologist, reiterated recommendation to be seen in ED due to symptoms, patient expressed verbal understanding and is agreeable, will go to local ED now " (patient lives near [place]).    No further questions or concerns at this time.    Signing encounter.    Stephanie Capone RN  [facility]    Reason for Disposition    MODERATE difficulty breathing (e.g., speaks in phrases, SOB even at rest, pulse 100-120) of new-onset or worse than normal    Additional Information    Negative: SEVERE difficulty breathing (e.g., struggling for each breath, speaks in single words, pulse > 120)    Negative: Breathing stopped and hasn't returned    Negative: Choking on something    Negative: Bluish (or gray) lips or face    Negative: Difficult to awaken or acting confused (e.g., disoriented, slurred speech)    Negative: Passed out (i.e., fainted, collapsed and was not responding)    Negative: Wheezing started suddenly after medicine, an allergic food, or bee sting    Negative: Stridor    Negative: Slow, shallow and weak breathing    Negative: Sounds like a life-threatening emergency to the triager    Negative: Chest pain    Negative: Wheezing (high pitched whistling sound) and previous asthma attacks or use of asthma medicines    Negative: Difficulty breathing and within 14 days of COVID-19 Exposure    Negative: Difficulty breathing and only present when coughing    Negative: Difficulty breathing and only from stuffy nose    Negative: Difficulty breathing and only from stuffy nose or runny nose from common cold    Protocols used: BREATHING DIFFICULTY-A-OH

## 2023-07-21 DIAGNOSIS — E78.5 HYPERLIPIDEMIA LDL GOAL <100: ICD-10-CM

## 2023-07-21 RX ORDER — ATORVASTATIN CALCIUM 20 MG/1
20 TABLET, FILM COATED ORAL DAILY
Qty: 90 TABLET | Refills: 3 | Status: SHIPPED | OUTPATIENT
Start: 2023-07-21 | End: 2024-04-09

## 2023-08-08 ENCOUNTER — MYC MEDICAL ADVICE (OUTPATIENT)
Dept: INTERNAL MEDICINE | Facility: CLINIC | Age: 73
End: 2023-08-08
Payer: COMMERCIAL

## 2023-08-08 DIAGNOSIS — D75.9 DISEASE OF BLOOD AND BLOOD-FORMING ORGANS, UNSPECIFIED: ICD-10-CM

## 2023-08-08 DIAGNOSIS — E83.52 HYPERCALCEMIA: ICD-10-CM

## 2023-08-08 DIAGNOSIS — D58.2 HEMOGLOBINOPATHY (H): ICD-10-CM

## 2023-08-08 DIAGNOSIS — R71.8 OTHER ABNORMALITY OF RED BLOOD CELLS: ICD-10-CM

## 2023-08-08 DIAGNOSIS — D58.2 ELEVATED HEMOGLOBIN (H): Primary | ICD-10-CM

## 2023-08-15 ENCOUNTER — LAB (OUTPATIENT)
Dept: LAB | Facility: CLINIC | Age: 73
End: 2023-08-15
Payer: COMMERCIAL

## 2023-08-15 DIAGNOSIS — D58.2 HEMOGLOBINOPATHY (H): ICD-10-CM

## 2023-08-15 DIAGNOSIS — R71.8 OTHER ABNORMALITY OF RED BLOOD CELLS: ICD-10-CM

## 2023-08-15 DIAGNOSIS — D75.9 DISEASE OF BLOOD AND BLOOD-FORMING ORGANS, UNSPECIFIED: ICD-10-CM

## 2023-08-15 DIAGNOSIS — E83.52 HYPERCALCEMIA: ICD-10-CM

## 2023-08-15 LAB
ANION GAP SERPL CALCULATED.3IONS-SCNC: 10 MMOL/L (ref 7–15)
BASOPHILS # BLD AUTO: 0.1 10E3/UL (ref 0–0.2)
BASOPHILS NFR BLD AUTO: 1 %
BUN SERPL-MCNC: 11.4 MG/DL (ref 8–23)
CALCIUM SERPL-MCNC: 9.8 MG/DL (ref 8.8–10.2)
CHLORIDE SERPL-SCNC: 104 MMOL/L (ref 98–107)
CREAT SERPL-MCNC: 0.71 MG/DL (ref 0.51–0.95)
DEPRECATED HCO3 PLAS-SCNC: 26 MMOL/L (ref 22–29)
EOSINOPHIL # BLD AUTO: 0.2 10E3/UL (ref 0–0.7)
EOSINOPHIL NFR BLD AUTO: 2 %
ERYTHROCYTE [DISTWIDTH] IN BLOOD BY AUTOMATED COUNT: 12.5 % (ref 10–15)
FERRITIN SERPL-MCNC: 202 NG/ML (ref 11–328)
GFR SERPL CREATININE-BSD FRML MDRD: 90 ML/MIN/1.73M2
GLUCOSE SERPL-MCNC: 102 MG/DL (ref 70–99)
HCT VFR BLD AUTO: 49.5 % (ref 35–47)
HGB BLD-MCNC: 16.5 G/DL (ref 11.7–15.7)
IMM GRANULOCYTES # BLD: 0 10E3/UL
IMM GRANULOCYTES NFR BLD: 0 %
INTERPRETATION: NORMAL
LYMPHOCYTES # BLD AUTO: 2.3 10E3/UL (ref 0.8–5.3)
LYMPHOCYTES NFR BLD AUTO: 23 %
MCH RBC QN AUTO: 28.7 PG (ref 26.5–33)
MCHC RBC AUTO-ENTMCNC: 33.3 G/DL (ref 31.5–36.5)
MCV RBC AUTO: 86 FL (ref 78–100)
MONOCYTES # BLD AUTO: 0.6 10E3/UL (ref 0–1.3)
MONOCYTES NFR BLD AUTO: 6 %
NEUTROPHILS # BLD AUTO: 6.8 10E3/UL (ref 1.6–8.3)
NEUTROPHILS NFR BLD AUTO: 69 %
PLATELET # BLD AUTO: 246 10E3/UL (ref 150–450)
POTASSIUM SERPL-SCNC: 4.7 MMOL/L (ref 3.4–5.3)
PTH-INTACT SERPL-MCNC: 22 PG/ML (ref 15–65)
RBC # BLD AUTO: 5.75 10E6/UL (ref 3.8–5.2)
RETICS # AUTO: 0.07 10E6/UL (ref 0.03–0.1)
RETICS/RBC NFR AUTO: 1.2 % (ref 0.5–2)
SIGNIFICANT RESULTS: NORMAL
SODIUM SERPL-SCNC: 140 MMOL/L (ref 136–145)
SPECIMEN DESCRIPTION: NORMAL
TEST DETAILS, MDL: NORMAL
WBC # BLD AUTO: 9.8 10E3/UL (ref 4–11)

## 2023-08-15 PROCEDURE — G0452 MOLECULAR PATHOLOGY INTERPR: HCPCS | Mod: 59 | Performed by: PATHOLOGY

## 2023-08-15 PROCEDURE — 81338 MPL GENE COMMON VARIANTS: CPT

## 2023-08-15 PROCEDURE — 83970 ASSAY OF PARATHORMONE: CPT | Performed by: INTERNAL MEDICINE

## 2023-08-15 PROCEDURE — 85025 COMPLETE CBC W/AUTO DIFF WBC: CPT | Performed by: INTERNAL MEDICINE

## 2023-08-15 PROCEDURE — 81270 JAK2 GENE: CPT | Performed by: INTERNAL MEDICINE

## 2023-08-15 PROCEDURE — 82728 ASSAY OF FERRITIN: CPT | Performed by: INTERNAL MEDICINE

## 2023-08-15 PROCEDURE — 99000 SPECIMEN HANDLING OFFICE-LAB: CPT | Performed by: INTERNAL MEDICINE

## 2023-08-15 PROCEDURE — 36415 COLL VENOUS BLD VENIPUNCTURE: CPT | Performed by: INTERNAL MEDICINE

## 2023-08-15 PROCEDURE — 82668 ASSAY OF ERYTHROPOIETIN: CPT | Mod: 90 | Performed by: INTERNAL MEDICINE

## 2023-08-15 PROCEDURE — 85045 AUTOMATED RETICULOCYTE COUNT: CPT | Performed by: INTERNAL MEDICINE

## 2023-08-15 PROCEDURE — 80048 BASIC METABOLIC PNL TOTAL CA: CPT | Performed by: INTERNAL MEDICINE

## 2023-08-15 PROCEDURE — 81219 CALR GENE COM VARIANTS: CPT

## 2023-08-15 PROCEDURE — 99207 BLOOD MORPHOLOGY PATHOLOGIST REVIEW: CPT | Performed by: PATHOLOGY

## 2023-08-15 PROCEDURE — 81339 MPL GENE SEQ ALYS EXON 10: CPT

## 2023-08-16 LAB
EPO SERPL-ACNC: 7 MU/ML
PATH REPORT.COMMENTS IMP SPEC: NORMAL
PATH REPORT.FINAL DX SPEC: NORMAL
PATH REPORT.MICROSCOPIC SPEC OTHER STN: NORMAL
PATH REPORT.MICROSCOPIC SPEC OTHER STN: NORMAL

## 2023-08-21 ASSESSMENT — ENCOUNTER SYMPTOMS
HEARTBURN: 0
DIZZINESS: 0
DIARRHEA: 0
PALPITATIONS: 0
FEVER: 0
NAUSEA: 0
SHORTNESS OF BREATH: 1
HEMATURIA: 0
BREAST MASS: 0
CHILLS: 0
MYALGIAS: 0
NERVOUS/ANXIOUS: 1
ARTHRALGIAS: 0
HEADACHES: 0
DYSURIA: 0
JOINT SWELLING: 0
COUGH: 0
EYE PAIN: 0
HEMATOCHEZIA: 0
CONSTIPATION: 0
FREQUENCY: 0
PARESTHESIAS: 0
ABDOMINAL PAIN: 0
WEAKNESS: 0
SORE THROAT: 0

## 2023-08-21 ASSESSMENT — ACTIVITIES OF DAILY LIVING (ADL): CURRENT_FUNCTION: NO ASSISTANCE NEEDED

## 2023-08-22 ENCOUNTER — OFFICE VISIT (OUTPATIENT)
Dept: INTERNAL MEDICINE | Facility: CLINIC | Age: 73
End: 2023-08-22
Payer: COMMERCIAL

## 2023-08-22 VITALS
BODY MASS INDEX: 25.6 KG/M2 | WEIGHT: 159.3 LBS | TEMPERATURE: 98.6 F | HEART RATE: 104 BPM | DIASTOLIC BLOOD PRESSURE: 76 MMHG | HEIGHT: 66 IN | OXYGEN SATURATION: 99 % | SYSTOLIC BLOOD PRESSURE: 142 MMHG

## 2023-08-22 DIAGNOSIS — M85.80 OSTEOPENIA, UNSPECIFIED LOCATION: ICD-10-CM

## 2023-08-22 DIAGNOSIS — Z12.31 ENCOUNTER FOR SCREENING MAMMOGRAM FOR BREAST CANCER: ICD-10-CM

## 2023-08-22 DIAGNOSIS — Z00.00 MEDICARE ANNUAL WELLNESS VISIT, SUBSEQUENT: ICD-10-CM

## 2023-08-22 DIAGNOSIS — E78.5 HYPERLIPIDEMIA LDL GOAL <100: ICD-10-CM

## 2023-08-22 DIAGNOSIS — Z12.11 SPECIAL SCREENING FOR MALIGNANT NEOPLASMS, COLON: ICD-10-CM

## 2023-08-22 DIAGNOSIS — R06.02 SOB (SHORTNESS OF BREATH): ICD-10-CM

## 2023-08-22 DIAGNOSIS — R00.0 TACHYCARDIA: ICD-10-CM

## 2023-08-22 DIAGNOSIS — D58.2 HEMOGLOBINOPATHY (H): ICD-10-CM

## 2023-08-22 DIAGNOSIS — I10 ESSENTIAL HYPERTENSION: ICD-10-CM

## 2023-08-22 PROCEDURE — G0439 PPPS, SUBSEQ VISIT: HCPCS | Performed by: INTERNAL MEDICINE

## 2023-08-22 PROCEDURE — 99214 OFFICE O/P EST MOD 30 MIN: CPT | Mod: 25 | Performed by: INTERNAL MEDICINE

## 2023-08-22 RX ORDER — METOPROLOL SUCCINATE 25 MG/1
25 TABLET, EXTENDED RELEASE ORAL DAILY
Qty: 30 TABLET | Refills: 11 | Status: SHIPPED | OUTPATIENT
Start: 2023-08-22 | End: 2024-04-09

## 2023-08-22 ASSESSMENT — ENCOUNTER SYMPTOMS
CONSTIPATION: 0
HEARTBURN: 0
HEADACHES: 0
NAUSEA: 0
CHILLS: 0
DYSURIA: 0
PALPITATIONS: 0
DIZZINESS: 0
ARTHRALGIAS: 0
FEVER: 0
DIARRHEA: 0
ABDOMINAL PAIN: 0
NERVOUS/ANXIOUS: 1
FREQUENCY: 0
COUGH: 0
HEMATOCHEZIA: 0
WEAKNESS: 0
HEMATURIA: 0
SORE THROAT: 0
JOINT SWELLING: 0
BREAST MASS: 0
PARESTHESIAS: 0
MYALGIAS: 0
EYE PAIN: 0
SHORTNESS OF BREATH: 1

## 2023-08-22 ASSESSMENT — ACTIVITIES OF DAILY LIVING (ADL): CURRENT_FUNCTION: NO ASSISTANCE NEEDED

## 2023-08-22 NOTE — PROGRESS NOTES
"SUBJECTIVE:   Beatrice is a 73 year old who presents for Preventive Visit, follow-up ER visit from 7/19/2023 and discussion of multiple medical issues as below      Are you in the first 12 months of your Medicare coverage?  No    Healthy Habits:     In general, how would you rate your overall health?  Fair    Frequency of exercise:  2-3 days/week    Duration of exercise:  15-30 minutes    Do you usually eat at least 4 servings of fruit and vegetables a day, include whole grains    & fiber and avoid regularly eating high fat or \"junk\" foods?  Yes    Taking medications regularly:  Yes    Medication side effects:  None    Ability to successfully perform activities of daily living:  No assistance needed    Home Safety:  No safety concerns identified    Hearing Impairment:  No hearing concerns    In the past 6 months, have you been bothered by leaking of urine?  No    In general, how would you rate your overall mental or emotional health?  Excellent    Additional concerns today:  Yes        Have you ever done Advance Care Planning? (For example, a Health Directive, POLST, or a discussion with a medical provider or your loved ones about your wishes): No, advance care planning information given to patient to review.  Patient plans to discuss their wishes with loved ones or provider.         Fall risk  Fallen 2 or more times in the past year?: Yes  Any fall with injury in the past year?: Yes    Cognitive Screening   1) Repeat 3 items (Leader, Season, Table)    2) Clock draw: NORMAL  3) 3 item recall: Recalls 3 objects  Results: 3 items recalled: COGNITIVE IMPAIRMENT LESS LIKELY    Mini-CogTM Copyright ROLAN Baeza. Licensed by the author for use in [facility]; reprinted with permission ([email or web address]@.[facility]). All rights reserved.      Do you have sleep apnea, excessive snoring or daytime drowsiness? : no    Reviewed and updated as needed this visit by clinical staff   Tobacco  Allergies  Meds              Reviewed and " updated as needed this visit by Provider                 Social History     Tobacco Use    Smoking status: Never    Smokeless tobacco: Never   Substance Use Topics    Alcohol use: Not Currently     Comment: once in a while, small amount             8/21/2023     1:50 PM   Alcohol Use   Prescreen: >3 drinks/day or >7 drinks/week? No     Do you have a current opioid prescription? No  Do you use any other controlled substances or medications that are not prescribed by a provider? None              Current providers sharing in care for this patient include:   Patient Care Team:  Omid Rivera MD as PCP - General  Omid Rivera MD as Assigned PCP  Mihai Viveros LMFT as Other (see comments) (Therapist)  Guy Lamb MD as MD (Hematology)  Ashley Darden APRN CNP as Pulmonologist (Pulmonary Disease)    The following health maintenance items are reviewed in Epic and correct as of today:  Health Maintenance   Topic Date Due    ANNUAL REVIEW OF HM ORDERS  Never done    MEDICARE ANNUAL WELLNESS VISIT  03/15/2023    INFLUENZA VACCINE (1) 09/01/2023    LIPID  02/13/2024    BMP  02/15/2024    COLORECTAL CANCER SCREENING  05/22/2024    FALL RISK ASSESSMENT  08/22/2024    MAMMO SCREENING  08/29/2025    DEXA  10/10/2027    ADVANCE CARE PLANNING  08/22/2028    DTAP/TDAP/TD IMMUNIZATION (3 - Td or Tdap) 01/28/2030    HEPATITIS C SCREENING  Completed    PHQ-2 (once per calendar year)  Completed    Pneumococcal Vaccine: 65+ Years  Completed    ZOSTER IMMUNIZATION  Completed    COVID-19 Vaccine  Completed    IPV IMMUNIZATION  Aged Out    HPV IMMUNIZATION  Aged Out    MENINGITIS IMMUNIZATION  Aged Out     Labs reviewed in EPIC          Review of Systems   Constitutional:  Negative for chills and fever.   HENT:  Negative for congestion, ear pain, hearing loss and sore throat.    Eyes:  Positive for visual disturbance. Negative for pain.   Respiratory:  Positive for shortness of breath. Negative for cough.    Cardiovascular:   "Negative for chest pain, palpitations and peripheral edema.   Gastrointestinal:  Negative for abdominal pain, constipation, diarrhea, heartburn, hematochezia and nausea.   Breasts:  Negative for tenderness, breast mass and discharge.   Genitourinary:  Negative for dysuria, frequency, genital sores, hematuria, pelvic pain, urgency, vaginal bleeding and vaginal discharge.   Musculoskeletal:  Negative for arthralgias, joint swelling and myalgias.   Skin:  Negative for rash.   Neurological:  Negative for dizziness, weakness, headaches and paresthesias.   Psychiatric/Behavioral:  Negative for mood changes. The patient is nervous/anxious.      Has glasses  Has ringing ears. Affecting sleep  One cup coffee/day   Can go for longer walk and no shortness of breath but lean over or getting up will cause SOB and have elevated HR such as bending over to  sticks  Weight down 20 pounds with diet   No chest pain with general walking flat surfaces  Blotchy appearance  of skin lower legs after showering.    Overall mood \"wonderful\" celebrating 50th anniversaryin 2 weeks    Patient was seen in the ER on 7/19/2023 through [facility] system.  Note reviewed in chart.  Hemoglobin at that time 17.0 with normal white count, platelet, D-dimer, TSH, kidney, liver.  Presented at that time with a heart rate of 115 on EKG and sinus tachycardia.  Nonspecific ST change.  Had noticed some palpitations prior to that ER visit and some shortness of breath.  Chest x-ray was negative.   History of previous palpitations showing SVT back in 2020.  Negative stress echo and ECHO  at that time.  Has had a chronic elevation of hemoglobin.      Underwent recent lab testing 8/15/23 as below:    Final Diagnosis   Peripheral blood, morphology:  - Quantitatively increased hemoglobin; erythrocytes without diagnostic morphologic abnormality.  - WBC subsets quantitatively within normal limits and without diagnostic morphologic abnormality.  - Platelets " quantitatively within normal limits and without diagnostic morphologic abnormality.  - Negative for schistocytes and definitive morphologic features of hemolysis.   - Negative for overt features of myelodysplasia and circulating blasts.  - Molecular studies (JAK2 with reflex to MPL CALR MPN NGS panel) are pending and results will be reported separately by the performing laboratory; correlation with those results is recommended.        MPL CALR NGS Reflex Panel: [id number]  Order: [id number]  Collected 8/15/2023  1:10 PM       Status: Final result       Visible to patient: Yes (seen)       Dx: Hemoglobinopathy (H); Disease of bloo...    0 Result Notes      Component    Specimen Description    Blood: ACD   Significant Results    Detected Alterations of Known or Potential Pathogenicity: None     TMB Score: None   Interpretation    No mutations were identified in CALR or MPL genes and previous JAK2 gene testing was negative.          Component      Latest Ref Rng 8/15/2023  1:18 PM   Sodium      136 - 145 mmol/L 140    Potassium      3.4 - 5.3 mmol/L 4.7    Chloride      98 - 107 mmol/L 104    Carbon Dioxide (CO2)      22 - 29 mmol/L 26    Anion Gap      7 - 15 mmol/L 10    Urea Nitrogen      8.0 - 23.0 mg/dL 11.4    Creatinine      0.51 - 0.95 mg/dL 0.71    Calcium      8.8 - 10.2 mg/dL 9.8    Glucose      70 - 99 mg/dL 102 (H)    GFR Estimate      >60 mL/min/1.73m2 90    Specimen Description This result contains rich text formatting which cannot be displayed here.    Significant Results This result contains rich text formatting which cannot be displayed here.    Interpretation This result contains rich text formatting which cannot be displayed here.    Test Details This result contains rich text formatting which cannot be displayed here.    Parathyroid Hormone Intact      15 - 65 pg/mL 22    Erythropoietin      4 - 27 mU/mL 7    Ferritin      11 - 328 ng/mL 202       Legend:  (H) High      Component      Latest Ref  "Rng 2/13/2023  1:11 PM   Sodium      136 - 145 mmol/L 142    Potassium      3.4 - 5.3 mmol/L 4.9    Chloride      98 - 107 mmol/L 103    Carbon Dioxide (CO2)      22 - 29 mmol/L 26    Anion Gap      7 - 15 mmol/L 13    Urea Nitrogen      8.0 - 23.0 mg/dL 10.1    Creatinine      0.51 - 0.95 mg/dL 0.70    Calcium      8.8 - 10.2 mg/dL 10.5 (H)    Glucose      70 - 99 mg/dL 100 (H)    Alkaline Phosphatase      35 - 104 U/L 99    AST      10 - 35 U/L 32    ALT      10 - 35 U/L 37 (H)    Protein Total      6.4 - 8.3 g/dL 7.3    Albumin      3.5 - 5.2 g/dL 4.7    Bilirubin Total      <=1.2 mg/dL 0.6    GFR Estimate      >60 mL/min/1.73m2 >90    WBC      4.0 - 11.0 10e3/uL 7.6    RBC Count      3.80 - 5.20 10e6/uL 5.73 (H)    Hemoglobin      11.7 - 15.7 g/dL 16.7 (H)    Hematocrit      35.0 - 47.0 % 50.2 (H)    MCV      78 - 100 fL 88    MCH      26.5 - 33.0 pg 29.1    MCHC      31.5 - 36.5 g/dL 33.3    RDW      10.0 - 15.0 % 13.8    Platelet Count      150 - 450 10e3/uL 259    Cholesterol      <200 mg/dL 180    Triglycerides      <150 mg/dL 150 (H)    HDL Cholesterol      >=50 mg/dL 64    LDL Cholesterol Calculated      <=100 mg/dL 86    Non HDL Cholesterol      <130 mg/dL 116       Legend:  (H) High      OBJECTIVE:   BP (!) 142/76   Pulse 104   Temp 98.6  F (37  C) (Oral)   Ht 1.676 m (5' 6\")   Wt 72.3 kg (159 lb 4.8 oz)   LMP 07/27/2009   SpO2 99%   BMI 25.71 kg/m   Estimated body mass index is 25.71 kg/m  as calculated from the following:    Height as of this encounter: 1.676 m (5' 6\").    Weight as of this encounter: 72.3 kg (159 lb 4.8 oz).  Physical Exam  General appearance - alert, no distress  Skin - No rashes or lesions.  Head - normocephalic, atraumatic  Eyes - CHRISTINA, EOMI, fundi exam with nondilated pupils negative.  Ears - External ears normal. Canals clear. TM's normal.  Nose/Sinuses - Nares normal. Septum midline. Mucosa normal. No drainage or sinus tenderness.  Oropharynx - No erythema, no " adenopathy, no exudates.  Neck - Supple without adenopathy or thyromegaly. No bruits.  Lungs - Clear to auscultation without wheezes/rhonchi.  Heart - Slight tachycardia, regular rhythm without   clicks, or gallops. 1/6 ISMAEL  [place]  Nodes - No supraclavicular, axillary, or inguinal adenopathy palpable.  Breasts - deferred  Abdomen - Abdomen soft, non-tender. BS normal. No masses or hepatosplenomegaly palpable. No bruits.  Extremities -No cyanosis, clubbing or edema.    Musculoskeletal - Spine ROM normal. Muscular strength intact.  DIP and PIP joints hands with mild osteoarthritis thickening  Peripheral pulses - radial=4/4, femoral=4/4, posterior tibial=4/4, dorsalis pedis=4/4,  Neuro - Gait normal. Reflexes normal and symmetric. Sensation grossly WNL.  Genital/Rectal - deferred      ASSESSMENT / PLAN:   1. Medicare annual wellness visit, subsequent  See plan discussion below for healthcare maintenance.  Otherwise up-to-date for age    2. Tachycardia  Recent thyroid function normal.   Recent D-dimer negative in ER  No new murmur on heart auscultation compared to findings from echo 2020.  Not edema, orthopnea symptoms of heart failure and not seen on chest x-ray imaging recently denies any chest pain with exertion and mild tachycardia present at rest.  No obvious PV based on genetic testing above.  O2 sats OK and low EPO level. Will start low-dose beta-blocker.  May need future repeat echo and cardiac stress testing but ? if elevated hemoglobin/HCT is primary force driving tachycardia. Will have pt see hematology and pulmonary. If hematology doesn't feel sx may be elevated Hgb as primary driving force, will order ECHO and stress testing in addition  - metoprolol succinate ER (TOPROL XL) 25 MG 24 hr tablet; Take 1 tablet (25 mg) by mouth daily  Dispense: 30 tablet; Refill: 11    3. Hemoglobinopathy (H)   Normal EPO. No mutations were identified in CALR or MPL genes and previous JAK2 gene testing was negative. O2 sats OK  and CXR and D dimer OK. . Pt to see hematology about cause and whether trial of phlebotomy  a reasonable option  from both a diagnostic and therapeutic basis.  (See  encounter 9/8/23 with order for hematology consult. At time of this actual appt, JAK2 results were still pending so referral orders were not placed initially but  JAK2 results able to be seen since that time and negative as above)    4. Essential hypertension  Controlled. Continue Lisinopril as recently refilled  - Comprehensive metabolic panel; Future    5. Hyperlipidemia LDL goal <100  Controlled. Continue Atorvastatin as recently refilled  - Lipid panel reflex to direct LDL Fasting; Future  - Comprehensive metabolic panel; Future    6. SOB (shortness of breath)   See #3 for discussion . Pt will see pulmonary with PFTs being done. CXR negative. ? If reduced diffusion contributing to  SOB but less likely with nonsmoker and normal EPO level (See  encounter 9/8/23 with order for pulmonary consult. At time of this actual appt, JAK2 results were still pending so referral orders were not placed initially but  JAK2 results able to be seen since that time and negative as above).  If hematology and pulmonary consultations without cause, will repeat cardiac stress testing and ECHO      7. Osteopenia, unspecified location   Most recent DEXA reviewed. Not to point of needing Rx. Repeat DEXA 2025    8. Special screening for malignant neoplasms, colon  Candidate for screening. No sx  - Colonoscopy Screening  Referral; Future    9. Encounter for screening mammogram for breast cancer   Has mammogram scheduled for later this month as previously ordered      Patient has been advised of split billing requirements and indicates understanding: Yes      COUNSELING:  Reviewed preventive health counseling, as reflected in patient instructions        She reports that she has never smoked. She has never used smokeless tobacco.      Appropriate preventive services  were discussed with this patient, including applicable screening as appropriate for cardiovascular disease, diabetes, osteopenia/osteoporosis, and glaucoma.  As appropriate for age/gender, discussed screening for colorectal cancer, prostate cancer, breast cancer, and cervical cancer. Checklist reviewing preventive services available has been given to the patient.    Reviewed patients plan of care and provided an AVS. The Basic Care Plan (routine screening as documented in Health Maintenance) for Yandy meets the Care Plan requirement. This Care Plan has been established and reviewed with the Patient and spouse.     PLAN:  Continue current medications  Add Metoprolol XL 25mg tab, 1 tab daily for elevated heart rate  Screening colonoscopy  with  [facility]. Call  them at [phone] to schedule the procedure.   Schedule an eye exam appointment. Please ask the eye clinic to fax us a report of your eye exam to [phone], attention Dr Miguel Hough  booster  and influenza/flu vaccinations in the  Fall (mid October).  May get through a pharmacy or at clinic  Referral to Pulmonary re: shortness of breath. They will call to schedule. If you don t hear from a representative within 2 business  days, please call [phone].   Referral to Hematology re: elevated hemoglobin and shortness of breath.  They will call to schedule.  If you don t hear from a representative within 2 business days, please call [phone].  If hematology and pulmonary consultation without cause for shortness of breath cause, will then order future cardiac stress tst and cardiac ECHO. Will await consultation notes and their recommendations  Mammogram later this month as scheduled  Call  [phone] or use Number [phone] to schedule a future lab appointment  fasting in 1 year for cholesterol management.   For fasting labs, please refrain from eating for 8 hours or more.   Drink 2 glasses of water before your lab appointment. It is fine to  take your  oral medications on the morning of the lab test as usual  Pt was informed regarding extra E&M billing for management of new or established medical issues not related to today's wellness/screening visit      Omid Rivera MD  [facility]    Identified Health Risks:  I have reviewed Opioid Use Disorder and Substance Use Disorder risk factors and made any needed referrals.   The patient was provided with suggestions to help her develop a healthy physical lifestyle.

## 2023-08-23 ENCOUNTER — MYC MEDICAL ADVICE (OUTPATIENT)
Dept: INTERNAL MEDICINE | Facility: CLINIC | Age: 73
End: 2023-08-23
Payer: COMMERCIAL

## 2023-08-25 LAB
INTERPRETATION: NORMAL
SIGNIFICANT RESULTS: NORMAL
SPECIMEN DESCRIPTION: NORMAL
TEST DETAILS, MDL: NORMAL

## 2023-08-29 ENCOUNTER — ANCILLARY PROCEDURE (OUTPATIENT)
Dept: MAMMOGRAPHY | Facility: CLINIC | Age: 73
End: 2023-08-29
Attending: INTERNAL MEDICINE
Payer: COMMERCIAL

## 2023-08-29 DIAGNOSIS — Z12.31 ENCOUNTER FOR SCREENING MAMMOGRAM FOR BREAST CANCER: ICD-10-CM

## 2023-08-29 DIAGNOSIS — Z12.31 VISIT FOR SCREENING MAMMOGRAM: ICD-10-CM

## 2023-08-29 PROCEDURE — 77063 BREAST TOMOSYNTHESIS BI: CPT | Mod: TC | Performed by: RADIOLOGY

## 2023-08-29 PROCEDURE — 77067 SCR MAMMO BI INCL CAD: CPT | Mod: TC | Performed by: RADIOLOGY

## 2023-09-08 ENCOUNTER — MYC MEDICAL ADVICE (OUTPATIENT)
Dept: INTERNAL MEDICINE | Facility: CLINIC | Age: 73
End: 2023-09-08
Payer: COMMERCIAL

## 2023-09-08 DIAGNOSIS — D58.2 HEMOGLOBINOPATHY (H): ICD-10-CM

## 2023-09-08 DIAGNOSIS — R06.02 SOB (SHORTNESS OF BREATH): Primary | ICD-10-CM

## 2023-09-08 NOTE — TELEPHONE ENCOUNTER
Please see [employer] message and advise. Recommend additional VV to discuss symptoms? Thanks.

## 2023-09-12 ENCOUNTER — PATIENT OUTREACH (OUTPATIENT)
Dept: ONCOLOGY | Facility: CLINIC | Age: 73
End: 2023-09-12
Payer: COMMERCIAL

## 2023-09-12 DIAGNOSIS — R06.02 SOB (SHORTNESS OF BREATH): Primary | ICD-10-CM

## 2023-09-12 NOTE — PROGRESS NOTES
Hematology referral reviewed for [facility], see below.    Referral reason: elevated Hgb and hematocrit. Hgb 16.5 and hematocrit 49.5 dated 8/15/23, both elevated since 2022. Both JAK2 and MPL CALR negative.    Current abnormal labs: Available in Chart Review    Outreach: [place] sent to patient    Plan: Triage instructions updated and sent to NPS for completion.

## 2023-09-12 NOTE — TELEPHONE ENCOUNTER
RECORDS STATUS - ALL OTHER DIAGNOSIS    Elevated Hemoglobin  RECORDS RECEIVED FROM:    Appt Date: 9/14/2023 with Dr. Lamb    NOTES STATUS DETAILS   OFFICE NOTE from referring provider Complete    Omid Rivera MD      DISCHARGE SUMMARY from hospital     DISCHARGE REPORT from the ER     MEDICATION LIST Complete [facility]   CLINICAL TRIAL TREATMENTS TO DATE     LABS     PATHOLOGY REPORTS     ANYTHING RELATED TO DIAGNOSIS Complete Labs last updated on 8/15/2023    GENONOMIC TESTING     TYPE:     IMAGING (NEED IMAGES & REPORT)     CT SCANS     MRI     MAMMO     ULTRASOUND     PET

## 2023-09-13 PROBLEM — R06.02 SOB (SHORTNESS OF BREATH): Status: ACTIVE | Noted: 2023-09-13

## 2023-09-13 PROBLEM — M85.80 OSTEOPENIA, UNSPECIFIED LOCATION: Status: ACTIVE | Noted: 2023-09-13

## 2023-09-14 ENCOUNTER — LAB (OUTPATIENT)
Dept: INFUSION THERAPY | Facility: CLINIC | Age: 73
End: 2023-09-14
Attending: INTERNAL MEDICINE
Payer: COMMERCIAL

## 2023-09-14 ENCOUNTER — HOSPITAL ENCOUNTER (OUTPATIENT)
Dept: ULTRASOUND IMAGING | Facility: CLINIC | Age: 73
Discharge: HOME | End: 2023-09-14
Attending: INTERNAL MEDICINE
Payer: COMMERCIAL

## 2023-09-14 ENCOUNTER — PRE VISIT (OUTPATIENT)
Dept: ONCOLOGY | Facility: HOSPITAL | Age: 73
End: 2023-09-14
Payer: COMMERCIAL

## 2023-09-14 ENCOUNTER — ONCOLOGY VISIT (OUTPATIENT)
Dept: ONCOLOGY | Facility: CLINIC | Age: 73
End: 2023-09-14
Attending: INTERNAL MEDICINE
Payer: COMMERCIAL

## 2023-09-14 VITALS
HEART RATE: 93 BPM | HEIGHT: 66 IN | DIASTOLIC BLOOD PRESSURE: 89 MMHG | WEIGHT: 157.3 LBS | OXYGEN SATURATION: 95 % | SYSTOLIC BLOOD PRESSURE: 160 MMHG | BODY MASS INDEX: 25.28 KG/M2

## 2023-09-14 DIAGNOSIS — D75.1 ERYTHROCYTOSIS: ICD-10-CM

## 2023-09-14 DIAGNOSIS — D58.2 HEMOGLOBINOPATHY (H): ICD-10-CM

## 2023-09-14 DIAGNOSIS — D75.1 ERYTHROCYTOSIS: Primary | ICD-10-CM

## 2023-09-14 DIAGNOSIS — R06.02 SOB (SHORTNESS OF BREATH): ICD-10-CM

## 2023-09-14 LAB
ALBUMIN UR-MCNC: NEGATIVE MG/DL
APPEARANCE UR: CLEAR
BACTERIA #/AREA URNS HPF: ABNORMAL /HPF
BILIRUB UR QL STRIP: NEGATIVE
COLOR UR AUTO: ABNORMAL
GLUCOSE UR STRIP-MCNC: NEGATIVE MG/DL
HGB UR QL STRIP: NEGATIVE
KETONES UR STRIP-MCNC: NEGATIVE MG/DL
LEUKOCYTE ESTERASE UR QL STRIP: ABNORMAL
MUCOUS THREADS #/AREA URNS LPF: PRESENT /LPF
NITRATE UR QL: NEGATIVE
PH UR STRIP: 6.5 [PH] (ref 5–7)
RBC URINE: 1 /HPF
SP GR UR STRIP: 1.01 (ref 1–1.03)
SQUAMOUS EPITHELIAL: 4 /HPF
TRANSITIONAL EPI: <1 /HPF
UROBILINOGEN UR STRIP-MCNC: <2 MG/DL
WBC URINE: 12 /HPF

## 2023-09-14 PROCEDURE — 76770 US EXAM ABDO BACK WALL COMP: CPT

## 2023-09-14 PROCEDURE — 87086 URINE CULTURE/COLONY COUNT: CPT

## 2023-09-14 PROCEDURE — 99204 OFFICE O/P NEW MOD 45 MIN: CPT | Performed by: INTERNAL MEDICINE

## 2023-09-14 PROCEDURE — 81001 URINALYSIS AUTO W/SCOPE: CPT

## 2023-09-14 PROCEDURE — G0463 HOSPITAL OUTPT CLINIC VISIT: HCPCS | Performed by: INTERNAL MEDICINE

## 2023-09-14 ASSESSMENT — PAIN SCALES - GENERAL: PAINLEVEL: NO PAIN (0)

## 2023-09-14 NOTE — LETTER
"    9/14/2023         RE: Yandy Zamudio  [address] [address]        Dear Colleague,    Thank you for referring your patient, Yandy Zamudio, to the [facility] [place]. Please see a copy of my visit note below.    Oncology Rooming Note    September 14, 2023 11:38 AM   Yandy Zamudio is a 73 year old female who presents for:    Chief Complaint   Patient presents with     Hematology     New Consult,  Elevated Hemoglobin,   Referred by Omid Joe MD     Initial Vitals: Pulse 93   Ht 1.676 m (5' 5.98\")   LMP 07/27/2009   SpO2 95%   BMI 25.72 kg/m   Estimated body mass index is 25.72 kg/m  as calculated from the following:    Height as of this encounter: 1.676 m (5' 5.98\").    Weight as of 8/22/23: 72.3 kg (159 lb 4.8 oz). Body surface area is 1.83 meters squared.  Data Unavailable Comment: Data Unavailable   Patient's last menstrual period was 07/27/2009.  Allergies reviewed: Yes  Medications reviewed: Yes    Medications: Medication refills not needed today.  Pharmacy name entered into [employer]: [facility] [address] [place] [address], [place] - [address]    Clinical concerns: new consult      Christina Henley MA                      [facility] Hematology and Oncology Consult Note    Patient: Yandy Zamudio  MRN: [id number]  Date of Service: Sep 14, 2023       Reason for Visit    Chief Complaint   Patient presents with     Hematology     New Consult,  Elevated Hemoglobin,   Referred by Omid Joe MD         Assessment/Plan    ECOG Performance 0 - Independent    #.  Chronic erythrocytosis  #.  Dyspnea from unclear etiology     I reviewed her medical records from our system as well as from care everywhere.  She has mildly elevated RBC count since 2002 as far as I can go back.  Majority of her RBC counts are high in a few normal.  Recently, erythrocytosis has been more persistent in the last 3 years.  Hemoglobin is mildly elevated at 16.5 about a month ago.  Her WBC and " platelets were normal.  Reticulocyte count was normal.  She had work-up including erythropoietin and myeloproliferative neoplasm panel.  Erythropoietin level was normal at 7.  JAK2 with reflex to MPL and CALR mutations are all negative.  Based on that, her chronic erythrocytosis is mild related to primary bone marrow disease/myeloproliferative neoplasm.  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  I discussed that the etiology of secondary erythrocytosis (such as sleep apnea, chronic lung disease, renal tumor) should be continue to look into that with the help of pulmonary specialist and her primary care provider.  Very rarely, congenital causes of erythrocytosis can be further evaluated.  I do not believe that her mild degree of chronic erythrocytosis explains her shortness of breath.  I took the liberty of obtaining renal ultrasound and urinalysis to look for renal tumor.  I reviewed the results and it did not show any evidence of hematuria or renal tumor.     She has an appointment with pulmonary in a couple months.   From hematology standpoint, there is no indication for bone marrow biopsy at this point since current work-up does not indicate primary blood disease.  I will be available as needed for now.    Encounter Diagnoses:    Problem List Items Addressed This Visit       Hemoglobinopathy (H)    SOB (shortness of breath)     Other Visit Diagnoses       Erythrocytosis    -  Primary    Relevant Orders    CBC with platelets    US Renal Complete Non-Vascular (Completed)    Routine UA with micro reflex to culture (Completed)            ______________________________________________________________________________    Staging History   Cancer Staging   No matching staging information was found for the patient.      History    Ms. Yandy Zamudio is a very pleasant 73 year old female presented today accompanied by her son, Kelvin for further evaluation of elevated red cell counts.  She was found elevated  hemoglobin for evaluation of shortness of breath with leaning over or getting up.  She can walk without major difficulties.  She feels she has skipped beats.  She does not have drenching sweats, unintentional weight loss.  She was evaluated in the emergency room in 2023 and EKG showed sinus tachycardia without dysrhythmia.  Troponins were negative.  Other labs including thyroid functions were negative.  D-dimer was also negative.    She does not have any sleep disorder.  She has a sleep partner.  She does not snore or any report of apnea.  She does not smoke.  She does not have it chronic lung disease.    Review of systems.  Apart from describing in history, the remainder of comprehensive ROS was negative.    Past History    Past Medical History:   Diagnosis Date     Arthritis 2016    Osteoarthritis in hands and feet and knees     Blood glucose elevated 2014     Cataract      Colon polyps 2008    Tubulovillous adenoma. 4 mm polyp in the ascending col     Depressive disorder 1982     Diplopia      Fibromyalgia 10/12/2014     Hyperlipidemia LDL goal <130 10/31/2010     Major depression      Menopause     rare hot flash     Myalgias      Skin cancer      Unspecified essential hypertension      Past Surgical History:   Procedure Laterality Date     BIOPSY      Breast biopsy, negative     COLONOSCOPY      negative     ENT SURGERY      tonsillectomy     EYE SURGERY  [id number]    Cataract surgeries     GYN SURGERY  [id number]    2 C-sections and a tubal ligation     ORTHOPEDIC SURGERY      ex-ostosis, bone spur removal left femur     ZC NONSPECIFIC PROCEDURE       x2     ZZC NONSPECIFIC PROCEDURE  1961    Left femur bone growth     ZZC NONSPECIFIC PROCEDURE      S/P T&A     ZC NONSPECIFIC PROCEDURE  1980s    Breast Biopsy left     Family History   Problem Relation Age of Onset     Hypertension Father          of bladder cancer     C.A.D. Father      Cancer Father         " bladder  at age 88     Skin Cancer Father      Other Cancer Father         Bladder Cancer     Depression Father      Substance Abuse Father         Alcoholic     Respiratory Mother         COPD     Cerebrovascular Disease Mother      Depression Mother      Skin Cancer Brother      Cerebrovascular Disease Brother      Coronary Artery Disease Maternal Grandfather         Heart Attack     Coronary Artery Disease Maternal Grandmother         Extreme high blood preasure with enlarged heart     Hypertension Maternal Grandmother      Coronary Artery Disease Paternal Grandfather         Ruptured Aorta     Hypertension Paternal Grandfather      Thyroid Disease Paternal Grandmother         Thyroid removed     Hypertension Brother         Stroke     Cerebrovascular Disease Brother      Social History     Socioeconomic History     Marital status:      Spouse name: None     Number of children: None     Years of education: None     Highest education level: None   Tobacco Use     Smoking status: Never     Smokeless tobacco: Never   Substance and Sexual Activity     Alcohol use: Not Currently     Comment: once in a while, small amount     Drug use: No     Sexual activity: Not Currently     Partners: Male     Birth control/protection: Female Surgical     Comment: tubal ligation   Other Topics Concern     Parent/sibling w/ CABG, MI or angioplasty before 65F 55M? No       Allergies    Allergies   Allergen Reactions     Pravastatin      Myalgias       Trazodone      excess fatigue       Physical Exam    BP (!) 160/89 (BP Location: Left arm, Patient Position: Sitting, Cuff Size: Adult Regular)   Pulse 93   Ht 1.676 m (5' 5.98\")   Wt 71.4 kg (157 lb 4.8 oz)   LMP 2009   SpO2 95%   BMI 25.40 kg/m      General: alert, awake, not in acute distress  HEENT: Head: Normal, normocephalic, atraumatic.  Eye: Normal external eye, conjunctiva, lids cornea, SARIKA.  Nose: Normal external nose, mucus membranes and " septum.  Pharynx: Normal buccal mucosa. Normal pharynx.  Neck / Thyroid: Supple, no masses, nodes, nodules or enlargement.  Lymphatics: No abnormally enlarged lymph nodes.  Chest: Normal chest wall and respirations. Clear to auscultation.  Heart: S1 S2 RRR, no murmur.   Abdomen: abdomen is soft without significant tenderness, masses, organomegaly or guarding  Extremities: normal strength, tone, and muscle mass  Skin: normal. no rash or abnormalities  CNS: non focal.    Lab Results    No results found for this or any previous visit (from the past 168 hour(s)).    Imaging Results    US Renal Complete Non-Vascular    Result Date: 9/14/2023  EXAM: US RENAL COMPLETE NON-VASCULAR LOCATION: [facility] DATE: 9/14/2023 INDICATION: erythrocytosis, secondary COMPARISON: None. TECHNIQUE: Routine Bilateral Renal and Bladder Ultrasound. FINDINGS: RIGHT KIDNEY: 10.4 x 5.2 x 5.6 cm. Normal without hydronephrosis or masses. LEFT KIDNEY: 11.1 x 5.5 x 4.9 cm. Normal without hydronephrosis or masses. BLADDER: Normal.     IMPRESSION: 1.  Normal kidney ultrasound.    MA Screen Bilateral w/Gerard    Result Date: 8/30/2023  BILATERAL FULL FIELD DIGITAL SCREENING MAMMOGRAM WITH TOMOSYNTHESIS Performed on: 8/29/23 Compared to: 03/10/2022 and 03/17/2017 Technique:  This study was evaluated with the assistance of Computer-Aided Detection.  Breast Tomosynthesis was used in interpretation. Findings: The breasts have scattered areas of fibroglandular density.  There is no radiographic evidence of malignancy.     IMPRESSION: ACR BI-RADS Category 1: Negative RECOMMENDED FOLLOW-UP: Annual routine screening mammogram The results and recommendations of this examination will be communicated to the patient. Jeremy Babin MD      45 minutes spent on the date of the encounter doing chart review, history and exam, documentation, communication of the treatment plan with the care team and further activities as noted  above.    Signed by: Guy Lamb MD       Again, thank you for allowing me to participate in the care of your patient.        Sincerely,        Guy Lamb MD

## 2023-09-14 NOTE — PROGRESS NOTES
"Oncology Rooming Note    September 14, 2023 11:38 AM   Yandy Zamudio is a 73 year old female who presents for:    Chief Complaint   Patient presents with    Hematology     New Consult,  Elevated Hemoglobin,   Referred by Omid Joe MD     Initial Vitals: Pulse 93   Ht 1.676 m (5' 5.98\")   LMP 07/27/2009   SpO2 95%   BMI 25.72 kg/m   Estimated body mass index is 25.72 kg/m  as calculated from the following:    Height as of this encounter: 1.676 m (5' 5.98\").    Weight as of 8/22/23: 72.3 kg (159 lb 4.8 oz). Body surface area is 1.83 meters squared.  Data Unavailable Comment: Data Unavailable   Patient's last menstrual period was 07/27/2009.  Allergies reviewed: Yes  Medications reviewed: Yes    Medications: Medication refills not needed today.  Pharmacy name entered into [employer]: [id number] - [place], [place] - [address]    Clinical concerns: new consult      Christina Henley MA                    "

## 2023-09-14 NOTE — PATIENT INSTRUCTIONS
Continue current medications  Add Metoprolol XL 25mg tab, 1 tab daily for elevated heart rate  Screening colonoscopy  with  [facility]. Call  them at [phone] to schedule the procedure.   Schedule an eye exam appointment. Please ask the eye clinic to fax us a report of your eye exam to [phone], attention Dr Miguel Hough  booster  and influenza/flu vaccinations in the  Fall (mid October).  May get through a pharmacy or at clinic  Referral to Pulmonary re: shortness of breath. They will call to schedule. If you don t hear from a representative within 2 business  days, please call [phone].   Referral to Hematology re: elevated hemoglobin and shortness of breath.  They will call to schedule.  If you don t hear from a representative within 2 business days, please call [phone].  If hematology and pulmonary consultation without cause for shortness of breath cause, will then order future cardiac stress tst and cardiac ECHO. Will await consultation notes and their recommendations  Mammogram later this month as scheduled  Call  [phone] or use [employer] to schedule a future lab appointment  fasting in 1 year for cholesterol management.   For fasting labs, please refrain from eating for 8 hours or more.   Drink 2 glasses of water before your lab appointment. It is fine to take your  oral medications on the morning of the lab test as usual  Pt was informed regarding extra E&M billing for management of new or established medical issues not related to today's wellness/screening visit                    Patient Education   Personalized Prevention Plan  You are due for the preventive services outlined below.  Your care team is available to assist you in scheduling these services.  If you have already completed any of these items, please share that information with your care team to update in your medical record.  Health Maintenance Due   Topic Date Due    ANNUAL REVIEW OF HM ORDERS  Never done     Annual Wellness Visit  03/15/2023    Flu Vaccine (1) 09/01/2023     Your Health Risk Assessment indicates you feel you are not in good health    A healthy lifestyle helps keep the body fit and the mind alert. It helps protect you from disease, helps you fight disease, and helps prevent chronic disease (disease that doesn't go away) from getting worse. This is important as you get older and begin to notice twinges in muscles and joints and a decline in the strength and stamina you once took for granted. A healthy lifestyle includes good healthcare, good nutrition, weight control, recreation, and regular exercise. Avoid harmful substances and do what you can to keep safe. Another part of a healthy lifestyle is stay mentally active and socially involved.    Good healthcare   Have a wellness visit every year.   If you have new symptoms, let us know right away. Don't wait until the next checkup.   Take medicines exactly as prescribed and keep your medicines in a safe place. Tell us if your medicine causes problems.   Healthy diet and weight control   Eat 3 or 4 small, nutritious, low-fat, high-fiber meals a day. Include a variety of fruits, vegetables, and whole-grain foods.   Make sure you get enough calcium in your diet. Calcium, vitamin D, and exercise help prevent osteoporosis (bone thinning).   If you live alone, try eating with others when you can. That way you get a good meal and have company while you eat it.   Try to keep a healthy weight. If you eat more calories than your body uses for energy, it will be stored as fat and you will gain weight.     Recreation   Recreation is not limited to sports and team events. It includes any activity that provides relaxation, interest, enjoyment, and exercise. Recreation provides an outlet for physical, mental, and social energy. It can give a sense of worth and achievement. It can help you stay healthy.    Mental Exercise and Social Involvement  Mental and emotional health  is as important as physical health. Keep in touch with friends and family. Stay as active as possible. Continue to learn and challenge yourself.   Things you can do to stay mentally active are:  Learn something new, like a foreign language or musical instrument.   Play SCRABBLE or do crossword puzzles. If you cannot find people to play these games with you at home, you can play them with others on your computer through the Internet.   Join a games club--anything from card games to chess or checkers or lawn bowling.   Start a new hobby.   Go back to school.   Volunteer.   Read.   Keep up with world events.

## 2023-09-14 NOTE — PROGRESS NOTES
[facility] Hematology and Oncology Consult Note    Patient: Yandy Zamudio  MRN: [id number]  Date of Service: Sep 14, 2023       Reason for Visit    Chief Complaint   Patient presents with    Hematology     New Consult,  Elevated Hemoglobin,   Referred by Omid Joe MD         Assessment/Plan    ECOG Performance 0 - Independent    #.  Chronic erythrocytosis  #.  Dyspnea from unclear etiology     I reviewed her medical records from our system as well as from care everywhere.  She has mildly elevated RBC count since 2002 as far as I can go back.  Majority of her RBC counts are high in a few normal.  Recently, erythrocytosis has been more persistent in the last 3 years.  Hemoglobin is mildly elevated at 16.5 about a month ago.  Her WBC and platelets were normal.  Reticulocyte count was normal.  She had work-up including erythropoietin and myeloproliferative neoplasm panel.  Erythropoietin level was normal at 7.  JAK2 with reflex to MPL and CALR mutations are all negative.  Based on that, her chronic erythrocytosis is mild related to primary bone marrow disease/myeloproliferative neoplasm.  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  I discussed that the etiology of secondary erythrocytosis (such as sleep apnea, chronic lung disease, renal tumor) should be continue to look into that with the help of pulmonary specialist and her primary care provider.  Very rarely, congenital causes of erythrocytosis can be further evaluated.  I do not believe that her mild degree of chronic erythrocytosis explains her shortness of breath.  I took the liberty of obtaining renal ultrasound and urinalysis to look for renal tumor.  I reviewed the results and it did not show any evidence of hematuria or renal tumor.     She has an appointment with pulmonary in a couple months.   From hematology standpoint, there is no indication for bone marrow biopsy at this point since current work-up does not indicate primary blood  disease.  I will be available as needed for now.    Encounter Diagnoses:    Problem List Items Addressed This Visit       Hemoglobinopathy (H)    SOB (shortness of breath)     Other Visit Diagnoses       Erythrocytosis    -  Primary    Relevant Orders    CBC with platelets    US Renal Complete Non-Vascular (Completed)    Routine UA with micro reflex to culture (Completed)            ______________________________________________________________________________    Staging History   Cancer Staging   No matching staging information was found for the patient.      History    Ms. Yandy Zamudio is a very pleasant 73 year old female presented today accompanied by her son, Kelvin for further evaluation of elevated red cell counts.  She was found elevated hemoglobin for evaluation of shortness of breath with leaning over or getting up.  She can walk without major difficulties.  She feels she has skipped beats.  She does not have drenching sweats, unintentional weight loss.  She was evaluated in the emergency room in July 2023 and EKG showed sinus tachycardia without dysrhythmia.  Troponins were negative.  Other labs including thyroid functions were negative.  D-dimer was also negative.    She does not have any sleep disorder.  She has a sleep partner.  She does not snore or any report of apnea.  She does not smoke.  She does not have it chronic lung disease.    Review of systems.  Apart from describing in history, the remainder of comprehensive ROS was negative.    Past History    Past Medical History:   Diagnosis Date    Arthritis 2016    Osteoarthritis in hands and feet and knees    Blood glucose elevated 04/27/2014    Cataract     Colon polyps 01/2008    Tubulovillous adenoma. 4 mm polyp in the ascending col    Depressive disorder 1982    Diplopia     Fibromyalgia 10/12/2014    Hyperlipidemia LDL goal <130 10/31/2010    Major depression     Menopause 2008    rare hot flash    Myalgias     Skin cancer     Unspecified  essential hypertension      Past Surgical History:   Procedure Laterality Date    BIOPSY      Breast biopsy, negative    COLONOSCOPY  2015    negative    ENT SURGERY      tonsillectomy    EYE SURGERY  [id number]    Cataract surgeries    GYN SURGERY  [id number]    2 C-sections and a tubal ligation    ORTHOPEDIC SURGERY      ex-ostosis, bone spur removal left femur    ZZC NONSPECIFIC PROCEDURE       x2    ZZC NONSPECIFIC PROCEDURE  1961    Left femur bone growth    ZZC NONSPECIFIC PROCEDURE      S/P T&A    ZZC NONSPECIFIC PROCEDURE      Breast Biopsy left     Family History   Problem Relation Age of Onset    Hypertension Father          of bladder cancer    C.A.D. Father     Cancer Father         bladder  at age 88    Skin Cancer Father     Other Cancer Father         Bladder Cancer    Depression Father     Substance Abuse Father         Alcoholic    Respiratory Mother         COPD    Cerebrovascular Disease Mother     Depression Mother     Skin Cancer Brother     Cerebrovascular Disease Brother     Coronary Artery Disease Maternal Grandfather         Heart Attack    Coronary Artery Disease Maternal Grandmother         Extreme high blood preasure with enlarged heart    Hypertension Maternal Grandmother     Coronary Artery Disease Paternal Grandfather         Ruptured Aorta    Hypertension Paternal Grandfather     Thyroid Disease Paternal Grandmother         Thyroid removed    Hypertension Brother         Stroke    Cerebrovascular Disease Brother      Social History     Socioeconomic History    Marital status:      Spouse name: None    Number of children: None    Years of education: None    Highest education level: None   Tobacco Use    Smoking status: Never    Smokeless tobacco: Never   Substance and Sexual Activity    Alcohol use: Not Currently     Comment: once in a while, small amount    Drug use: No    Sexual activity: Not Currently     Partners: Male     Birth  "control/protection: Female Surgical     Comment: tubal ligation   Other Topics Concern    Parent/sibling w/ CABG, MI or angioplasty before 65F 55M? No       Allergies    Allergies   Allergen Reactions    Pravastatin      Myalgias      Trazodone      excess fatigue       Physical Exam    BP (!) 160/89 (BP Location: Left arm, Patient Position: Sitting, Cuff Size: Adult Regular)   Pulse 93   Ht 1.676 m (5' 5.98\")   Wt 71.4 kg (157 lb 4.8 oz)   LMP 07/27/2009   SpO2 95%   BMI 25.40 kg/m      General: alert, awake, not in acute distress  HEENT: Head: Normal, normocephalic, atraumatic.  Eye: Normal external eye, conjunctiva, lids cornea, SARIKA.  Nose: Normal external nose, mucus membranes and septum.  Pharynx: Normal buccal mucosa. Normal pharynx.  Neck / Thyroid: Supple, no masses, nodes, nodules or enlargement.  Lymphatics: No abnormally enlarged lymph nodes.  Chest: Normal chest wall and respirations. Clear to auscultation.  Heart: S1 S2 RRR, no murmur.   Abdomen: abdomen is soft without significant tenderness, masses, organomegaly or guarding  Extremities: normal strength, tone, and muscle mass  Skin: normal. no rash or abnormalities  CNS: non focal.    Lab Results    No results found for this or any previous visit (from the past 168 hour(s)).    Imaging Results    US Renal Complete Non-Vascular    Result Date: 9/14/2023  EXAM: US RENAL COMPLETE NON-VASCULAR LOCATION: [facility] DATE: 9/14/2023 INDICATION: erythrocytosis, secondary COMPARISON: None. TECHNIQUE: Routine Bilateral Renal and Bladder Ultrasound. FINDINGS: RIGHT KIDNEY: 10.4 x 5.2 x 5.6 cm. Normal without hydronephrosis or masses. LEFT KIDNEY: 11.1 x 5.5 x 4.9 cm. Normal without hydronephrosis or masses. BLADDER: Normal.     IMPRESSION: 1.  Normal kidney ultrasound.    MA Screen Bilateral w/Gerard    Result Date: 8/30/2023  BILATERAL FULL FIELD DIGITAL SCREENING MAMMOGRAM WITH TOMOSYNTHESIS Performed on: 8/29/23 Compared to: " 03/10/2022 and 03/17/2017 Technique:  This study was evaluated with the assistance of Computer-Aided Detection.  Breast Tomosynthesis was used in interpretation. Findings: The breasts have scattered areas of fibroglandular density.  There is no radiographic evidence of malignancy.     IMPRESSION: ACR BI-RADS Category 1: Negative RECOMMENDED FOLLOW-UP: Annual routine screening mammogram The results and recommendations of this examination will be communicated to the patient. Jeremy Babin MD      45 minutes spent on the date of the encounter doing chart review, history and exam, documentation, communication of the treatment plan with the care team and further activities as noted above.    Signed by: Guy Lamb MD

## 2023-09-15 ENCOUNTER — PATIENT OUTREACH (OUTPATIENT)
Dept: ONCOLOGY | Facility: CLINIC | Age: 73
End: 2023-09-15
Payer: COMMERCIAL

## 2023-09-15 NOTE — PROGRESS NOTES
[facility]: Cancer Care Short Note                                    Discussion with Patient:                                                      Dr. Lamb viewed U/S results from 9/14/23. Normal kidney U/S.  Urine culture pending-will notify her if abnormal results.   Recheck labs in 3 weeks, as discussed at her appointment yesterday.     Assessment:                                                      U/S Renal Complete on 9/14/23:    IMPRESSION:  1.  Normal kidney ultrasound.    Intervention/Education provided during outreach:                                                       Called patient. Left message to return my call.    Patient returned my call. Results and plan given. Told patient will call her back with finalized urine culture results.  Patient verbalized understanding    Patient to follow up as scheduled at next appt:  labs 10/5/23.    9/18/23:  Dr. Lamb sent message to patient with urine and U/S results.     Signature:  Tamica Biswas RN

## 2023-09-16 LAB — BACTERIA UR CULT: NORMAL

## 2023-10-05 ENCOUNTER — OFFICE VISIT (OUTPATIENT)
Dept: PULMONOLOGY | Facility: CLINIC | Age: 73
End: 2023-10-05
Payer: COMMERCIAL

## 2023-10-05 ENCOUNTER — PATIENT OUTREACH (OUTPATIENT)
Dept: ONCOLOGY | Facility: CLINIC | Age: 73
End: 2023-10-05

## 2023-10-05 ENCOUNTER — LAB (OUTPATIENT)
Dept: LAB | Facility: CLINIC | Age: 73
End: 2023-10-05
Payer: COMMERCIAL

## 2023-10-05 VITALS
HEIGHT: 66 IN | DIASTOLIC BLOOD PRESSURE: 85 MMHG | OXYGEN SATURATION: 100 % | WEIGHT: 157.4 LBS | HEART RATE: 94 BPM | SYSTOLIC BLOOD PRESSURE: 119 MMHG | BODY MASS INDEX: 25.3 KG/M2

## 2023-10-05 DIAGNOSIS — J98.6 DIAPHRAGMATIC PARESIS: ICD-10-CM

## 2023-10-05 DIAGNOSIS — D75.1 ERYTHROCYTOSIS: ICD-10-CM

## 2023-10-05 DIAGNOSIS — R06.02 SOB (SHORTNESS OF BREATH): Primary | ICD-10-CM

## 2023-10-05 DIAGNOSIS — D58.2 HEMOGLOBINOPATHY (H): ICD-10-CM

## 2023-10-05 DIAGNOSIS — R06.02 SOB (SHORTNESS OF BREATH): ICD-10-CM

## 2023-10-05 LAB
DLCOCOR-%PRED-PRE: 107 %
DLCOCOR-PRE: 21.3 ML/MIN/MMHG
DLCOUNC-%PRED-PRE: 115 %
DLCOUNC-PRE: 22.98 ML/MIN/MMHG
DLCOUNC-PRED: 19.88 ML/MIN/MMHG
ERV-%PRED-PRE: 79 %
ERV-PRE: 0.84 L
ERV-PRED: 1.05 L
ERYTHROCYTE [DISTWIDTH] IN BLOOD BY AUTOMATED COUNT: 12.4 % (ref 10–15)
EXPTIME-PRE: 4.42 SEC
FEF2575-%PRED-POST: 154 %
FEF2575-%PRED-PRE: 143 %
FEF2575-POST: 2.73 L/SEC
FEF2575-PRE: 2.54 L/SEC
FEF2575-PRED: 1.77 L/SEC
FEFMAX-%PRED-PRE: 108 %
FEFMAX-PRE: 6.32 L/SEC
FEFMAX-PRED: 5.8 L/SEC
FEV1-%PRED-PRE: 111 %
FEV1-PRE: 2.39 L
FEV1FEV6-PRE: 84 %
FEV1FEV6-PRED: 79 %
FEV1FVC-PRE: 84 %
FEV1FVC-PRED: 78 %
FEV1SVC-PRE: 86 %
FEV1SVC-PRED: 65 %
FIFMAX-PRE: 4.52 L/SEC
FRCPLETH-%PRED-PRE: 89 %
FRCPLETH-PRE: 2.56 L
FRCPLETH-PRED: 2.84 L
FVC-%PRED-PRE: 103 %
FVC-PRE: 2.85 L
FVC-PRED: 2.75 L
HCT VFR BLD AUTO: 46.9 % (ref 35–47)
HGB BLD-MCNC: 15.5 G/DL (ref 11.7–15.7)
HGB BLD-MCNC: 16.3 G/DL
IC-%PRED-PRE: 92 %
IC-PRE: 1.95 L
IC-PRED: 2.1 L
MCH RBC QN AUTO: 28.7 PG (ref 26.5–33)
MCHC RBC AUTO-ENTMCNC: 33 G/DL (ref 31.5–36.5)
MCV RBC AUTO: 87 FL (ref 78–100)
PLATELET # BLD AUTO: 231 10E3/UL (ref 150–450)
RBC # BLD AUTO: 5.4 10E6/UL (ref 3.8–5.2)
RVPLETH-%PRED-PRE: 77 %
RVPLETH-PRE: 1.71 L
RVPLETH-PRED: 2.21 L
TLCPLETH-%PRED-PRE: 84 %
TLCPLETH-PRE: 4.51 L
TLCPLETH-PRED: 5.32 L
VA-%PRED-PRE: 85 %
VA-PRE: 4.19 L
VC-%PRED-PRE: 84 %
VC-PRE: 2.8 L
VC-PRED: 3.31 L
WBC # BLD AUTO: 9.7 10E3/UL (ref 4–11)

## 2023-10-05 PROCEDURE — 94729 DIFFUSING CAPACITY: CPT | Performed by: INTERNAL MEDICINE

## 2023-10-05 PROCEDURE — 94726 PLETHYSMOGRAPHY LUNG VOLUMES: CPT | Performed by: INTERNAL MEDICINE

## 2023-10-05 PROCEDURE — 94060 EVALUATION OF WHEEZING: CPT | Performed by: INTERNAL MEDICINE

## 2023-10-05 PROCEDURE — 36415 COLL VENOUS BLD VENIPUNCTURE: CPT

## 2023-10-05 PROCEDURE — 85018 HEMOGLOBIN: CPT | Mod: QW | Performed by: INTERNAL MEDICINE

## 2023-10-05 PROCEDURE — 85027 COMPLETE CBC AUTOMATED: CPT

## 2023-10-05 PROCEDURE — 99204 OFFICE O/P NEW MOD 45 MIN: CPT | Performed by: NURSE PRACTITIONER

## 2023-10-05 NOTE — PATIENT INSTRUCTIONS
- we will get a sniff test to see if your right diaphragm moves  - I will have you see cardiology for your shortness of breath. This seems cardiac related.   - after you see cardiology you can make a follow up appointment with me.     If you have worsening symptoms, questions, or need to speak with the nurse please call [phone].

## 2023-10-05 NOTE — PROGRESS NOTES
Pulmonary Outpatient Consult Note  October 5, 2023    Assessment and Plan:   Yandy Zamudio is a 73 year old female with history of chronic erythrocytosis, tachycardia, HTN, HLD, osteopenia presenting today for evaluation of shortness of breath.   SOB with minimal activity and when bending over. Unremarkable work up so far in ED and with oncology. Denies any other respiratory symptoms or history.    PFTs show normal lung function. Recent CXR does show a elevated right diaphragm.   SpO2 and lung exam normal today.     #. Dyspnea -- Concern for cardiac cause with positional symptoms and normal lung function. With family history or CAD requiring stents I will refer her for further work up. I will rule out hemidiaphragm paralysis based on recent CXR but I have low suspicious for this as she does not have a history of chest trauma or surgery and there is no restriction noted on PFTs.   Sniff test, I will contact her with results.   Cardiology referral   #. HCM - UTD with COVID vaccine (boosted yesterday), pneumococcal vaccine, TDAP  Recommend seasonal flu vaccine and RSV  Encouraged continued activity as able.      RTC after seeing cardiology    Ashley Darden, MICHELLE  Pulmonary Medicine  ______________________________________________________________________________    CC:   Chief Complaint   Patient presents with    Consult     R06.02 (ICD-10-CM) - SOB (shortness of breath)  D58.2 (ICD-10-CM) - Hemoglobinopathy (H24)         HPI:   Pat presents today for evaluation of shortness of breath. She has noticed this with activity but also consistently with leaning over or getting up.  She can walk without major difficulties if she paces herself. Denies cough, wheeze, chest tightness, or hemoptysis. No LE edema.   Also reports occasional left lower chest discomfort that is not re-producible and does not seem to have a pattern.   She was evaluated in the emergency room in July 2023 and EKG showed sinus tachycardia without  dysrhythmia.  Troponins were negative.  Other labs including thyroid functions were negative.  D-dimer was also negative.     ? Dehydration so she is increasing fluids and having some blood work rechecked with Dr. Lamb.     FH- mother had COPD, multiple members with CAD and stents     PMH:  Patient Active Problem List    Diagnosis Date Noted    Osteopenia, unspecified location 2023     Priority: Medium    SOB (shortness of breath) 2023     Priority: Medium    Primary osteoarthritis, unspecified site 2022     Priority: Medium    Hyperlipidemia LDL goal <100 2022     Priority: Medium    Hemoglobinopathy (H24) 2020     Priority: Medium    Fibromyalgia 10/12/2014     Priority: Medium    Advanced directives, counseling/discussion 2012     Priority: Medium     Advance Directive Problem List Overview:   Name Relationship Phone    Primary Health Care Agent            Alternative Health Care Agent          Discussed advance care planning with patient; information given to patient to review. 2012         Colon polyps      Priority: Medium     Tubulovillous adenoma      Essential hypertension 2006     Priority: Medium     Problem list name updated by automated process. Provider to review         PSH:  Past Surgical History:   Procedure Laterality Date    BIOPSY      Breast biopsy, negative    COLONOSCOPY      negative    ENT SURGERY      tonsillectomy    EYE SURGERY  [id number]    Cataract surgeries    GYN SURGERY  [id number]    2 C-sections and a tubal ligation    ORTHOPEDIC SURGERY      ex-ostosis, bone spur removal left femur    ZZC NONSPECIFIC PROCEDURE       x2    ZZC NONSPECIFIC PROCEDURE  1961    Left femur bone growth    ZZC NONSPECIFIC PROCEDURE      S/P T&A    ZZC NONSPECIFIC PROCEDURE  1980s    Breast Biopsy left       Current Meds:  Current Outpatient Medications   Medication Sig Dispense Refill    atorvastatin (LIPITOR) 20 MG tablet Take 1  "tablet (20 mg) by mouth daily 90 tablet 3    lisinopril (ZESTRIL) 40 MG tablet Take 1 tablet (40 mg) by mouth daily 90 tablet 3    meloxicam (MOBIC) 15 MG tablet TAKE 1 TABLET BY MOUTH AT BEDTIME 90 tablet 0    metoprolol succinate ER (TOPROL XL) 25 MG 24 hr tablet Take 1 tablet (25 mg) by mouth daily 30 tablet 11    multivitamin, therapeutic with minerals (MULTI-VITAMIN) TABS tablet Take 1 tablet by mouth daily           Allergies:  Allergies   Allergen Reactions    Pravastatin      Myalgias      Trazodone      excess fatigue       Social Hx:  Marital status: lives with    Number of children: 2, no grandchildren   Resides in a house built in 1912, no concern for mold.  Occupational history: artist and teacher    service: none  Pets: none, no birds or rodents  Smoking history: never smoker, no vaping   Alcohol use: minimal   Recreational drug use: none  Hobbies: painting   Recent Travel: [place]     Family HX: family history includes C.A.D. in her father; Cancer in her father; Cerebrovascular Disease in her brother, brother, and mother; Coronary Artery Disease in her maternal grandfather, maternal grandmother, and paternal grandfather; Depression in her father and mother; Hypertension in her brother, father, maternal grandmother, and paternal grandfather; Other Cancer in her father; Respiratory in her mother; Skin Cancer in her brother and father; Substance Abuse in her father; Thyroid Disease in her paternal grandmother.    ROS:   ROS: 10-point review performed and notable for the above mentioned symptoms. The remainder reviewed and negative.     Physical Exam:  /85 (BP Location: Left arm, Patient Position: Sitting, Cuff Size: Adult Large)   Pulse 94   Ht 1.683 m (5' 6.25\")   Wt 71.4 kg (157 lb 6.4 oz)   LMP 07/27/2009   SpO2 100%   BMI 25.21 kg/m      Physical Exam  Constitutional:       General: She is not in acute distress.     Appearance: She is not ill-appearing or diaphoretic. "   Cardiovascular:      Rate and Rhythm: Normal rate and regular rhythm.      Pulses: Normal pulses.      Heart sounds: Normal heart sounds.   Pulmonary:      Effort: Pulmonary effort is normal. No respiratory distress.      Breath sounds: No wheezing or rhonchi.   Musculoskeletal:      Right lower leg: No edema.      Left lower leg: No edema.   Skin:     General: Skin is warm and dry.      Findings: No rash.   Neurological:      Mental Status: She is alert.   Psychiatric:         Behavior: Behavior normal.         PFT's     10/5/2023      Impression:  Full Pulmonary Function Test is normal.  PFTs are consistent with  no  obstructive disease.  Spirometry is not consistent with reversibility.  There is no hyperinflation.  There is no air-trapping.  Diffusion capacity when corrected for hemoglobin is not reduced.    Labs:   personally reviewed in the EMR.   Latest Reference Range & Units 08/15/23 13:18   WBC 4.0 - 11.0 10e3/uL 9.8   Hemoglobin 11.7 - 15.7 g/dL 16.5 (H)   Hematocrit 35.0 - 47.0 % 49.5 (H)   Platelet Count 150 - 450 10e3/uL 246   RBC Count 3.80 - 5.20 10e6/uL 5.75 (H)   MCV 78 - 100 fL 86   MCH 26.5 - 33.0 pg 28.7   MCHC 31.5 - 36.5 g/dL 33.3   RDW 10.0 - 15.0 % 12.5   % Neutrophils % 69   % Lymphocytes % 23   % Monocytes % 6   % Eosinophils % 2   % Basophils % 1   Absolute Basophils 0.0 - 0.2 10e3/uL 0.1   Absolute Eosinophils 0.0 - 0.7 10e3/uL 0.2   Absolute Immature Granulocytes <=0.4 10e3/uL 0.0   Absolute Lymphocytes 0.8 - 5.3 10e3/uL 2.3   Absolute Monocytes 0.0 - 1.3 10e3/uL 0.6   % Immature Granulocytes % 0   Absolute Neutrophils 1.6 - 8.3 10e3/uL 6.8   % Retic 0.5 - 2.0 % 1.2   Absolute Retic 0.025 - 0.095 10e6/uL 0.068     Imaging studies: personally reviewed and interpreted. Below are the Radiology interpretations.    DX CHEST AP OR PA AND LATERAL 2 VIEWS, 7/19/2023   Moderate left glenohumeral osteoarthrosis. No focal pulmonary consolidation. No pleural effusion. No pneumothorax. Normal  cardiomediastinal silhouette.

## 2023-10-05 NOTE — PROGRESS NOTES
"[facility]: Cancer Care Short Note                                    Discussion with Patient:                                                      10/5/23 Called Pat, per the request of Dr. Lamb, to follow up on her CBC/platelet lab that was drawn on 10/5/23    LM that  writer was calling to follow up on her results, Requested a return phone call, contact information was provided.  Will continue to try to reach patient.    10/6/23, 0940 Second attempt made to call patient to review labs and plan. Contact information was provided and return phone call requested.  10/6/23, 0945 Spoke with Pat and relayed  that Dr. Lamb reviewed  lab results and relayed that they do not suggest that she has a blood disease, She does not need any further hematology workup at this time. She should follow up as needed with Dr. Lamb and follow up with her other providers as scheduled. She stated understanding and also had received a note from Dr. Lamb with results. She wanted to extend appreciation to Dr. Lamb about what a good Doctor she is and stated, \"she is a gem\". Pat shared that she has a cardiology apt on Monday as well as a sniff test. She shared that she had a pulm apt and was told that her diaphragm was elevated on the right side which could be contributing to her SOB. She will follow up with her other providers as scheduled       Assessment:                                                      Refer to 10/5 labs in Epic (CBC/Platelet)        Intervention/Education provided during outreach:                                                       Patient stated understanding of the plan and extended appreciation      Signature:  Pauline Ortiz RN  "

## 2023-10-09 ENCOUNTER — OFFICE VISIT (OUTPATIENT)
Dept: CARDIOLOGY | Facility: CLINIC | Age: 73
End: 2023-10-09
Payer: COMMERCIAL

## 2023-10-09 ENCOUNTER — HOSPITAL ENCOUNTER (OUTPATIENT)
Dept: GENERAL RADIOLOGY | Facility: CLINIC | Age: 73
Discharge: HOME | End: 2023-10-09
Attending: NURSE PRACTITIONER | Admitting: NURSE PRACTITIONER
Payer: COMMERCIAL

## 2023-10-09 VITALS
HEIGHT: 66 IN | HEART RATE: 93 BPM | BODY MASS INDEX: 25.07 KG/M2 | OXYGEN SATURATION: 95 % | WEIGHT: 156 LBS | SYSTOLIC BLOOD PRESSURE: 132 MMHG | DIASTOLIC BLOOD PRESSURE: 74 MMHG

## 2023-10-09 DIAGNOSIS — R06.02 SOB (SHORTNESS OF BREATH): ICD-10-CM

## 2023-10-09 PROCEDURE — 93000 ELECTROCARDIOGRAM COMPLETE: CPT | Performed by: INTERNAL MEDICINE

## 2023-10-09 PROCEDURE — 76000 FLUOROSCOPY <1 HR PHYS/QHP: CPT

## 2023-10-09 PROCEDURE — 99204 OFFICE O/P NEW MOD 45 MIN: CPT | Performed by: INTERNAL MEDICINE

## 2023-10-09 NOTE — LETTER
10/9/2023    Omid Rivera MD  [address] [facility] [address]    RE: Yandy Zamudio       Dear Colleague,     I had the pleasure of seeing Yandy Zamudio in the [facility].  HPI and Plan:   Very delightful 73-year-old lady here for evaluation of shortness of breath.    Patient has a history of hypertension dyslipidemia and anxiety depression.    She was seen in 2020 by my colleague Dr. Bridger Preciado for shortness of breath on exertion.  She had a stress echocardiogram during which she achieved 7 METS.  She had hypertensive response to exercise but test was negative for ischemia or infarction.  Rhythm monitoring did show some short runs of NSVT and PSVT lasting approximately 12 beats symptoms mainly correlated with ectopics.    She is doing well until early summer of this year.  On June 12 she was working outside picking up wood when she became very short of breath.  She also noticed a fast heart rate.  This settled with rest but later on that night she felt dizzy fell to the ground and sustaining a left eyebrow laceration.  Since then she has not been very physically active but on July 18 she had been short of breath when she was picking up wood.  She has no PND orthopnea weight gain or ankle swelling.  She denies exertional chest discomfort.    She has been referred to the few specialist for evaluation of her symptoms.  She has seen a hematologist as she has a mildly elevated hemoglobin.    She has been to see a pulmonologist as well and was told that her PFTs are fine.  However she was breathless when she bent down and she rarely has an elevated right hemidiaphragm and she is on to go for the testing for this condition.    Her cardiac examination is unremarkable.    EKG today shows sinus rhythm with nonspecific ST and T wave changes.    I will have her undergo exercise stress echocardiography to evaluate her shortness of breath.    As mentioned above some short runs of paroxysmal  arrhythmias were noted in her rhythm monitoring.  Due to the infrequency of her symptoms I doubt whether 7 to 14-day event monitoring would show anything but I did advise consideration of an AorTx or AmVac mobile device.    We will notify her of the test results and arrange follow-up as necessary.    Orders Placed This Encounter   Procedures    EKG 12-lead complete w/read - Clinics (performed today)       No orders of the defined types were placed in this encounter.      Encounter Diagnosis   Name Primary?    SOB (shortness of breath)        CURRENT MEDICATIONS:  Current Outpatient Medications   Medication Sig Dispense Refill    atorvastatin (LIPITOR) 20 MG tablet Take 1 tablet (20 mg) by mouth daily 90 tablet 3    lisinopril (ZESTRIL) 40 MG tablet Take 1 tablet (40 mg) by mouth daily 90 tablet 3    meloxicam (MOBIC) 15 MG tablet TAKE 1 TABLET BY MOUTH AT BEDTIME 90 tablet 0    metoprolol succinate ER (TOPROL XL) 25 MG 24 hr tablet Take 1 tablet (25 mg) by mouth daily 30 tablet 11    multivitamin, therapeutic with minerals (MULTI-VITAMIN) TABS tablet Take 1 tablet by mouth daily         ALLERGIES     Allergies   Allergen Reactions    Pravastatin      Myalgias      Trazodone      excess fatigue       PAST MEDICAL HISTORY:  Past Medical History:   Diagnosis Date    Arthritis 2016    Osteoarthritis in hands and feet and knees    Blood glucose elevated 04/27/2014    Cataract     Colon polyps 01/2008    Tubulovillous adenoma. 4 mm polyp in the ascending col    Depressive disorder 1982    Diplopia     Fibromyalgia 10/12/2014    Hyperlipidemia LDL goal <130 10/31/2010    Major depression     Menopause 2008    rare hot flash    Myalgias     Skin cancer     Unspecified essential hypertension        PAST SURGICAL HISTORY:  Past Surgical History:   Procedure Laterality Date    BIOPSY  1986    Breast biopsy, negative    COLONOSCOPY  2015    negative    ENT SURGERY  1957    tonsillectomy    EYE SURGERY  [id number]    Cataract  surgeries    GYN SURGERY  [id number]    2 C-sections and a tubal ligation    ORTHOPEDIC SURGERY      ex-ostosis, bone spur removal left femur    ZZC NONSPECIFIC PROCEDURE       x2    ZZC NONSPECIFIC PROCEDURE  1961    Left femur bone growth    ZZC NONSPECIFIC PROCEDURE      S/P T&A    ZZC NONSPECIFIC PROCEDURE      Breast Biopsy left       FAMILY HISTORY:  Family History   Problem Relation Age of Onset    Hypertension Father          of bladder cancer    C.A.D. Father     Cancer Father         bladder  at age 88    Skin Cancer Father     Other Cancer Father         Bladder Cancer    Depression Father     Substance Abuse Father         Alcoholic    Respiratory Mother         COPD    Cerebrovascular Disease Mother     Depression Mother     Skin Cancer Brother     Cerebrovascular Disease Brother     Coronary Artery Disease Maternal Grandfather         Heart Attack    Coronary Artery Disease Maternal Grandmother         Extreme high blood preasure with enlarged heart    Hypertension Maternal Grandmother     Coronary Artery Disease Paternal Grandfather         Ruptured Aorta    Hypertension Paternal Grandfather     Thyroid Disease Paternal Grandmother         Thyroid removed    Hypertension Brother         Stroke    Cerebrovascular Disease Brother        SOCIAL HISTORY:  Social History     Socioeconomic History    Marital status:      Spouse name: None    Number of children: None    Years of education: None    Highest education level: None   Tobacco Use    Smoking status: Never     Passive exposure: Past    Smokeless tobacco: Never   Vaping Use    Vaping Use: Never used   Substance and Sexual Activity    Alcohol use: Not Currently     Comment: once in a while, small amount    Drug use: No    Sexual activity: Not Currently     Partners: Male     Birth control/protection: Female Surgical     Comment: tubal ligation   Other Topics Concern    Parent/sibling w/ CABG, MI or angioplasty  "before 65F 55M? No       Review of Systems:  Skin:        Eyes:       ENT:       Respiratory:  Positive for dyspnea on exertion  Cardiovascular:    Positive for;palpitations;chest pain  Gastroenterology:      Genitourinary:       Musculoskeletal:       Neurologic:       Psychiatric:       Heme/Lymph/Imm:       Endocrine:         Physical Exam:  Vitals: /74 (BP Location: Right arm, Patient Position: Sitting, Cuff Size: Adult Large)   Pulse 93   Ht 1.676 m (5' 6\")   Wt 70.8 kg (156 lb)   LMP 07/27/2009   SpO2 95%   BMI 25.18 kg/m      Constitutional:  cooperative, alert and oriented, well developed, well nourished, in no acute distress        Skin:  warm and dry to the touch, no apparent skin lesions or masses noted          Head:  normocephalic, no masses or lesions        Eyes:  pupils equal and round, conjunctivae and lids unremarkable, sclera white, no xanthalasma, EOMS intact, no nystagmus        Lymph:No Cervical lymphadenopathy present     ENT:  no pallor or cyanosis, dentition good        Neck:  carotid pulses are full and equal bilaterally, JVP normal, no carotid bruit        Respiratory:  normal breath sounds, clear to auscultation, normal A-P diameter, normal symmetry, normal respiratory excursion, no use of accessory muscles         Cardiac: regular rhythm, normal S1/S2, no S3 or S4, apical impulse not displaced, no murmurs, gallops or rubs                pulses full and equal, no bruits auscultated                                        GI:  abdomen soft, non-tender, BS normoactive, no mass, no HSM, no bruits        Extremities and Muscular Skeletal:  no deformities, clubbing, cyanosis, erythema observed              Neurological:  no gross motor deficits        Psych:  Alert and Oriented x 3        Recent Lab Results:  LIPID RESULTS:  Lab Results   Component Value Date    CHOL 180 02/13/2023    CHOL 178 12/09/2020    HDL 64 02/13/2023    HDL 53 12/09/2020    LDL 86 02/13/2023    LDL 85 " 12/09/2020    TRIG 150 (H) 02/13/2023    TRIG 198 (H) 12/09/2020    CHOLHDLRATIO 4.7 04/15/2015       LIVER ENZYME RESULTS:  Lab Results   Component Value Date    AST 32 02/13/2023    AST 25 12/09/2020    ALT 37 (H) 02/13/2023    ALT 33 12/09/2020       CBC RESULTS:  Lab Results   Component Value Date    WBC 9.7 10/05/2023    WBC 8.3 06/23/2020    RBC 5.40 (H) 10/05/2023    RBC 5.33 (H) 06/23/2020    HGB 15.5 10/05/2023    HGB 15.3 06/23/2020    HCT 46.9 10/05/2023    HCT 47.4 (H) 06/23/2020    MCV 87 10/05/2023    MCV 89 06/23/2020    MCH 28.7 10/05/2023    MCH 28.7 06/23/2020    MCHC 33.0 10/05/2023    MCHC 32.3 06/23/2020    RDW 12.4 10/05/2023    RDW 12.9 06/23/2020     10/05/2023     06/23/2020       BMP RESULTS:  Lab Results   Component Value Date     08/15/2023     12/09/2020    POTASSIUM 4.7 08/15/2023    POTASSIUM 4.2 03/10/2022    POTASSIUM 3.8 12/09/2020    CHLORIDE 104 08/15/2023    CHLORIDE 104 03/10/2022    CHLORIDE 107 12/09/2020    CO2 26 08/15/2023    CO2 27 03/10/2022    CO2 27 12/09/2020    ANIONGAP 10 08/15/2023    ANIONGAP 7 03/10/2022    ANIONGAP 4 12/09/2020     (H) 08/15/2023     (H) 03/10/2022    GLC 99 12/09/2020    BUN 11.4 08/15/2023    BUN 17 03/10/2022    BUN 18 12/09/2020    CR 0.71 08/15/2023    CR 0.64 12/09/2020    GFRESTIMATED 90 08/15/2023    GFRESTIMATED >90 12/09/2020    GFRESTBLACK >90 12/09/2020    DELVIN 9.8 08/15/2023    DELVIN 9.3 12/09/2020        A1C RESULTS:  Lab Results   Component Value Date    A1C 5.6 03/15/2022    A1C 5.4 11/09/2017       INR RESULTS:  No results found for: INR        CC  NI Sewell CNP  [address] [facility] [address]  [place] [address]    Thank you for allowing me to participate in the care of your patient.      Sincerely,     DR CAROLA MOHAN MD     [facility]

## 2023-10-09 NOTE — PROGRESS NOTES
HPI and Plan:   Very delightful 73-year-old lady here for evaluation of shortness of breath.    Patient has a history of hypertension dyslipidemia and anxiety depression.    She was seen in 2020 by my colleague Dr. Bridger Preciado for shortness of breath on exertion.  She had a stress echocardiogram during which she achieved 7 METS.  She had hypertensive response to exercise but test was negative for ischemia or infarction.  Rhythm monitoring did show some short runs of NSVT and PSVT lasting approximately 12 beats symptoms mainly correlated with ectopics.    She is doing well until early summer of this year.  On June 12 she was working outside picking up wood when she became very short of breath.  She also noticed a fast heart rate.  This settled with rest but later on that night she felt dizzy fell to the ground and sustaining a left eyebrow laceration.  Since then she has not been very physically active but on July 18 she had been short of breath when she was picking up wood.  She has no PND orthopnea weight gain or ankle swelling.  She denies exertional chest discomfort.    She has been referred to the few specialist for evaluation of her symptoms.  She has seen a hematologist as she has a mildly elevated hemoglobin.    She has been to see a pulmonologist as well and was told that her PFTs are fine.  However she was breathless when she bent down and she rarely has an elevated right hemidiaphragm and she is on to go for the testing for this condition.    Her cardiac examination is unremarkable.    EKG today shows sinus rhythm with nonspecific ST and T wave changes.    I will have her undergo exercise stress echocardiography to evaluate her shortness of breath.    As mentioned above some short runs of paroxysmal arrhythmias were noted in her rhythm monitoring.  Due to the infrequency of her symptoms I doubt whether 7 to 14-day event monitoring would show anything but I did advise consideration of an i[employer] or  [employer] mobile device.    We will notify her of the test results and arrange follow-up as necessary.    Orders Placed This Encounter   Procedures    EKG 12-lead complete w/read - Clinics (performed today)       No orders of the defined types were placed in this encounter.      Encounter Diagnosis   Name Primary?    SOB (shortness of breath)        CURRENT MEDICATIONS:  Current Outpatient Medications   Medication Sig Dispense Refill    atorvastatin (LIPITOR) 20 MG tablet Take 1 tablet (20 mg) by mouth daily 90 tablet 3    lisinopril (ZESTRIL) 40 MG tablet Take 1 tablet (40 mg) by mouth daily 90 tablet 3    meloxicam (MOBIC) 15 MG tablet TAKE 1 TABLET BY MOUTH AT BEDTIME 90 tablet 0    metoprolol succinate ER (TOPROL XL) 25 MG 24 hr tablet Take 1 tablet (25 mg) by mouth daily 30 tablet 11    multivitamin, therapeutic with minerals (MULTI-VITAMIN) TABS tablet Take 1 tablet by mouth daily         ALLERGIES     Allergies   Allergen Reactions    Pravastatin      Myalgias      Trazodone      excess fatigue       PAST MEDICAL HISTORY:  Past Medical History:   Diagnosis Date    Arthritis 2016    Osteoarthritis in hands and feet and knees    Blood glucose elevated 2014    Cataract     Colon polyps 2008    Tubulovillous adenoma. 4 mm polyp in the ascending col    Depressive disorder 1982    Diplopia     Fibromyalgia 10/12/2014    Hyperlipidemia LDL goal <130 10/31/2010    Major depression     Menopause     rare hot flash    Myalgias     Skin cancer     Unspecified essential hypertension        PAST SURGICAL HISTORY:  Past Surgical History:   Procedure Laterality Date    BIOPSY      Breast biopsy, negative    COLONOSCOPY      negative    ENT SURGERY      tonsillectomy    EYE SURGERY  [id number]    Cataract surgeries    GYN SURGERY  [id number]    2 C-sections and a tubal ligation    ORTHOPEDIC SURGERY      ex-ostosis, bone spur removal left femur    ZZC NONSPECIFIC PROCEDURE       x2    ZZC  NONSPECIFIC PROCEDURE  1961    Left femur bone growth    ZZ NONSPECIFIC PROCEDURE      S/P T&A    ZZC NONSPECIFIC PROCEDURE  1980s    Breast Biopsy left       FAMILY HISTORY:  Family History   Problem Relation Age of Onset    Hypertension Father          of bladder cancer    C.A.D. Father     Cancer Father         bladder  at age 88    Skin Cancer Father     Other Cancer Father         Bladder Cancer    Depression Father     Substance Abuse Father         Alcoholic    Respiratory Mother         COPD    Cerebrovascular Disease Mother     Depression Mother     Skin Cancer Brother     Cerebrovascular Disease Brother     Coronary Artery Disease Maternal Grandfather         Heart Attack    Coronary Artery Disease Maternal Grandmother         Extreme high blood preasure with enlarged heart    Hypertension Maternal Grandmother     Coronary Artery Disease Paternal Grandfather         Ruptured Aorta    Hypertension Paternal Grandfather     Thyroid Disease Paternal Grandmother         Thyroid removed    Hypertension Brother         Stroke    Cerebrovascular Disease Brother        SOCIAL HISTORY:  Social History     Socioeconomic History    Marital status:      Spouse name: None    Number of children: None    Years of education: None    Highest education level: None   Tobacco Use    Smoking status: Never     Passive exposure: Past    Smokeless tobacco: Never   Vaping Use    Vaping Use: Never used   Substance and Sexual Activity    Alcohol use: Not Currently     Comment: once in a while, small amount    Drug use: No    Sexual activity: Not Currently     Partners: Male     Birth control/protection: Female Surgical     Comment: tubal ligation   Other Topics Concern    Parent/sibling w/ CABG, MI or angioplasty before 65F 55M? No       Review of Systems:  Skin:        Eyes:       ENT:       Respiratory:  Positive for dyspnea on exertion  Cardiovascular:    Positive for;palpitations;chest  "pain  Gastroenterology:      Genitourinary:       Musculoskeletal:       Neurologic:       Psychiatric:       Heme/Lymph/Imm:       Endocrine:         Physical Exam:  Vitals: /74 (BP Location: Right arm, Patient Position: Sitting, Cuff Size: Adult Large)   Pulse 93   Ht 1.676 m (5' 6\")   Wt 70.8 kg (156 lb)   LMP 07/27/2009   SpO2 95%   BMI 25.18 kg/m      Constitutional:  cooperative, alert and oriented, well developed, well nourished, in no acute distress        Skin:  warm and dry to the touch, no apparent skin lesions or masses noted          Head:  normocephalic, no masses or lesions        Eyes:  pupils equal and round, conjunctivae and lids unremarkable, sclera white, no xanthalasma, EOMS intact, no nystagmus        Lymph:No Cervical lymphadenopathy present     ENT:  no pallor or cyanosis, dentition good        Neck:  carotid pulses are full and equal bilaterally, JVP normal, no carotid bruit        Respiratory:  normal breath sounds, clear to auscultation, normal A-P diameter, normal symmetry, normal respiratory excursion, no use of accessory muscles         Cardiac: regular rhythm, normal S1/S2, no S3 or S4, apical impulse not displaced, no murmurs, gallops or rubs                pulses full and equal, no bruits auscultated                                        GI:  abdomen soft, non-tender, BS normoactive, no mass, no HSM, no bruits        Extremities and Muscular Skeletal:  no deformities, clubbing, cyanosis, erythema observed              Neurological:  no gross motor deficits        Psych:  Alert and Oriented x 3        Recent Lab Results:  LIPID RESULTS:  Lab Results   Component Value Date    CHOL 180 02/13/2023    CHOL 178 12/09/2020    HDL 64 02/13/2023    HDL 53 12/09/2020    LDL 86 02/13/2023    LDL 85 12/09/2020    TRIG 150 (H) 02/13/2023    TRIG 198 (H) 12/09/2020    CHOLHDLRATIO 4.7 04/15/2015       LIVER ENZYME RESULTS:  Lab Results   Component Value Date    AST 32 02/13/2023    " AST 25 12/09/2020    ALT 37 (H) 02/13/2023    ALT 33 12/09/2020       CBC RESULTS:  Lab Results   Component Value Date    WBC 9.7 10/05/2023    WBC 8.3 06/23/2020    RBC 5.40 (H) 10/05/2023    RBC 5.33 (H) 06/23/2020    HGB 15.5 10/05/2023    HGB 15.3 06/23/2020    HCT 46.9 10/05/2023    HCT 47.4 (H) 06/23/2020    MCV 87 10/05/2023    MCV 89 06/23/2020    MCH 28.7 10/05/2023    MCH 28.7 06/23/2020    MCHC 33.0 10/05/2023    MCHC 32.3 06/23/2020    RDW 12.4 10/05/2023    RDW 12.9 06/23/2020     10/05/2023     06/23/2020       BMP RESULTS:  Lab Results   Component Value Date     08/15/2023     12/09/2020    POTASSIUM 4.7 08/15/2023    POTASSIUM 4.2 03/10/2022    POTASSIUM 3.8 12/09/2020    CHLORIDE 104 08/15/2023    CHLORIDE 104 03/10/2022    CHLORIDE 107 12/09/2020    CO2 26 08/15/2023    CO2 27 03/10/2022    CO2 27 12/09/2020    ANIONGAP 10 08/15/2023    ANIONGAP 7 03/10/2022    ANIONGAP 4 12/09/2020     (H) 08/15/2023     (H) 03/10/2022    GLC 99 12/09/2020    BUN 11.4 08/15/2023    BUN 17 03/10/2022    BUN 18 12/09/2020    CR 0.71 08/15/2023    CR 0.64 12/09/2020    GFRESTIMATED 90 08/15/2023    GFRESTIMATED >90 12/09/2020    GFRESTBLACK >90 12/09/2020    DELVIN 9.8 08/15/2023    DELVIN 9.3 12/09/2020        A1C RESULTS:  Lab Results   Component Value Date    A1C 5.6 03/15/2022    A1C 5.4 11/09/2017       INR RESULTS:  No results found for: INR        CC  Ashley Darden, APRN CNP  [address] [facility] [address]  [place] [address]

## 2023-10-10 DIAGNOSIS — I10 ESSENTIAL HYPERTENSION: ICD-10-CM

## 2023-10-11 RX ORDER — LISINOPRIL 40 MG/1
40 TABLET ORAL DAILY
Qty: 90 TABLET | Refills: 1 | Status: SHIPPED | OUTPATIENT
Start: 2023-10-11 | End: 2024-04-08

## 2023-10-16 DIAGNOSIS — M19.90 OSTEOARTHRITIS, UNSPECIFIED OSTEOARTHRITIS TYPE, UNSPECIFIED SITE: ICD-10-CM

## 2023-10-17 ENCOUNTER — PATIENT OUTREACH (OUTPATIENT)
Dept: ONCOLOGY | Facility: CLINIC | Age: 73
End: 2023-10-17
Payer: COMMERCIAL

## 2023-10-17 DIAGNOSIS — J98.6 DIAPHRAGMATIC PARESIS: ICD-10-CM

## 2023-10-17 DIAGNOSIS — R06.02 SOB (SHORTNESS OF BREATH): Primary | ICD-10-CM

## 2023-10-17 NOTE — PROGRESS NOTES
New Patient Oncology Nurse Navigator Note     Referring provider: Ashley Darden CNP    Referring Clinic/Organization: [facility]  Referred to: Thoracic Surgery  Requested provider (if applicable): First available - did not specify   Referral Received: 10/10/23       Evaluation for :   Diagnosis   J98.6 (ICD-10-CM) - Diaphragmatic paresis     Clinical History (per Nurse review of records provided):    10/05/2023 PFTs done  10/09/2023 XR Chest/Heart Fluoro (bookmarked) showed:   FINDINGS: The right hemidiaphragm is mildly elevated. There is  decreased downward excursion of the right hemidiaphragm with quiet  breathing and paradoxical upward motion of the right hemidiaphragm  with sharp inspiration (sniff). Findings are consistent with right  diaphragmatic paresis.     Stable calcified granuloma in the left lung base.                                                               IMPRESSION: Right diaphragmatic paresis.    Clinical Assessment / Barriers to Care (Per Nurse):    Never smoker  Records Location: [facility]   Records Needed: None  Additional testing needed prior to consult: sitting/supine PFTs    MASSIEL Patton, RN, OCN  [facility] Oncology Nurse Navigator  [phone] / [phone]

## 2023-10-18 RX ORDER — MELOXICAM 15 MG/1
TABLET ORAL
Qty: 90 TABLET | Refills: 1 | Status: SHIPPED | OUTPATIENT
Start: 2023-10-18 | End: 2024-04-09

## 2023-10-19 ENCOUNTER — TELEPHONE (OUTPATIENT)
Dept: CARDIOLOGY | Facility: CLINIC | Age: 73
End: 2023-10-19

## 2023-10-19 ENCOUNTER — HOSPITAL ENCOUNTER (OUTPATIENT)
Dept: CARDIOLOGY | Facility: CLINIC | Age: 73
Discharge: HOME | End: 2023-10-19
Attending: INTERNAL MEDICINE | Admitting: INTERNAL MEDICINE
Payer: COMMERCIAL

## 2023-10-19 DIAGNOSIS — R06.02 SOB (SHORTNESS OF BREATH): ICD-10-CM

## 2023-10-19 PROCEDURE — 93321 DOPPLER ECHO F-UP/LMTD STD: CPT | Mod: TC

## 2023-10-19 PROCEDURE — 93325 DOPPLER ECHO COLOR FLOW MAPG: CPT | Mod: 26 | Performed by: INTERNAL MEDICINE

## 2023-10-19 PROCEDURE — 255N000002 HC RX 255 OP 636: Performed by: INTERNAL MEDICINE

## 2023-10-19 PROCEDURE — 93325 DOPPLER ECHO COLOR FLOW MAPG: CPT | Mod: TC

## 2023-10-19 PROCEDURE — 93018 CV STRESS TEST I&R ONLY: CPT | Performed by: INTERNAL MEDICINE

## 2023-10-19 PROCEDURE — 93321 DOPPLER ECHO F-UP/LMTD STD: CPT | Mod: 26 | Performed by: INTERNAL MEDICINE

## 2023-10-19 PROCEDURE — 93016 CV STRESS TEST SUPVJ ONLY: CPT | Performed by: INTERNAL MEDICINE

## 2023-10-19 PROCEDURE — 93350 STRESS TTE ONLY: CPT | Mod: 26 | Performed by: INTERNAL MEDICINE

## 2023-10-19 RX ADMIN — HUMAN ALBUMIN MICROSPHERES AND PERFLUTREN 4 ML: 10; .22 INJECTION, SOLUTION INTRAVENOUS at 13:32

## 2023-10-19 NOTE — TELEPHONE ENCOUNTER
"Team received notification from Dr. Brian Kumar    \"Brian Kumar MD  [facility] Heart Team 3  Pls let her know that steco results are normal. No further cardiac work up or follow up needed at this time.  To call if home rhythm monitoring records anything suspicious.  Thanks.\"    Writer called to Yandy but had to [facility].  Informed her of Dr. Kumar's message and information.  I left our nurse line phone number for her to call back with questions\concerns.   Writer stated that if Yandy did not have any questions or concerns, she did not need to return the call unless she wanted to let us know she got our message and understood.    Maile Romero RN on 10/19/2023 at 4:05 PM    "

## 2024-04-08 DIAGNOSIS — I10 ESSENTIAL HYPERTENSION: ICD-10-CM

## 2024-04-08 DIAGNOSIS — M19.90 OSTEOARTHRITIS, UNSPECIFIED OSTEOARTHRITIS TYPE, UNSPECIFIED SITE: ICD-10-CM

## 2024-04-08 DIAGNOSIS — R00.0 TACHYCARDIA: ICD-10-CM

## 2024-04-08 DIAGNOSIS — E78.5 HYPERLIPIDEMIA LDL GOAL <100: ICD-10-CM

## 2024-04-08 RX ORDER — LISINOPRIL 40 MG/1
40 TABLET ORAL DAILY
Qty: 90 TABLET | Refills: 0 | Status: SHIPPED | OUTPATIENT
Start: 2024-04-08 | End: 2024-04-09

## 2024-04-09 ENCOUNTER — MYC MEDICAL ADVICE (OUTPATIENT)
Dept: INTERNAL MEDICINE | Facility: CLINIC | Age: 74
End: 2024-04-09

## 2024-04-09 RX ORDER — ATORVASTATIN CALCIUM 20 MG/1
20 TABLET, FILM COATED ORAL DAILY
Qty: 90 TABLET | Refills: 3 | Status: SHIPPED | OUTPATIENT
Start: 2024-04-09

## 2024-04-09 RX ORDER — METOPROLOL SUCCINATE 25 MG/1
25 TABLET, EXTENDED RELEASE ORAL DAILY
Qty: 90 TABLET | Refills: 1 | Status: SHIPPED | OUTPATIENT
Start: 2024-04-09 | End: 2024-08-28

## 2024-04-09 RX ORDER — MELOXICAM 15 MG/1
TABLET ORAL
Qty: 90 TABLET | Refills: 1 | Status: SHIPPED | OUTPATIENT
Start: 2024-04-09 | End: 2024-08-28

## 2024-04-09 RX ORDER — LISINOPRIL 40 MG/1
40 TABLET ORAL DAILY
Qty: 90 TABLET | Refills: 1 | Status: SHIPPED | OUTPATIENT
Start: 2024-04-09 | End: 2024-08-28

## 2024-06-06 ENCOUNTER — MYC MEDICAL ADVICE (OUTPATIENT)
Dept: INTERNAL MEDICINE | Facility: CLINIC | Age: 74
End: 2024-06-06

## 2024-08-21 ENCOUNTER — LAB (OUTPATIENT)
Dept: LAB | Facility: CLINIC | Age: 74
End: 2024-08-21
Payer: COMMERCIAL

## 2024-08-21 DIAGNOSIS — I10 ESSENTIAL HYPERTENSION: ICD-10-CM

## 2024-08-21 DIAGNOSIS — E78.5 HYPERLIPIDEMIA LDL GOAL <100: ICD-10-CM

## 2024-08-21 LAB
ALBUMIN SERPL BCG-MCNC: 4.4 G/DL (ref 3.5–5.2)
ALP SERPL-CCNC: 105 U/L (ref 40–150)
ALT SERPL W P-5'-P-CCNC: 31 U/L (ref 0–50)
ANION GAP SERPL CALCULATED.3IONS-SCNC: 10 MMOL/L (ref 7–15)
AST SERPL W P-5'-P-CCNC: 30 U/L (ref 0–45)
BILIRUB SERPL-MCNC: 0.6 MG/DL
BUN SERPL-MCNC: 11.2 MG/DL (ref 8–23)
CALCIUM SERPL-MCNC: 9.1 MG/DL (ref 8.8–10.4)
CHLORIDE SERPL-SCNC: 103 MMOL/L (ref 98–107)
CHOLEST SERPL-MCNC: 152 MG/DL
CREAT SERPL-MCNC: 0.7 MG/DL (ref 0.51–0.95)
EGFRCR SERPLBLD CKD-EPI 2021: >90 ML/MIN/1.73M2
FASTING STATUS PATIENT QL REPORTED: YES
FASTING STATUS PATIENT QL REPORTED: YES
GLUCOSE SERPL-MCNC: 97 MG/DL (ref 70–99)
HCO3 SERPL-SCNC: 26 MMOL/L (ref 22–29)
HDLC SERPL-MCNC: 58 MG/DL
LDLC SERPL CALC-MCNC: 76 MG/DL
NONHDLC SERPL-MCNC: 94 MG/DL
POTASSIUM SERPL-SCNC: 4.4 MMOL/L (ref 3.4–5.3)
PROT SERPL-MCNC: 6.7 G/DL (ref 6.4–8.3)
SODIUM SERPL-SCNC: 139 MMOL/L (ref 135–145)
TRIGL SERPL-MCNC: 90 MG/DL

## 2024-08-21 PROCEDURE — 80061 LIPID PANEL: CPT

## 2024-08-21 PROCEDURE — 80053 COMPREHEN METABOLIC PANEL: CPT

## 2024-08-21 PROCEDURE — 36415 COLL VENOUS BLD VENIPUNCTURE: CPT

## 2024-08-26 SDOH — HEALTH STABILITY: PHYSICAL HEALTH: ON AVERAGE, HOW MANY MINUTES DO YOU ENGAGE IN EXERCISE AT THIS LEVEL?: 60 MIN

## 2024-08-26 SDOH — HEALTH STABILITY: PHYSICAL HEALTH: ON AVERAGE, HOW MANY DAYS PER WEEK DO YOU ENGAGE IN MODERATE TO STRENUOUS EXERCISE (LIKE A BRISK WALK)?: 1 DAY

## 2024-08-26 ASSESSMENT — SOCIAL DETERMINANTS OF HEALTH (SDOH): HOW OFTEN DO YOU GET TOGETHER WITH FRIENDS OR RELATIVES?: NEVER

## 2024-08-28 ENCOUNTER — OFFICE VISIT (OUTPATIENT)
Dept: INTERNAL MEDICINE | Facility: CLINIC | Age: 74
End: 2024-08-28
Payer: COMMERCIAL

## 2024-08-28 VITALS
DIASTOLIC BLOOD PRESSURE: 72 MMHG | HEART RATE: 90 BPM | OXYGEN SATURATION: 99 % | BODY MASS INDEX: 24.4 KG/M2 | WEIGHT: 151.8 LBS | HEIGHT: 66 IN | TEMPERATURE: 97.7 F | SYSTOLIC BLOOD PRESSURE: 124 MMHG

## 2024-08-28 DIAGNOSIS — I10 ESSENTIAL HYPERTENSION: ICD-10-CM

## 2024-08-28 DIAGNOSIS — Z12.31 ENCOUNTER FOR SCREENING MAMMOGRAM FOR BREAST CANCER: ICD-10-CM

## 2024-08-28 DIAGNOSIS — Z12.11 SCREEN FOR COLON CANCER: ICD-10-CM

## 2024-08-28 DIAGNOSIS — D58.2 HEMOGLOBINOPATHY (H): ICD-10-CM

## 2024-08-28 DIAGNOSIS — M85.80 OSTEOPENIA, UNSPECIFIED LOCATION: ICD-10-CM

## 2024-08-28 DIAGNOSIS — R00.0 TACHYCARDIA: ICD-10-CM

## 2024-08-28 DIAGNOSIS — M19.90 OSTEOARTHRITIS, UNSPECIFIED OSTEOARTHRITIS TYPE, UNSPECIFIED SITE: ICD-10-CM

## 2024-08-28 DIAGNOSIS — E78.5 HYPERLIPIDEMIA LDL GOAL <100: ICD-10-CM

## 2024-08-28 DIAGNOSIS — Z00.00 MEDICARE ANNUAL WELLNESS VISIT, SUBSEQUENT: ICD-10-CM

## 2024-08-28 PROBLEM — R06.02 SOB (SHORTNESS OF BREATH): Status: RESOLVED | Noted: 2023-09-13 | Resolved: 2024-08-28

## 2024-08-28 LAB
ERYTHROCYTE [DISTWIDTH] IN BLOOD BY AUTOMATED COUNT: 12.5 % (ref 10–15)
HCT VFR BLD AUTO: 51.8 % (ref 35–47)
HGB BLD-MCNC: 16.7 G/DL (ref 11.7–15.7)
MCH RBC QN AUTO: 28.4 PG (ref 26.5–33)
MCHC RBC AUTO-ENTMCNC: 32.2 G/DL (ref 31.5–36.5)
MCV RBC AUTO: 88 FL (ref 78–100)
PLATELET # BLD AUTO: 280 10E3/UL (ref 150–450)
RBC # BLD AUTO: 5.88 10E6/UL (ref 3.8–5.2)
WBC # BLD AUTO: 10.4 10E3/UL (ref 4–11)

## 2024-08-28 PROCEDURE — 36415 COLL VENOUS BLD VENIPUNCTURE: CPT | Performed by: INTERNAL MEDICINE

## 2024-08-28 PROCEDURE — G0439 PPPS, SUBSEQ VISIT: HCPCS | Performed by: INTERNAL MEDICINE

## 2024-08-28 PROCEDURE — 85027 COMPLETE CBC AUTOMATED: CPT | Performed by: INTERNAL MEDICINE

## 2024-08-28 PROCEDURE — 99214 OFFICE O/P EST MOD 30 MIN: CPT | Mod: 25 | Performed by: INTERNAL MEDICINE

## 2024-08-28 RX ORDER — LISINOPRIL 40 MG/1
40 TABLET ORAL DAILY
Qty: 90 TABLET | Refills: 1 | Status: SHIPPED | OUTPATIENT
Start: 2024-08-28

## 2024-08-28 RX ORDER — MELOXICAM 15 MG/1
15 TABLET ORAL AT BEDTIME
Qty: 90 TABLET | Refills: 1 | Status: SHIPPED | OUTPATIENT
Start: 2024-08-28

## 2024-08-28 RX ORDER — METOPROLOL SUCCINATE 25 MG/1
25 TABLET, EXTENDED RELEASE ORAL DAILY
Qty: 90 TABLET | Refills: 1 | Status: SHIPPED | OUTPATIENT
Start: 2024-08-28

## 2024-08-28 NOTE — PATIENT INSTRUCTIONS
" Continue current medications  Prescriptions refilled at your pharmacy.    Labs today as ordered re:  hemoglobin  Call  [phone] or use [employer] PTY to schedule a future lab appointment  nonfasting in 3 months re: hemoglobin and fasting in1 year.   For fasting labs, please refrain from eating for 8 hours or more.   Drink 2 glasses of water before your lab appointment. It is fine to take your  oral medications on the morning of the lab test as usual  Schedule a follow up appointment with me in clinic a few days after these future labs in 1 year are drawn to review results and other medical issues as necessary  Because non-acute appointments to see me in clinic are currently booking out about 3 months at the clinic (for multiple reasons), please use [employer] PTY or call the appointment line now to schedule the future appointment so that it is \"on the books\".   Continue diet and exercise  Mammogram   8/20/24 or later  This will be done at any [facility] with mammography Clinic. Call [phone] or use [employer] PTY to schedule.   Screening colonoscopy at [facility] will call you to coordinate your procedure appointment. If you don't hear from a representative within 2 business days, please call [phone].  Hold meloxicam   for 7 days prior to the colonoscopy  to prevent bleeding risk  Repeat DEXA bone density test in 1 year  I would recommend an updated covid vaccination and an influenza/flu vaccination in the Fall (October) 2024 at the clinic or any pharmacy  Pt was informed regarding extra E&M billing for management of new or established medical issues not related to today's wellness/screening visit  "

## 2024-08-28 NOTE — PROGRESS NOTES
Preventive Care Visit  [facility]  Omid Rivera MD, Internal Medicine  Aug 28, 2024      ASSESSMENT:   1. Medicare annual wellness visit, subsequent  See plan below regarding healthcare maintenance recommendations.  Otherwise up-to-date for age. Has some mild urinary incontinence but patient declines medication therapy or surgical intervention.  Prefers using a pad. Breathing stable with known mild right  hemidiphargm paralysis with normal PFTs. Pt declines intervention for this. Mild SOB with bending a lot but able to walk well without SOB long distance when upright    2. Essential hypertension  Controlled.  Continue current medication.  Repeat lab 1 year  - lisinopril (ZESTRIL) 40 MG tablet; Take 1 tablet (40 mg) by mouth daily.  Dispense: 90 tablet; Refill: 1  - Comprehensive metabolic panel; Future    3. Osteoarthritis, unspecified osteoarthritis type, unspecified site  Controlled.  Continue current medication.  Renal function normal  - meloxicam (MOBIC) 15 MG tablet; Take 1 tablet (15 mg) by mouth at bedtime.  Dispense: 90 tablet; Refill: 1    4. Tachycardia  Controlled.  Denies recent palpitations.  Continue current medication  - metoprolol succinate ER (TOPROL XL) 25 MG 24 hr tablet; Take 1 tablet (25 mg) by mouth daily.  Dispense: 90 tablet; Refill: 1    5. Hyperlipidemia LDL goal <100  Controlled.  Continue atorvastatin.  Repeat lab with your  - Comprehensive metabolic panel; Future  - Lipid panel reflex to direct LDL Fasting; Future    6. Hemoglobinopathy (H24)  Hemoglobin level rechecked today and again elevated but stable. WBC and platelets normal.  See previous hematology note in chart who thought this is not a primary blood disease issue and thought to be a secondary problem.  Etiology unclear however.  Renal ultrasound negative for mass.  Oxygen levels normal.  Prior PFTs normal.  Energy okay during the day and no obvious sleep apnea.   Maintaining good hydration intake. Will  "recheck again in 3 months  - CBC with platelets; Future  - CBC with platelets; Future  - CBC with platelets  - CBC with platelets; Future    7. Osteopenia, unspecified location  Due for repeat DEXA in 1 year.  Previous fracture wrist not requiring prescription therapy    8. Encounter for screening mammogram for breast cancer  Candidate for screening.  Asymptomatic  - MA Screen Bilateral w/Gerard; Future    9. Screen for colon cancer  Candidate for screening.  Asymptomatic  - Colonoscopy Screening  Referral; Future        PLAN:  Continue current medications  Prescriptions refilled at your pharmacy.    Labs today as ordered re:  hemoglobin  Call  [phone] or use [facility] to schedule a future lab appointment  nonfasting in 3 months re: hemoglobin and fasting in1 year.   For fasting labs, please refrain from eating for 8 hours or more.   Drink 2 glasses of water before your lab appointment. It is fine to take your  oral medications on the morning of the lab test as usual  Schedule a follow up appointment with me in clinic a few days after these future labs in 1 year are drawn to review results and other medical issues as necessary  Because non-acute appointments to see me in clinic are currently booking out about 3 months at the clinic (for multiple reasons), please use [facility] or call the appointment line now to schedule the future appointment so that it is \"on the books\".   Continue diet and exercise  Mammogram   8/20/24 or later  This will be done at any [facility]  clinic with mammography Clinic. Call [phone] or use [facility] to schedule.   Screening colonoscopy at [facility] will call you to coordinate your procedure appointment. If you don't hear from a representative within 2 business days, please call [phone].  Hold meloxicam   for 7 days prior to the colonoscopy  to prevent bleeding risk  Repeat DEXA bone density test in 1 year  I would recommend an updated covid " vaccination and an influenza/flu vaccination in the Fall (October) 2024 at the clinic or any pharmacy  Pt was informed regarding extra E&M billing for management of new or established medical issues not related to today's wellness/screening visit             Subjective   Beatrice is a 74 year old, presenting for the following:  Wellness Visit  And follow-up medical issues as above         Health Care Directive  Patient does  have a Health Care Directive or Living Will: Patient states has Advance Directive and will bring in a copy to clinic.    HPI         8/26/2024   General Health   How would you rate your overall physical health? Good   Feel stress (tense, anxious, or unable to sleep) Rather much      (!) STRESS CONCERN      8/26/2024   Nutrition   Diet: Regular (no restrictions)            8/26/2024   Exercise   Days per week of moderate/strenous exercise 1 day   Average minutes spent exercising at this level 60 min      (!) EXERCISE CONCERN      8/26/2024   Social Factors   Frequency of gathering with friends or relatives Never   Worry food won't last until get money to buy more No   Food not last or not have enough money for food? No   Do you have housing? (Housing is defined as stable permanent housing and does not include staying ouside in a car, in a tent, in an abandoned building, in an overnight shelter, or couch-surfing.) Yes   Are you worried about losing your housing? No   Lack of transportation? No   Unable to get utilities (heat,electricity)? No      (!) SOCIAL CONNECTIONS CONCERN      8/26/2024   Fall Risk   Fallen 2 or more times in the past year? No    No   Trouble with walking or balance? No    No       Multiple values from one day are sorted in reverse-chronological order          8/26/2024   Activities of Daily Living- Home Safety   Needs help with the following daily activites None of the above   Safety concerns in the home None of the above            8/26/2024   Dental   Dentist two times every  year? (!) NO            8/26/2024   Hearing Screening   Hearing concerns? None of the above            8/26/2024   Driving Risk Screening   Patient/family members have concerns about driving No            8/26/2024   General Alertness/Fatigue Screening   Have you been more tired than usual lately? No            8/26/2024   Urinary Incontinence Screening   Bothered by leaking urine in past 6 months Yes            8/26/2024   TB Screening   Were you born outside of the [place]? No            Today's PHQ-2 Score:       8/27/2024     1:57 PM   PHQ-2 ( 1999 [employer])   Q1: Little interest or pleasure in doing things 0   Q2: Feeling down, depressed or hopeless 1   PHQ-2 Score 1   Q1: Little interest or pleasure in doing things Not at all   Q2: Feeling down, depressed or hopeless Several days   PHQ-2 Score 1           8/26/2024   Substance Use   Alcohol more than 3/day or more than 7/wk No   Do you have a current opioid prescription? No   How severe/bad is pain from 1 to 10? 1/10   Do you use any other substances recreationally? No        Social History     Tobacco Use    Smoking status: Never     Passive exposure: Past    Smokeless tobacco: Never   Vaping Use    Vaping status: Never Used   Substance Use Topics    Alcohol use: Not Currently     Comment: once in a while, small amount    Drug use: No          8/29/2023   LAST FHS-7 RESULTS   1st degree relative breast or ovarian cancer No   Any relative bilateral breast cancer No   Any male have breast cancer No   Any ONE woman have BOTH breast AND ovarian cancer No   Any woman with breast cancer before 50yrs No   2 or more relatives with breast AND/OR ovarian cancer No   2 or more relatives with breast AND/OR bowel cancer No         Mammogram Screening - Mammogram every 1-2 years updated in Health Maintenance based on mutual decision making    ASCVD Risk   The 10-year ASCVD risk score (Nelida JORGE, et al., 2019) is: 20.8%    Values used to calculate the score:      Age: 74  years      Sex: Female      Is Non- : No      Diabetic: No      Tobacco smoker: No      Systolic Blood Pressure: 146 mmHg      Is BP treated: Yes      HDL Cholesterol: 58 mg/dL      Total Cholesterol: 152 mg/dL       DEXA 2022. Re[eat 3 years      Pt's past medical history, family history, habits, medications and allergies were reviewed with the patient today.   Most recent lab results reviewed with pt. Problem list and histories reviewed & adjusted, as indicated.     Current providers sharing in care for this patient include:  Patient Care Team:  Omid Rivera MD as PCP - General  Omid Rivera MD as Assigned PCP  Mihai Viveros LMFT as Other (see comments) (Therapist)  Guy Lamb MD as MD (Hematology)  Ashley Darden APRN CNP as Pulmonologist (Pulmonary Disease)  Guy Lamb MD as Assigned Cancer Care Provider  Brian Kumar MD as Assigned Heart and Vascular Provider  Ashley Darden APRN CNP as Assigned Pulmonology Provider  Bridger Arellano MD as MD (Dermatology)    The following health maintenance items are reviewed in Epic and correct as of today:  Health Maintenance   Topic Date Due    ANNUAL REVIEW OF HM ORDERS  Never done    COLORECTAL CANCER SCREENING  05/22/2024    MEDICARE ANNUAL WELLNESS VISIT  08/22/2024    INFLUENZA VACCINE (1) 09/01/2024    BMP  02/21/2025    LIPID  08/21/2025    FALL RISK ASSESSMENT  08/28/2025    MAMMO SCREENING  08/29/2025    GLUCOSE  08/21/2027    DEXA  10/10/2027    ADVANCE CARE PLANNING  09/13/2028    DTAP/TDAP/TD IMMUNIZATION (3 - Td or Tdap) 01/28/2030    HEPATITIS C SCREENING  Completed    PHQ-2 (once per calendar year)  Completed    Pneumococcal Vaccine: 65+ Years  Completed    ZOSTER IMMUNIZATION  Completed    RSV VACCINE (Pregnancy & 60+)  Completed    COVID-19 Vaccine  Completed    HPV IMMUNIZATION  Aged Out    MENINGITIS IMMUNIZATION  Aged Out    RSV MONOCLONAL ANTIBODY  Aged Out         Review of  "Systems  CONSTITUTIONAL: NEGATIVE for fever, chills. Weight down 8 pounds in 1 year  INTEGUMENTARY/SKIN: NEGATIVE for worrisome rashes, moles or lesions  EYES: NEGATIVE for irritation. Has glasses. Mild acuity reduction . Eye exam Spring 2024  ENT/MOUTH: NEGATIVE for ear pain, mouth and throat problems. Hx tinnitus and some mild hearing loss  RESP: NEGATIVE for significant cough. Mild chronic SOB with bending. Has right diaphragm paralysis. No shortness of breath with usual walking upright  BREAST: NEGATIVE for masses, tenderness or discharge  CV: NEGATIVE for chest pain, rcent palpitations or peripheral edema.  Rare tachycardia,  Neg stress test 2022  GI: NEGATIVE for nausea, abdominal pain, heartburn, or change in bowel habits  : NEGATIVE for frequency, dysuria, or hematuria., Has some urinary  urge  incontinence.  Off  caffeine.  Using a pad  MUSCULOSKELETAL:  POSITIVE for stable osteoarthritis with  Meloxicam.    NEURO: NEGATIVE for weakness, dizziness. Rare  foot  paresthesias  ENDOCRINE: NEGATIVE for temperature intolerance.  HEME: NEGATIVE for bleeding problems  PSYCHIATRIC: POSITIVE for election-related stress. Marriage good. Denies actual depression     Objective    Exam  /72   Pulse 90   Temp 97.7  F (36.5  C) (Temporal)   Ht 1.676 m (5' 6\")   Wt 68.9 kg (151 lb 12.8 oz)   LMP 07/27/2009   SpO2 99%   BMI 24.50 kg/m     Estimated body mass index is 24.5 kg/m  as calculated from the following:    Height as of this encounter: 1.676 m (5' 6\").    Weight as of this encounter: 68.9 kg (151 lb 12.8 oz).    Physical Exam   General appearance - healthy, alert, no distress  Skin - No rashes or lesions.  Head - normocephalic, atraumatic  Eyes - CHRISTINA, EOMI, fundi exam with nondilated pupils negative.  Ears - External ears normal. Canals clear. TM's normal.  Nose/Sinuses - Nares normal. Septum midline. Mucosa normal. No drainage or sinus tenderness.  Oropharynx - No erythema, no adenopathy, no " exudates.  Neck - Supple without adenopathy or thyromegaly. No bruits.  Lungs - Clear to auscultation without wheezes/rhonchi.  Heart - Regular rate and rhythm without murmurs, clicks, or gallops.  Nodes - No supraclavicular, axillary, or inguinal adenopathy palpable.  Breasts - deferred  Abdomen - Abdomen soft, non-tender. BS normal. No masses or hepatosplenomegaly palpable. No bruits.  Extremities -No cyanosis, clubbing or edema.    Musculoskeletal - Spine ROM normal. Muscular strength intact.  DIP joints hands bilaterally with osteoarthritis thickening. No erythema  Peripheral pulses - radial=4/4, femoral=4/4, posterior tibial=4/4, dorsalis pedis=4/4,  Neuro - Gait normal. Reflexes normal and symmetric. Sensation grossly WNL.  Genital/Rectal - deferred           8/28/2024   Mini Cog   Clock Draw Score 2 Normal   3 Item Recall 3 objects recalled   Mini Cog Total Score 5             Signed Electronically by: Omid Rivera MD

## 2024-09-19 ENCOUNTER — TELEPHONE (OUTPATIENT)
Dept: GASTROENTEROLOGY | Facility: CLINIC | Age: 74
End: 2024-09-19
Payer: COMMERCIAL

## 2024-09-19 NOTE — TELEPHONE ENCOUNTER
"Endoscopy Scheduling Screen    Have you had any respiratory illness or flu-like symptoms in the last 10 days?  No    What is your communication preference for Instructions and/or Bowel Prep?   MyChart    What insurance is in the chart?  Other:  [employer]    Ordering/Referring Provider: Omid Rivera MD in  INTERNAL MEDICINE   (If ordering provider performs procedure, schedule with ordering provider unless otherwise instructed. )    BMI: Estimated body mass index is 24.5 kg/m  as calculated from the following:    Height as of 8/28/24: 1.676 m (5' 6\").    Weight as of 8/28/24: 68.9 kg (151 lb 12.8 oz).     Sedation Ordered  moderate sedation.   If patient BMI > 50 do not schedule in [facility].    If patient BMI > 45 do not schedule at [facility].    Are you taking methadone or Suboxone?  NO, No RN review required.    Have you been diagnosed and are being treated for severe PTSD or severe anxiety?  NO, No RN review required.    Are you taking any prescription medications for pain 3 or more times per week?   NO, No RN review required.    Do you have a history of malignant hyperthermia?  No    (Females) Are you currently pregnant?   No     Have you been diagnosed or told you have pulmonary hypertension?   No    Do you have an LVAD?  No    Have you been told you have moderate to severe sleep apnea?  No.    Have you been told you have COPD, asthma, or any other lung disease?  No    Do you have any heart conditions?  Yes     In the past year, have you had any hospitalizations for heart related issues including cardiomyopathy, heart attack, or stent placement?  No    Do you have any implantable devices in your body (pacemaker, ICD)?  No    Do you take nitroglycerine?  No    Have you ever had or are you waiting for an organ transplant?  No. Continue scheduling, no site restrictions.    Have you had a stroke or transient ischemic attack (TIA aka \"mini stroke\" in the last 6 months?   No    Have you been diagnosed with or been told you have " "cirrhosis of the liver?   No.    Are you currently on dialysis?   No    Do you need assistance transferring?   No    BMI: Estimated body mass index is 24.5 kg/m  as calculated from the following:    Height as of 8/28/24: 1.676 m (5' 6\").    Weight as of 8/28/24: 68.9 kg (151 lb 12.8 oz).     Is patients BMI > 40 and scheduling location [facility]?  No    Do you take an injectable or oral medication for weight loss or diabetes (excluding insulin)?  No    Do you take the medication Naltrexone?  No    Do you take blood thinners?  Yes, you must contact your prescribing provider for directions on holding or bridging with a different medication.     Prep   Are you currently on dialysis or do you have chronic kidney disease?  No    Do you have a diagnosis of diabetes?  No    Do you have a diagnosis of cystic fibrosis (CF)?  No    On a regular basis do you go 3 -5 days between bowel movements?  No    BMI > 40?  No    Preferred Pharmacy:    [facility] [address] [place], [place] [address] - [address]  [address] [facility] [address]  Phone: [phone] Fax: [phone]      Final Scheduling Details     Procedure scheduled  Colonoscopy    Surgeon:  Kady     Date of procedure:  10.30.24     Pre-OP / PAC:   No - Not required for this site.    Location  [facility] - Patient preference.    Sedation   Moderate Sedation - Per order.      Patient Reminders:   You will receive a call from a Nurse to review instructions and health history.  This assessment must be completed prior to your procedure.  Failure to complete the Nurse assessment may result in the procedure being cancelled.      On the day of your procedure, please designate an adult(s) who can drive you home stay with you for the next 24 hours. The medicines used in the exam will make you sleepy. You will not be able to drive.      You cannot take public transportation, ride share services, or non-medical taxi service without a responsible caregiver.  Medical transport services " are allowed with the requirement that a responsible caregiver will receive you at your destination.  We require that drivers and caregivers are confirmed prior to your procedure.

## 2024-10-09 NOTE — TELEPHONE ENCOUNTER
Standard Miralax Bowel Prep recommended due to standard bowel prep. Instructions were sent via [employer].

## 2024-10-15 ENCOUNTER — TELEPHONE (OUTPATIENT)
Dept: GASTROENTEROLOGY | Facility: CLINIC | Age: 74
End: 2024-10-15
Payer: COMMERCIAL

## 2024-10-15 NOTE — TELEPHONE ENCOUNTER
"Pre visit planning completed.      Procedure details:    Patient scheduled for Colonoscopy on 10.30.2024.     Arrival time: 1200. Procedure time 1245    Facility location: [facility]; [address] [place] [address]. Check in location: Main entrance, door #1 on the North side of the building under roundabout awning. DO NOT GO TO SURGERY/ED ENTRANCE.     Sedation type: Conscious sedation     Pre op exam needed? No.    Indication for procedure: Screen for colon cancer       Chart review:     Electronic implanted devices? No    Recent diagnosis of diverticulitis within the last 6 weeks? No      Medication review:    Diabetic? No    Anticoagulants? No-Verify with patient as it is marked \"yes\" on telephone screening questions.     Weight loss medication/injectable? No GLP-1 medication per patient's medication list.  RN will verify with pre-assessment call.    Other medication HOLDING recommendations:  N/A      Prep for procedure:     Bowel prep recommendation: Standard Miralax  Due to: standard bowel prep.    Prep instructions sent via [employer]         Rhea Becerril RN  Endoscopy Procedure Pre Assessment RN  [phone] option 2    "

## 2024-10-15 NOTE — TELEPHONE ENCOUNTER
Pt sent a message requesting prep instructions to be sent via email at [email or web address].     Standard Miralax Prep instructions sent 10/15/24.     Mary Escalona RN, BSN  Endoscopy Procedure Pre Assessment RN   [phone] (option 2)

## 2024-10-16 NOTE — TELEPHONE ENCOUNTER
Pre assessment completed for upcoming procedure.   (Please see previous telephone encounter notes for complete details)      Procedure details:    Arrival time and facility location reviewed.    Pre op exam needed? No.    Designated  policy reviewed. Instructed to have someone stay 6  hours post procedure.       Medication review:    Medications reviewed. Please see supporting documentation below. Holding recommendations discussed (if applicable).   N/A  Patient answered yes to taking blood thinners d/t taking meloxicam     Prep for procedure:     Procedure prep instructions reviewed.        Any additional information needed:  N/A      Patient  verbalized understanding and had no questions or concerns at this time.      Rhea Becerril RN  Endoscopy Procedure Pre Assessment   [phone] option 2

## 2024-10-30 ENCOUNTER — HOSPITAL ENCOUNTER (OUTPATIENT)
Facility: CLINIC | Age: 74
Discharge: HOME | End: 2024-10-30
Attending: INTERNAL MEDICINE | Admitting: INTERNAL MEDICINE
Payer: COMMERCIAL

## 2024-10-30 VITALS
DIASTOLIC BLOOD PRESSURE: 72 MMHG | HEART RATE: 73 BPM | OXYGEN SATURATION: 98 % | SYSTOLIC BLOOD PRESSURE: 130 MMHG | WEIGHT: 150 LBS | RESPIRATION RATE: 16 BRPM | HEIGHT: 66 IN | BODY MASS INDEX: 24.11 KG/M2

## 2024-10-30 LAB — COLONOSCOPY: NORMAL

## 2024-10-30 PROCEDURE — 45385 COLONOSCOPY W/LESION REMOVAL: CPT | Performed by: INTERNAL MEDICINE

## 2024-10-30 PROCEDURE — 45380 COLONOSCOPY AND BIOPSY: CPT | Mod: XS,PT | Performed by: INTERNAL MEDICINE

## 2024-10-30 PROCEDURE — 88305 TISSUE EXAM BY PATHOLOGIST: CPT | Mod: TC | Performed by: INTERNAL MEDICINE

## 2024-10-30 PROCEDURE — G0500 MOD SEDAT ENDO SERVICE >5YRS: HCPCS | Mod: PT | Performed by: INTERNAL MEDICINE

## 2024-10-30 PROCEDURE — 99153 MOD SED SAME PHYS/QHP EA: CPT | Performed by: INTERNAL MEDICINE

## 2024-10-30 PROCEDURE — 250N000011 HC RX IP 250 OP 636: Performed by: INTERNAL MEDICINE

## 2024-10-30 RX ORDER — ONDANSETRON 2 MG/ML
4 INJECTION INTRAMUSCULAR; INTRAVENOUS
Status: DISCONTINUED | OUTPATIENT
Start: 2024-10-30 | End: 2024-10-30 | Stop reason: HOSPADM

## 2024-10-30 RX ORDER — FLUMAZENIL 0.1 MG/ML
0.2 INJECTION, SOLUTION INTRAVENOUS
Status: DISCONTINUED | OUTPATIENT
Start: 2024-10-30 | End: 2024-10-30 | Stop reason: HOSPADM

## 2024-10-30 RX ORDER — ONDANSETRON 4 MG/1
4 TABLET, ORALLY DISINTEGRATING ORAL EVERY 6 HOURS PRN
Status: DISCONTINUED | OUTPATIENT
Start: 2024-10-30 | End: 2024-10-30 | Stop reason: HOSPADM

## 2024-10-30 RX ORDER — ONDANSETRON 2 MG/ML
4 INJECTION INTRAMUSCULAR; INTRAVENOUS EVERY 6 HOURS PRN
Status: DISCONTINUED | OUTPATIENT
Start: 2024-10-30 | End: 2024-10-30 | Stop reason: HOSPADM

## 2024-10-30 RX ORDER — SIMETHICONE 40MG/0.6ML
133 SUSPENSION, DROPS(FINAL DOSAGE FORM)(ML) ORAL
Status: DISCONTINUED | OUTPATIENT
Start: 2024-10-30 | End: 2024-10-30 | Stop reason: HOSPADM

## 2024-10-30 RX ORDER — FENTANYL CITRATE 50 UG/ML
50-100 INJECTION, SOLUTION INTRAMUSCULAR; INTRAVENOUS EVERY 5 MIN PRN
Status: DISCONTINUED | OUTPATIENT
Start: 2024-10-30 | End: 2024-10-30 | Stop reason: HOSPADM

## 2024-10-30 RX ORDER — NALOXONE HYDROCHLORIDE 0.4 MG/ML
0.4 INJECTION, SOLUTION INTRAMUSCULAR; INTRAVENOUS; SUBCUTANEOUS
Status: DISCONTINUED | OUTPATIENT
Start: 2024-10-30 | End: 2024-10-30 | Stop reason: HOSPADM

## 2024-10-30 RX ORDER — ATROPINE SULFATE 0.1 MG/ML
1 INJECTION INTRAVENOUS
Status: DISCONTINUED | OUTPATIENT
Start: 2024-10-30 | End: 2024-10-30 | Stop reason: HOSPADM

## 2024-10-30 RX ORDER — PROCHLORPERAZINE MALEATE 5 MG/1
5 TABLET ORAL EVERY 6 HOURS PRN
Status: DISCONTINUED | OUTPATIENT
Start: 2024-10-30 | End: 2024-10-30 | Stop reason: HOSPADM

## 2024-10-30 RX ORDER — LIDOCAINE 40 MG/G
CREAM TOPICAL
Status: DISCONTINUED | OUTPATIENT
Start: 2024-10-30 | End: 2024-10-30 | Stop reason: HOSPADM

## 2024-10-30 RX ORDER — EPINEPHRINE 1 MG/ML
0.1 INJECTION, SOLUTION, CONCENTRATE INTRAVENOUS
Status: DISCONTINUED | OUTPATIENT
Start: 2024-10-30 | End: 2024-10-30 | Stop reason: HOSPADM

## 2024-10-30 RX ORDER — DIPHENHYDRAMINE HYDROCHLORIDE 50 MG/ML
25-50 INJECTION INTRAMUSCULAR; INTRAVENOUS
Status: DISCONTINUED | OUTPATIENT
Start: 2024-10-30 | End: 2024-10-30 | Stop reason: HOSPADM

## 2024-10-30 RX ORDER — NALOXONE HYDROCHLORIDE 0.4 MG/ML
0.2 INJECTION, SOLUTION INTRAMUSCULAR; INTRAVENOUS; SUBCUTANEOUS
Status: DISCONTINUED | OUTPATIENT
Start: 2024-10-30 | End: 2024-10-30 | Stop reason: HOSPADM

## 2024-10-30 RX ADMIN — FENTANYL CITRATE 50 MCG: 50 INJECTION, SOLUTION INTRAMUSCULAR; INTRAVENOUS at 12:45

## 2024-10-30 RX ADMIN — MIDAZOLAM 1 MG: 1 INJECTION INTRAMUSCULAR; INTRAVENOUS at 12:45

## 2024-10-30 ASSESSMENT — ACTIVITIES OF DAILY LIVING (ADL)
ADLS_ACUITY_SCORE: 0
ADLS_ACUITY_SCORE: 0

## 2024-10-30 NOTE — H&P
Pre-Endoscopy History and Physical     Yandy Zamudio MRN# [id number]   YOB: 1950 Age: 74 year old     Date of Procedure: 10/30/2024  Primary care provider: Omid Rivera  Type of Endoscopy: Colonoscopy with possible biopsy, possible polypectomy  Reason for Procedure: screen  Type of Anesthesia Anticipated: Conscious Sedation    HPI:    Yandy is a 74 year old female who will be undergoing the above procedure.      A history and physical has been performed. The patient's medications and allergies have been reviewed. The risks and benefits of the procedure and the sedation options and risks were discussed with the patient.  All questions were answered and informed consent was obtained.      She denies a personal or family history of anesthesia complications or bleeding disorders.     Patient Active Problem List   Diagnosis    Essential hypertension    Colon polyps    Fibromyalgia    Hemoglobinopathy (H)    Primary osteoarthritis, unspecified site    Hyperlipidemia LDL goal <100    Osteopenia, unspecified location        Past Medical History:   Diagnosis Date    Arthritis 2016    Osteoarthritis in hands and feet and knees    Blood glucose elevated 04/27/2014    Cataract     Colon polyps 01/2008    Tubulovillous adenoma. 4 mm polyp in the ascending col    Depressive disorder 1982    Diplopia     Essential hypertension 09/20/2006    Problem list name updated by automated process. Provider to review      Fibromyalgia 10/12/2014    Hemoglobinopathy (H) 06/21/2020    Hyperlipidemia LDL goal <100 09/25/2022    Hyperlipidemia LDL goal <130 10/31/2010    Major depression     Menopause 2008    rare hot flash    Myalgias     Osteopenia, unspecified location 09/13/2023    Primary osteoarthritis, unspecified site 09/25/2022    Skin cancer     Unspecified essential hypertension         Past Surgical History:   Procedure Laterality Date    BIOPSY  1986    Breast biopsy, negative    COLONOSCOPY  2015    negative     ENT SURGERY      tonsillectomy    EYE SURGERY  [id number]    Cataract surgeries    GYN SURGERY  [id number]    2 C-sections and a tubal ligation    ORTHOPEDIC SURGERY      ex-ostosis, bone spur removal left femur    Z NONSPECIFIC PROCEDURE       x2    Z NONSPECIFIC PROCEDURE  1961    Left femur bone growth    Z NONSPECIFIC PROCEDURE      S/P T&A    Z NONSPECIFIC PROCEDURE  1980s    Breast Biopsy left       Social History     Tobacco Use    Smoking status: Never     Passive exposure: Past    Smokeless tobacco: Never   Substance Use Topics    Alcohol use: Not Currently     Comment: once in a while, small amount       Family History   Problem Relation Age of Onset    Hypertension Father          of bladder cancer    C.A.D. Father     Cancer Father         bladder  at age 88    Skin Cancer Father     Other Cancer Father         Bladder Cancer    Depression Father     Substance Abuse Father         Alcoholic    Respiratory Mother         COPD    Cerebrovascular Disease Mother     Depression Mother     Skin Cancer Brother     Cerebrovascular Disease Brother     Coronary Artery Disease Maternal Grandfather         Heart Attack    Coronary Artery Disease Maternal Grandmother         Extreme high blood preasure with enlarged heart    Hypertension Maternal Grandmother     Coronary Artery Disease Paternal Grandfather         Ruptured Aorta    Hypertension Paternal Grandfather     Thyroid Disease Paternal Grandmother         Thyroid removed    Hypertension Brother         Stroke    Cerebrovascular Disease Brother        Prior to Admission medications    Medication Sig Start Date End Date Taking? Authorizing Provider   atorvastatin (LIPITOR) 20 MG tablet Take 1 tablet (20 mg) by mouth daily 24   Omid Rivera MD   lisinopril (ZESTRIL) 40 MG tablet Take 1 tablet (40 mg) by mouth daily. 24   Omid Rivera MD   meloxicam (MOBIC) 15 MG tablet Take 1 tablet (15 mg) by mouth at bedtime.  "8/28/24   Omid Rivera MD   metoprolol succinate ER (TOPROL XL) 25 MG 24 hr tablet Take 1 tablet (25 mg) by mouth daily. 8/28/24   Omid Rivera MD   multivitamin, therapeutic with minerals (MULTI-VITAMIN) TABS tablet Take 1 tablet by mouth daily    Reported, Patient       Allergies   Allergen Reactions    Pravastatin      Myalgias      Trazodone      excess fatigue        REVIEW OF SYSTEMS:   5 point ROS negative except as noted above in HPI, including Gen., Resp., CV, GI &  system review.    PHYSICAL EXAM:   [facility] 07/27/2009  Estimated body mass index is 24.5 kg/m  as calculated from the following:    Height as of 8/28/24: 1.676 m (5' 6\").    Weight as of 8/28/24: 68.9 kg (151 lb 12.8 oz).   GENERAL APPEARANCE: alert, and oriented  MENTAL STATUS: alert  AIRWAY EXAM: Mallampatti Class I (visualization of the soft palate, fauces, uvula, anterior and posterior pillars)  RESP: lungs clear to auscultation - no rales, rhonchi or wheezes  CV: regular rates and rhythm  DIAGNOSTICS:    Not indicated    IMPRESSION   ASA Class 2 - Mild systemic disease    PLAN:   Plan for Colonoscopy with possible biopsy, possible polypectomy. We discussed the risks, benefits and alternatives and the patient wished to proceed.    The above has been forwarded to the consulting provider.      Signed Electronically by: Sebastian Hillman MD  October 30, 2024          "

## 2024-11-04 LAB
PATH REPORT.COMMENTS IMP SPEC: NORMAL
PATH REPORT.COMMENTS IMP SPEC: NORMAL
PATH REPORT.FINAL DX SPEC: NORMAL
PATH REPORT.GROSS SPEC: NORMAL
PATH REPORT.MICROSCOPIC SPEC OTHER STN: NORMAL
PATH REPORT.RELEVANT HX SPEC: NORMAL
PHOTO IMAGE: NORMAL

## 2024-11-04 PROCEDURE — 88305 TISSUE EXAM BY PATHOLOGIST: CPT | Mod: 26

## 2025-04-10 DIAGNOSIS — I10 ESSENTIAL HYPERTENSION: ICD-10-CM

## 2025-04-10 RX ORDER — LISINOPRIL 40 MG/1
40 TABLET ORAL DAILY
Qty: 90 TABLET | Refills: 1 | Status: SHIPPED | OUTPATIENT
Start: 2025-04-10

## 2025-04-17 DIAGNOSIS — M19.90 OSTEOARTHRITIS, UNSPECIFIED OSTEOARTHRITIS TYPE, UNSPECIFIED SITE: ICD-10-CM

## 2025-04-17 DIAGNOSIS — E78.5 HYPERLIPIDEMIA LDL GOAL <100: ICD-10-CM

## 2025-04-17 RX ORDER — MELOXICAM 15 MG/1
15 TABLET ORAL AT BEDTIME
Qty: 90 TABLET | Refills: 1 | Status: SHIPPED | OUTPATIENT
Start: 2025-04-17

## 2025-04-17 RX ORDER — ATORVASTATIN CALCIUM 20 MG/1
20 TABLET, FILM COATED ORAL DAILY
Qty: 90 TABLET | Refills: 1 | Status: SHIPPED | OUTPATIENT
Start: 2025-04-17

## 2025-04-21 DIAGNOSIS — R00.0 TACHYCARDIA: ICD-10-CM

## 2025-04-22 RX ORDER — METOPROLOL SUCCINATE 25 MG/1
25 TABLET, EXTENDED RELEASE ORAL DAILY
Qty: 90 TABLET | Refills: 0 | Status: SHIPPED | OUTPATIENT
Start: 2025-04-22

## 2025-05-01 ENCOUNTER — OFFICE VISIT (OUTPATIENT)
Dept: DERMATOLOGY | Facility: CLINIC | Age: 75
End: 2025-05-01
Payer: COMMERCIAL

## 2025-05-01 DIAGNOSIS — L82.1 SEBORRHEIC KERATOSES: ICD-10-CM

## 2025-05-01 DIAGNOSIS — D23.9 DERMAL NEVUS: ICD-10-CM

## 2025-05-01 DIAGNOSIS — D18.01 ANGIOMA OF SKIN: ICD-10-CM

## 2025-05-01 DIAGNOSIS — L81.4 LENTIGO: ICD-10-CM

## 2025-05-01 DIAGNOSIS — L57.0 AK (ACTINIC KERATOSIS): Primary | ICD-10-CM

## 2025-05-01 NOTE — LETTER
5/1/2025      Yandy Zamudio  [address] [address]      Dear Colleague,    Thank you for referring your patient, Yandy Zamudio, to the [facility]. Please see a copy of my visit note below.    Yandy Zamudio is an extremely pleasant 75 year old year old female patient here today for spot on face and hand.  Patient has no other skin complaints today.  Remainder of the HPI, Meds, PMH, Allergies, FH, and SH was reviewed in chart.      Past Medical History:   Diagnosis Date     Arthritis 2016    Osteoarthritis in hands and feet and knees     Blood glucose elevated 04/27/2014     Cataract      Colon polyps 01/2008    Tubulovillous adenoma. 4 mm polyp in the ascending col     Depressive disorder 1982     Diplopia      Essential hypertension 09/20/2006    Problem list name updated by automated process. Provider to review       Fibromyalgia 10/12/2014     Hemoglobinopathy 06/21/2020     Hyperlipidemia LDL goal <100 09/25/2022     Hyperlipidemia LDL goal <130 10/31/2010     Major depression      Menopause 2008    rare hot flash     Myalgias      Osteopenia, unspecified location 09/13/2023     Primary osteoarthritis, unspecified site 09/25/2022     Skin cancer      Unspecified essential hypertension        Past Surgical History:   Procedure Laterality Date     BIOPSY  1986    Breast biopsy, negative     COLONOSCOPY  2015    negative     COLONOSCOPY N/A 10/30/2024    Procedure: Colonoscopy with polypectomies using jumbo forceps and exacto snare;  Surgeon: Sebastian Hillman MD;  Location:  GI     COLONOSCOPY N/A 10/30/2024    Procedure: COLONOSCOPY, FLEXIBLE, WITH LESION REMOVAL USING SNARE;  Surgeon: Sebastian Hillman MD;  Location:  GI     ENT SURGERY  1957    tonsillectomy     EYE SURGERY  [id number]    Cataract surgeries     GYN SURGERY  [id number]    2 C-sections and a tubal ligation     ORTHOPEDIC SURGERY  1961    ex-ostosis, bone spur removal left femur     ZZC  NONSPECIFIC PROCEDURE       x2     ZZC NONSPECIFIC PROCEDURE  1961    Left femur bone growth     ZZC NONSPECIFIC PROCEDURE      S/P T&A     ZZC NONSPECIFIC PROCEDURE  1980s    Breast Biopsy left        Family History   Problem Relation Age of Onset     Respiratory Mother         COPD     Cerebrovascular Disease Mother      Depression Mother      Hypertension Father          of bladder cancer     C.A.D. Father      Cancer Father         bladder  at age 88     Skin Cancer Father      Other Cancer Father         Bladder Cancer     Depression Father      Substance Abuse Father         Alcoholic     Coronary Artery Disease Maternal Grandmother         Extreme high blood preasure with enlarged heart     Hypertension Maternal Grandmother      Coronary Artery Disease Maternal Grandfather         Heart Attack     Thyroid Disease Paternal Grandmother         Thyroid removed     Coronary Artery Disease Paternal Grandfather         Ruptured Aorta     Hypertension Paternal Grandfather      Skin Cancer Brother      Cerebrovascular Disease Brother      Hypertension Brother         Stroke     Cerebrovascular Disease Brother      Colon Cancer No family hx of        Social History     Socioeconomic History     Marital status:      Spouse name: Not on file     Number of children: Not on file     Years of education: Not on file     Highest education level: Not on file   Occupational History     Not on file   Tobacco Use     Smoking status: Never     Passive exposure: Past     Smokeless tobacco: Never   Vaping Use     Vaping status: Never Used   Substance and Sexual Activity     Alcohol use: Not Currently     Comment: once in a while, small amount     Drug use: No     Sexual activity: Not Currently     Partners: Male     Birth control/protection: Female Surgical     Comment: tubal ligation   Other Topics Concern     Parent/sibling w/ CABG, MI or angioplasty before 65F 55M? No   Social History Narrative      Not on file     Social Drivers of Health     Financial Resource Strain: Low Risk  (8/26/2024)    Financial Resource Strain      Within the past 12 months, have you or your family members you live with been unable to get utilities (heat, electricity) when it was really needed?: No   Food Insecurity: Low Risk  (8/26/2024)    Food Insecurity      Within the past 12 months, did you worry that your food would run out before you got money to buy more?: No      Within the past 12 months, did the food you bought just not last and you didn t have money to get more?: No   Transportation Needs: Low Risk  (8/26/2024)    Transportation Needs      Within the past 12 months, has lack of transportation kept you from medical appointments, getting your medicines, non-medical meetings or appointments, work, or from getting things that you need?: No   Physical Activity: Insufficiently Active (8/26/2024)    Exercise Vital Sign      Days of Exercise per Week: 1 day      Minutes of Exercise per Session: 60 min   Stress: Stress Concern Present (8/26/2024)    [employer] - Occupational Stress Questionnaire      Feeling of Stress : Rather much   Social Connections: Unknown (8/26/2024)    Social Connection and Isolation Panel [NHANES]      Frequency of Communication with Friends and Family: Not on file      Frequency of Social Gatherings with Friends and Family: Never      Attends Religion Services: Not on file      Active Member of Clubs or Organizations: Not on file      Attends Club or Organization Meetings: Not on file      Marital Status: Not on file   Interpersonal Safety: Low Risk  (8/28/2024)    Interpersonal Safety      Do you feel physically and emotionally safe where you currently live?: Yes      Within the past 12 months, have you been hit, slapped, kicked or otherwise physically hurt by someone?: No      Within the past 12 months, have you been humiliated or emotionally abused in other ways by your  partner or ex-partner?: No   Housing Stability: Low Risk  (8/26/2024)    Housing Stability      Do you have housing? : Yes      Are you worried about losing your housing?: No       Outpatient Encounter Medications as of 5/1/2025   Medication Sig Dispense Refill     atorvastatin (LIPITOR) 20 MG tablet Take 1 tablet by mouth once daily 90 tablet 1     lisinopril (ZESTRIL) 40 MG tablet Take 1 tablet by mouth once daily 90 tablet 1     meloxicam (MOBIC) 15 MG tablet TAKE 1 TABLET BY MOUTH AT BEDTIME 90 tablet 1     metoprolol succinate ER (TOPROL XL) 25 MG 24 hr tablet Take 1 tablet by mouth once daily 90 tablet 0     multivitamin, therapeutic with minerals (MULTI-VITAMIN) TABS tablet Take 1 tablet by mouth daily       No facility-administered encounter medications on file as of 5/1/2025.             O:   NAD, WDWN, Alert & Oriented, Mood & Affect wnl, Vitals stable   General appearance normal   Vitals stable   Alert, oriented and in no acute distress     Nose and L hand gritty scaly papule    Stuck on papules and brown macules on trunk and ext   Red papules on trunk  Flesh colored papules on face         Eyes: Conjunctivae/lids:Normal     ENT: Lips, mucosa: normal    MSK:Normal    Cardiovascular: peripheral edema none    Pulm: Breathing Normal    Neuro/Psych: Orientation:Alert and Orientedx3 ; Mood/Affect:normal       A/P:  1. Seborrheic keratosis, lentigo, angioma, dermal nevus, hx of non-melanoma skin cancer   2. Actinic keratosis   Nose and L hand   LN2:  Treated with LN2 for 5s for 1-2 cycles. Warned risks of blistering, pain, pigment change, scarring, and incomplete resolution.  Advised patient to return if lesions do not completely resolve.  Wound care sheet given.  It was a pleasure speaking to Yandy Zamudio today.  Previous clinic notes and pertinent laboratory tests were reviewed prior to Yandy Zamudio's visit.  Nature and genetics of benign skin lesions dicussed with patient.  Patient encouraged to  perform monthly skin exams.  UV precautions reviewed with patient.  Return to clinic 12 months      Again, thank you for allowing me to participate in the care of your patient.        Sincerely,        Bridger Arellano MD    Electronically signed

## 2025-05-01 NOTE — PROGRESS NOTES
Yandy Zamudio is an extremely pleasant 75 year old year old female patient here today for spot on face and hand.  Patient has no other skin complaints today.  Remainder of the HPI, Meds, PMH, Allergies, FH, and SH was reviewed in chart.      Past Medical History:   Diagnosis Date    Arthritis 2016    Osteoarthritis in hands and feet and knees    Blood glucose elevated 2014    Cataract     Colon polyps 2008    Tubulovillous adenoma. 4 mm polyp in the ascending col    Depressive disorder 1982    Diplopia     Essential hypertension 2006    Problem list name updated by automated process. Provider to review      Fibromyalgia 10/12/2014    Hemoglobinopathy 2020    Hyperlipidemia LDL goal <100 2022    Hyperlipidemia LDL goal <130 10/31/2010    Major depression     Menopause     rare hot flash    Myalgias     Osteopenia, unspecified location 2023    Primary osteoarthritis, unspecified site 2022    Skin cancer     Unspecified essential hypertension        Past Surgical History:   Procedure Laterality Date    BIOPSY      Breast biopsy, negative    COLONOSCOPY      negative    COLONOSCOPY N/A 10/30/2024    Procedure: Colonoscopy with polypectomies using jumbo forceps and exacto snare;  Surgeon: Sebastian Hillman MD;  Location:  GI    COLONOSCOPY N/A 10/30/2024    Procedure: COLONOSCOPY, FLEXIBLE, WITH LESION REMOVAL USING SNARE;  Surgeon: Sebastian Hillman MD;  Location:  GI    ENT SURGERY      tonsillectomy    EYE SURGERY  [id number]    Cataract surgeries    GYN SURGERY  [id number]    2 C-sections and a tubal ligation    ORTHOPEDIC SURGERY      ex-ostosis, bone spur removal left femur    ZZC NONSPECIFIC PROCEDURE       x2    ZZC NONSPECIFIC PROCEDURE  1961    Left femur bone growth    ZZC NONSPECIFIC PROCEDURE      S/P T&A    ZZC NONSPECIFIC PROCEDURE      Breast Biopsy left        Family History   Problem Relation Age of Onset     Respiratory Mother         COPD    Cerebrovascular Disease Mother     Depression Mother     Hypertension Father          of bladder cancer    C.A.D. Father     Cancer Father         bladder  at age 88    Skin Cancer Father     Other Cancer Father         Bladder Cancer    Depression Father     Substance Abuse Father         Alcoholic    Coronary Artery Disease Maternal Grandmother         Extreme high blood preasure with enlarged heart    Hypertension Maternal Grandmother     Coronary Artery Disease Maternal Grandfather         Heart Attack    Thyroid Disease Paternal Grandmother         Thyroid removed    Coronary Artery Disease Paternal Grandfather         Ruptured Aorta    Hypertension Paternal Grandfather     Skin Cancer Brother     Cerebrovascular Disease Brother     Hypertension Brother         Stroke    Cerebrovascular Disease Brother     Colon Cancer No family hx of        Social History     Socioeconomic History    Marital status:      Spouse name: Not on file    Number of children: Not on file    Years of education: Not on file    Highest education level: Not on file   Occupational History    Not on file   Tobacco Use    Smoking status: Never     Passive exposure: Past    Smokeless tobacco: Never   Vaping Use    Vaping status: Never Used   Substance and Sexual Activity    Alcohol use: Not Currently     Comment: once in a while, small amount    Drug use: No    Sexual activity: Not Currently     Partners: Male     Birth control/protection: Female Surgical     Comment: tubal ligation   Other Topics Concern    Parent/sibling w/ CABG, MI or angioplasty before 65F 55M? No   Social History Narrative    Not on file     Social Drivers of Health     Financial Resource Strain: Low Risk  (2024)    Financial Resource Strain     Within the past 12 months, have you or your family members you live with been unable to get utilities (heat, electricity) when it was really needed?: No   Food Insecurity:  Low Risk  (8/26/2024)    Food Insecurity     Within the past 12 months, did you worry that your food would run out before you got money to buy more?: No     Within the past 12 months, did the food you bought just not last and you didn t have money to get more?: No   Transportation Needs: Low Risk  (8/26/2024)    Transportation Needs     Within the past 12 months, has lack of transportation kept you from medical appointments, getting your medicines, non-medical meetings or appointments, work, or from getting things that you need?: No   Physical Activity: Insufficiently Active (8/26/2024)    Exercise Vital Sign     Days of Exercise per Week: 1 day     Minutes of Exercise per Session: 60 min   Stress: Stress Concern Present (8/26/2024)    [employer] - Occupational Stress Questionnaire     Feeling of Stress : Rather much   Social Connections: Unknown (8/26/2024)    Social Connection and Isolation Panel [NHANES]     Frequency of Communication with Friends and Family: Not on file     Frequency of Social Gatherings with Friends and Family: Never     Attends Confucianist Services: Not on file     Active Member of Clubs or Organizations: Not on file     Attends Club or Organization Meetings: Not on file     Marital Status: Not on file   Interpersonal Safety: Low Risk  (8/28/2024)    Interpersonal Safety     Do you feel physically and emotionally safe where you currently live?: Yes     Within the past 12 months, have you been hit, slapped, kicked or otherwise physically hurt by someone?: No     Within the past 12 months, have you been humiliated or emotionally abused in other ways by your partner or ex-partner?: No   Housing Stability: Low Risk  (8/26/2024)    Housing Stability     Do you have housing? : Yes     Are you worried about losing your housing?: No       Outpatient Encounter Medications as of 5/1/2025   Medication Sig Dispense Refill    atorvastatin (LIPITOR) 20 MG tablet Take 1 tablet by  mouth once daily 90 tablet 1    lisinopril (ZESTRIL) 40 MG tablet Take 1 tablet by mouth once daily 90 tablet 1    meloxicam (MOBIC) 15 MG tablet TAKE 1 TABLET BY MOUTH AT BEDTIME 90 tablet 1    metoprolol succinate ER (TOPROL XL) 25 MG 24 hr tablet Take 1 tablet by mouth once daily 90 tablet 0    multivitamin, therapeutic with minerals (MULTI-VITAMIN) TABS tablet Take 1 tablet by mouth daily       No facility-administered encounter medications on file as of 5/1/2025.             O:   NAD, WDWN, Alert & Oriented, Mood & Affect wnl, Vitals stable   General appearance normal   Vitals stable   Alert, oriented and in no acute distress     Nose and L hand gritty scaly papule    Stuck on papules and brown macules on trunk and ext   Red papules on trunk  Flesh colored papules on face         Eyes: Conjunctivae/lids:Normal     ENT: Lips, mucosa: normal    MSK:Normal    Cardiovascular: peripheral edema none    Pulm: Breathing Normal    Neuro/Psych: Orientation:Alert and Orientedx3 ; Mood/Affect:normal       A/P:  1. Seborrheic keratosis, lentigo, angioma, dermal nevus, hx of non-melanoma skin cancer   2. Actinic keratosis   Nose and L hand   LN2:  Treated with LN2 for 5s for 1-2 cycles. Warned risks of blistering, pain, pigment change, scarring, and incomplete resolution.  Advised patient to return if lesions do not completely resolve.  Wound care sheet given.  It was a pleasure speaking to Yandy Zamudio today.  Previous clinic notes and pertinent laboratory tests were reviewed prior to Yandy Zamudio's visit.  Nature and genetics of benign skin lesions dicussed with patient.  Patient encouraged to perform monthly skin exams.  UV precautions reviewed with patient.  Return to clinic 12 months

## 2025-05-01 NOTE — PATIENT INSTRUCTIONS
WOUND CARE INSTRUCTIONS   FOR CRYOSURGERY       This area treated with liquid nitrogen should form a blister (areas treated may or may not blister-skin may just turn dark and slough off). You do not need to bandage the area unless a blister forms and breaks (which may be a few days). When the blister breaks, begin daily dressing changes as follows:     1) Clean and dry the area with tap water using clean Q-tip or sterile gauze pad.   2) Apply Polysporin, Aquaphor, Vaseline or Bacitracin ointment over entire wound. Do NOT use Neosporin ointment.   3) Cover the wound with a band-aid or sterile non-stick gauze pad and micropore paper tape.     REPEAT THESE INSTRUCTIONS AT LEAST ONCE A DAY UNTIL THE WOUND HAS COMPLETELY HEALED.   It is an old wives tale that a wound heals better when it is exposed to air and allowed to dry out. The wound will heal faster with a better cosmetic result if it is kept moist with ointment and covered with a bandage.   *Do not let the wound dry out*    Supplies Needed:     *Cotton tipped applicators (Q-tips)   *Aquaphor, Vaseline, Polysporin or Bacitracin ointment (NOT NEOSPORIN)   *Band-aids, or non stick gauze pads and micropore paper tape     PATIENT INFORMATION   During the healing process you will notice a number of changes. All wounds develop a small halo of redness surrounding the wound. This means healing is occurring.   Severe itching with extensive redness usually indicates sensitivity to the ointment or bandage tape used to dress the wound. You should call our office if this develops.   Swelling and/or discoloration around your surgical site is common, particularly when performed around the eye.   All wounds normally drain. The larger the wound the more drainage there will be. After 7-10 days, you will notice the wound beginning to shrink and new skin will begin to grow. The wound is healed when you can see skin has formed over the entire area. A healed wound has a healthy, shiny  look to the surface and is red to dark pink in color to normalize. Wounds may take approximately 4-6 weeks to heal. Larger wounds may take 6-8 weeks. After the wound is healed you may discontinue dressing changes.     You may experience a sensation of tightness as your wound heals. This is normal and will gradually subside.   Your healed wound may be sensitive to temperature changes. This sensitivity improves with time, but if you're having a lot of discomfort, try to avoid temperature extremes.     Patients frequently experience itching after their wound appears to have healed because of the continue healing under the skin. Plain Vaseline will help relieve the itching.            Proper skin care from [facility]:    -Eliminate harsh soaps as they strip the natural oils from the skin, often resulting in dry itchy skin ( i.e. Dial, Zest, Haley Spring)  -Use mild soaps such as Cetaphil or Dove Sensitive Skin in the shower. You do not need to use soap on arms, legs, and trunk every time you shower unless visibly soiled.   -Avoid hot or cold showers.  -After showering, lightly dry off and apply moisturizing within 2-3 minutes. This will help trap moisture in the skin.   -Aggressive use of a moisturizer at least 1-2 times a day to the entire body (including -Vanicream, Cetaphil, Aquaphor or Cerave) and moisturize hands after every washing.  -We recommend using moisturizers that come in a tub that needs to be scooped out, not a pump. This has more of an oil base. It will hold moisture in your skin much better than a water base moisturizer. The above recommended are non-pore clogging.      Wear a sunscreen with at least SPF 30 on your face, ears, neck and V of the chest daily. Wear sunscreen on other areas of the body if those areas are exposed to the sun throughout the day. Sunscreens can contain physical and/or chemical blockers. Physical blockers are less likely to clog pores, these include zinc oxide and  titanium dioxide. Reapply every two hour and after swimming.     Sunscreen examples: https://www.ewg.org/sunscreen/    UV radiation  UVA radiation remains constant throughout the day and throughout the year. It is a longer wavelength than UVB and therefore penetrates deeper into the skin leading to immediate and delayed tanning, photoaging, and skin cancer. 70-80% of UVA and UVB radiation occurs between the hours of 10am-2pm.  UVB radiation  UVB radiation causes the most harmful effects and is more significant during the summer months. However, snow and ice can reflect UVB radiation leading to skin damage during the winter months as well. UVB radiation is responsible for tanning, burning, inflammation, delayed erythema (pinkness), pigmentation (brown spots), and skin cancer.     I recommend self monthly full body exams and yearly full body exams with a dermatology provider. If you develop a new or changing lesion please follow up for examination. Most skin cancers are pink and scaly or pink and pearly. However, we do see blue/brown/black skin cancers.  Consider the ABCDEs of melanoma when giving yourself your monthly full body exam ( don't forget the groin, buttocks, feet, toes, etc). A-asymmetry, B-borders, C-color, D-diameter, E-elevation or evolving. If you see any of these changes please follow up in clinic. If you cannot see your back I recommend purchasing a hand held mirror to use with a larger wall mirror.       Checking for Skin Cancer  You can find cancer early by checking your skin each month. There are 3 kinds of skin cancer. They are melanoma, basal cell carcinoma, and squamous cell carcinoma. Doing monthly skin checks is the best way to find new marks or skin changes. Follow the instructions below for checking your skin.   The ABCDEs of checking moles for melanoma   Check your moles or growths for signs of melanoma using ABCDE:   Asymmetry: the sides of the mole or growth don t match  Border: the edges  are ragged, notched, or blurred  Color: the color within the mole or growth varies  Diameter: the mole or growth is larger than 6 mm (size of a pencil eraser)  Evolving: the size, shape, or color of the mole or growth is changing (evolving is not shown in the images below)    Checking for other types of skin cancer  Basal cell carcinoma or squamous cell carcinoma have symptoms such as:     A spot or mole that looks different from all other marks on your skin  Changes in how an area feels, such as itching, tenderness, or pain  Changes in the skin's surface, such as oozing, bleeding, or scaliness  A sore that does not heal  New swelling or redness beyond the border of a mole    Who s at risk?  Anyone can get skin cancer. But you are at greater risk if you have:   Fair skin, light-colored hair, or light-colored eyes  Many moles or abnormal moles on your skin  A history of sunburns from sunlight or tanning beds  A family history of skin cancer  A history of exposure to radiation or chemicals  A weakened immune system  If you have had skin cancer in the past, you are at risk for recurring skin cancer.   How to check your skin  Do your monthly skin checkups in front of a full-length mirror. Check all parts of your body, including your:   Head (ears, face, neck, and scalp)  Torso (front, back, and sides)  Arms (tops, undersides, upper, and lower armpits)  Hands (palms, backs, and fingers, including under the nails)  Buttocks and genitals  Legs (front, back, and sides)  Feet (tops, soles, toes, including under the nails, and between toes)  If you have a lot of moles, take digital photos of them each month. Make sure to take photos both up close and from a distance. These can help you see if any moles change over time.   Most skin changes are not cancer. But if you see any changes in your skin, call your doctor right away. Only he or she can diagnose a problem. If you have skin cancer, seeing your doctor can be the first  step toward getting the treatment that could save your life.   [employer] last reviewed this educational content on 4/1/2019 2000-2020 [employer]. [address] [place] [address], [place] [address]. All rights reserved. This information is not intended as a substitute for professional medical care. Always follow your healthcare professional's instructions.       When should I call my doctor?  If you are worsening or not improving, please, contact us or seek urgent care as noted below.     Who should I call with questions (adults)?    [facility] [phone]  For urgent needs outside of business hours call the [facility] at [phone] and ask for the dermatology resident on call to be paged  If this is a medical emergency and you are unable to reach an ER, Call [phone]      If you need a prescription refill, please contact your pharmacy. Refills are approved or denied by our Physicians during normal business hours, Monday through Friday.  Per office policy, refills will not be granted if you have not been seen within the past year (or sooner depending on the condition).

## 2025-07-14 DIAGNOSIS — R00.0 TACHYCARDIA: ICD-10-CM

## 2025-07-14 RX ORDER — METOPROLOL SUCCINATE 25 MG/1
25 TABLET, EXTENDED RELEASE ORAL DAILY
Qty: 90 TABLET | Refills: 0 | Status: SHIPPED | OUTPATIENT
Start: 2025-07-14

## 2025-07-30 ENCOUNTER — PATIENT OUTREACH (OUTPATIENT)
Dept: CARE COORDINATION | Facility: CLINIC | Age: 75
End: 2025-07-30
Payer: COMMERCIAL

## 2025-08-06 ENCOUNTER — ORDERS ONLY (AUTO-RELEASED) (OUTPATIENT)
Dept: EMERGENCY MEDICINE | Facility: CLINIC | Age: 75
End: 2025-08-06

## 2025-08-06 ENCOUNTER — OFFICE VISIT (OUTPATIENT)
Dept: URGENT CARE | Facility: URGENT CARE | Age: 75
End: 2025-08-06
Payer: COMMERCIAL

## 2025-08-06 ENCOUNTER — APPOINTMENT (OUTPATIENT)
Dept: CT IMAGING | Facility: CLINIC | Age: 75
End: 2025-08-06
Attending: EMERGENCY MEDICINE
Payer: COMMERCIAL

## 2025-08-06 ENCOUNTER — APPOINTMENT (OUTPATIENT)
Dept: GENERAL RADIOLOGY | Facility: CLINIC | Age: 75
End: 2025-08-06
Attending: EMERGENCY MEDICINE
Payer: COMMERCIAL

## 2025-08-06 ENCOUNTER — ANCILLARY PROCEDURE (OUTPATIENT)
Dept: MAMMOGRAPHY | Facility: CLINIC | Age: 75
End: 2025-08-06
Attending: INTERNAL MEDICINE
Payer: COMMERCIAL

## 2025-08-06 ENCOUNTER — HOSPITAL ENCOUNTER (EMERGENCY)
Facility: CLINIC | Age: 75
Discharge: HOME | End: 2025-08-06
Attending: EMERGENCY MEDICINE
Payer: COMMERCIAL

## 2025-08-06 VITALS
SYSTOLIC BLOOD PRESSURE: 164 MMHG | WEIGHT: 149 LBS | TEMPERATURE: 97.7 F | HEIGHT: 66 IN | BODY MASS INDEX: 23.95 KG/M2 | RESPIRATION RATE: 16 BRPM | DIASTOLIC BLOOD PRESSURE: 77 MMHG | OXYGEN SATURATION: 99 % | HEART RATE: 88 BPM

## 2025-08-06 VITALS
BODY MASS INDEX: 24.16 KG/M2 | HEART RATE: 82 BPM | SYSTOLIC BLOOD PRESSURE: 167 MMHG | HEIGHT: 66 IN | OXYGEN SATURATION: 100 % | WEIGHT: 150.35 LBS | DIASTOLIC BLOOD PRESSURE: 86 MMHG | RESPIRATION RATE: 18 BRPM | TEMPERATURE: 97 F

## 2025-08-06 DIAGNOSIS — Z12.31 ENCOUNTER FOR SCREENING MAMMOGRAM FOR BREAST CANCER: ICD-10-CM

## 2025-08-06 DIAGNOSIS — R00.2 PALPITATIONS: Primary | ICD-10-CM

## 2025-08-06 DIAGNOSIS — R55 VASOVAGAL SYNCOPE: Primary | ICD-10-CM

## 2025-08-06 DIAGNOSIS — R00.2 PALPITATIONS: ICD-10-CM

## 2025-08-06 DIAGNOSIS — R55 SYNCOPE, UNSPECIFIED SYNCOPE TYPE: ICD-10-CM

## 2025-08-06 DIAGNOSIS — Z12.31 VISIT FOR SCREENING MAMMOGRAM: ICD-10-CM

## 2025-08-06 LAB
ANION GAP SERPL CALCULATED.3IONS-SCNC: 14 MMOL/L (ref 7–15)
BASOPHILS # BLD AUTO: 0.1 10E3/UL (ref 0–0.2)
BASOPHILS NFR BLD AUTO: 1 %
BUN SERPL-MCNC: 9.8 MG/DL (ref 8–23)
CALCIUM SERPL-MCNC: 9.2 MG/DL (ref 8.8–10.4)
CHLORIDE SERPL-SCNC: 103 MMOL/L (ref 98–107)
CREAT SERPL-MCNC: 0.64 MG/DL (ref 0.51–0.95)
D DIMER PPP FEU-MCNC: <0.27 UG/ML FEU (ref 0–0.5)
EGFRCR SERPLBLD CKD-EPI 2021: >90 ML/MIN/1.73M2
EOSINOPHIL # BLD AUTO: 0.1 10E3/UL (ref 0–0.7)
EOSINOPHIL NFR BLD AUTO: 2 %
ERYTHROCYTE [DISTWIDTH] IN BLOOD BY AUTOMATED COUNT: 13 % (ref 10–15)
GLUCOSE SERPL-MCNC: 123 MG/DL (ref 70–99)
HCO3 SERPL-SCNC: 24 MMOL/L (ref 22–29)
HCT VFR BLD AUTO: 47.6 % (ref 35–47)
HGB BLD-MCNC: 15.7 G/DL (ref 11.7–15.7)
HOLD SPECIMEN: NORMAL
HOLD SPECIMEN: NORMAL
IMM GRANULOCYTES # BLD: 0 10E3/UL
IMM GRANULOCYTES NFR BLD: 0 %
LYMPHOCYTES # BLD AUTO: 2.1 10E3/UL (ref 0.8–5.3)
LYMPHOCYTES NFR BLD AUTO: 24 %
MAGNESIUM SERPL-MCNC: 2.2 MG/DL (ref 1.7–2.3)
MCH RBC QN AUTO: 29.1 PG (ref 26.5–33)
MCHC RBC AUTO-ENTMCNC: 33 G/DL (ref 31.5–36.5)
MCV RBC AUTO: 88 FL (ref 78–100)
MONOCYTES # BLD AUTO: 0.5 10E3/UL (ref 0–1.3)
MONOCYTES NFR BLD AUTO: 6 %
NEUTROPHILS # BLD AUTO: 5.9 10E3/UL (ref 1.6–8.3)
NEUTROPHILS NFR BLD AUTO: 68 %
NRBC # BLD AUTO: 0 10E3/UL
NRBC BLD AUTO-RTO: 0 /100
PLATELET # BLD AUTO: 254 10E3/UL (ref 150–450)
POTASSIUM SERPL-SCNC: 3.6 MMOL/L (ref 3.4–5.3)
RBC # BLD AUTO: 5.4 10E6/UL (ref 3.8–5.2)
SODIUM SERPL-SCNC: 141 MMOL/L (ref 135–145)
TROPONIN T SERPL HS-MCNC: 6 NG/L
TROPONIN T SERPL HS-MCNC: <6 NG/L
TSH SERPL DL<=0.005 MIU/L-ACNC: 2.87 UIU/ML (ref 0.3–4.2)
WBC # BLD AUTO: 8.6 10E3/UL (ref 4–11)

## 2025-08-06 PROCEDURE — 70450 CT HEAD/BRAIN W/O DYE: CPT

## 2025-08-06 PROCEDURE — 71046 X-RAY EXAM CHEST 2 VIEWS: CPT

## 2025-08-06 PROCEDURE — 93005 ELECTROCARDIOGRAM TRACING: CPT | Performed by: EMERGENCY MEDICINE

## 2025-08-06 PROCEDURE — 99214 OFFICE O/P EST MOD 30 MIN: CPT | Performed by: PHYSICIAN ASSISTANT

## 2025-08-06 PROCEDURE — 84443 ASSAY THYROID STIM HORMONE: CPT | Performed by: EMERGENCY MEDICINE

## 2025-08-06 PROCEDURE — 77067 SCR MAMMO BI INCL CAD: CPT | Mod: TC | Performed by: RADIOLOGY

## 2025-08-06 PROCEDURE — 83735 ASSAY OF MAGNESIUM: CPT | Performed by: EMERGENCY MEDICINE

## 2025-08-06 PROCEDURE — 85014 HEMATOCRIT: CPT | Performed by: EMERGENCY MEDICINE

## 2025-08-06 PROCEDURE — 93000 ELECTROCARDIOGRAM COMPLETE: CPT | Performed by: PHYSICIAN ASSISTANT

## 2025-08-06 PROCEDURE — 99285 EMERGENCY DEPT VISIT HI MDM: CPT | Mod: 25 | Performed by: EMERGENCY MEDICINE

## 2025-08-06 PROCEDURE — 80048 BASIC METABOLIC PNL TOTAL CA: CPT | Performed by: EMERGENCY MEDICINE

## 2025-08-06 PROCEDURE — 3078F DIAST BP <80 MM HG: CPT | Performed by: PHYSICIAN ASSISTANT

## 2025-08-06 PROCEDURE — 84484 ASSAY OF TROPONIN QUANT: CPT | Performed by: EMERGENCY MEDICINE

## 2025-08-06 PROCEDURE — 3077F SYST BP >= 140 MM HG: CPT | Performed by: PHYSICIAN ASSISTANT

## 2025-08-06 PROCEDURE — 77063 BREAST TOMOSYNTHESIS BI: CPT | Mod: TC | Performed by: RADIOLOGY

## 2025-08-06 PROCEDURE — 85379 FIBRIN DEGRADATION QUANT: CPT | Performed by: EMERGENCY MEDICINE

## 2025-08-06 PROCEDURE — 36415 COLL VENOUS BLD VENIPUNCTURE: CPT | Performed by: EMERGENCY MEDICINE

## 2025-08-06 ASSESSMENT — ACTIVITIES OF DAILY LIVING (ADL)
ADLS_ACUITY_SCORE: 46

## 2025-08-06 ASSESSMENT — COLUMBIA-SUICIDE SEVERITY RATING SCALE - C-SSRS
6. HAVE YOU EVER DONE ANYTHING, STARTED TO DO ANYTHING, OR PREPARED TO DO ANYTHING TO END YOUR LIFE?: NO
2. HAVE YOU ACTUALLY HAD ANY THOUGHTS OF KILLING YOURSELF IN THE PAST MONTH?: NO
1. IN THE PAST MONTH, HAVE YOU WISHED YOU WERE DEAD OR WISHED YOU COULD GO TO SLEEP AND NOT WAKE UP?: NO

## 2025-08-07 LAB
ATRIAL RATE - MUSE: 78 BPM
DIASTOLIC BLOOD PRESSURE - MUSE: NORMAL MMHG
INTERPRETATION ECG - MUSE: NORMAL
P AXIS - MUSE: 70 DEGREES
PR INTERVAL - MUSE: 132 MS
QRS DURATION - MUSE: 88 MS
QT - MUSE: 390 MS
QTC - MUSE: 444 MS
R AXIS - MUSE: 13 DEGREES
SYSTOLIC BLOOD PRESSURE - MUSE: NORMAL MMHG
T AXIS - MUSE: 68 DEGREES
VENTRICULAR RATE- MUSE: 78 BPM

## 2025-08-26 LAB — CV ZIO PRELIM RESULTS: NORMAL

## (undated) DEVICE — KIT ENDO TURNOVER/PROCEDURE W/CLEAN A SCOPE LINERS 103888

## (undated) DEVICE — ENDO SNARE EXACTO COLD 9MM LOOP 2.4MMX230CM 00711115

## (undated) DEVICE — ENDO FORCEP SPIKED SERRATED SHAFT JUMBO 239CM G56998

## (undated) DEVICE — ENDO TRAP POLYP QUICK CATCH 710201

## (undated) RX ORDER — FENTANYL CITRATE 50 UG/ML
INJECTION, SOLUTION INTRAMUSCULAR; INTRAVENOUS
Status: DISPENSED
Start: 2024-10-30